# Patient Record
Sex: MALE | Race: WHITE | NOT HISPANIC OR LATINO | Employment: OTHER | RURAL
[De-identification: names, ages, dates, MRNs, and addresses within clinical notes are randomized per-mention and may not be internally consistent; named-entity substitution may affect disease eponyms.]

---

## 2018-10-31 ENCOUNTER — TELEPHONE (OUTPATIENT)
Dept: FAMILY MEDICINE CLINIC | Facility: CLINIC | Age: 63
End: 2018-10-31

## 2018-11-05 RX ORDER — HYDROCODONE BITARTRATE AND ACETAMINOPHEN 7.5; 325 MG/1; MG/1
1 TABLET ORAL EVERY 12 HOURS PRN
Qty: 60 TABLET | Refills: 0 | Status: SHIPPED | OUTPATIENT
Start: 2018-11-05 | End: 2018-11-21 | Stop reason: SDUPTHER

## 2018-11-21 ENCOUNTER — OFFICE VISIT (OUTPATIENT)
Dept: FAMILY MEDICINE CLINIC | Facility: CLINIC | Age: 63
End: 2018-11-21

## 2018-11-21 ENCOUNTER — RESULTS ENCOUNTER (OUTPATIENT)
Dept: FAMILY MEDICINE CLINIC | Facility: CLINIC | Age: 63
End: 2018-11-21

## 2018-11-21 VITALS
HEART RATE: 58 BPM | SYSTOLIC BLOOD PRESSURE: 140 MMHG | OXYGEN SATURATION: 98 % | HEIGHT: 75 IN | WEIGHT: 212.4 LBS | TEMPERATURE: 95.3 F | DIASTOLIC BLOOD PRESSURE: 70 MMHG | BODY MASS INDEX: 26.41 KG/M2

## 2018-11-21 DIAGNOSIS — F32.89 OTHER DEPRESSION: ICD-10-CM

## 2018-11-21 DIAGNOSIS — I10 ESSENTIAL HYPERTENSION: ICD-10-CM

## 2018-11-21 DIAGNOSIS — E78.5 HYPERLIPIDEMIA LDL GOAL <100: ICD-10-CM

## 2018-11-21 DIAGNOSIS — M54.50 LUMBAR BACK PAIN: ICD-10-CM

## 2018-11-21 DIAGNOSIS — F17.210 NICOTINE DEPENDENCE, CIGARETTES, UNCOMPLICATED: ICD-10-CM

## 2018-11-21 DIAGNOSIS — Z00.00 ANNUAL PHYSICAL EXAM: Primary | ICD-10-CM

## 2018-11-21 DIAGNOSIS — Z12.5 ENCOUNTER FOR SCREENING FOR MALIGNANT NEOPLASM OF PROSTATE: ICD-10-CM

## 2018-11-21 DIAGNOSIS — Z00.00 ANNUAL PHYSICAL EXAM: ICD-10-CM

## 2018-11-21 DIAGNOSIS — R06.83 SNORING: ICD-10-CM

## 2018-11-21 DIAGNOSIS — Z12.2 ENCOUNTER FOR SCREENING FOR LUNG CANCER: ICD-10-CM

## 2018-11-21 DIAGNOSIS — K21.9 GASTROESOPHAGEAL REFLUX DISEASE WITHOUT ESOPHAGITIS: ICD-10-CM

## 2018-11-21 PROCEDURE — 99396 PREV VISIT EST AGE 40-64: CPT | Performed by: FAMILY MEDICINE

## 2018-11-21 PROCEDURE — 99214 OFFICE O/P EST MOD 30 MIN: CPT | Performed by: FAMILY MEDICINE

## 2018-11-21 RX ORDER — ASPIRIN 81 MG/1
81 TABLET, CHEWABLE ORAL DAILY
Qty: 30 TABLET | Refills: 5 | Status: SHIPPED | OUTPATIENT
Start: 2018-11-21 | End: 2019-05-13 | Stop reason: SDUPTHER

## 2018-11-21 RX ORDER — RANITIDINE 150 MG/1
150 TABLET ORAL 2 TIMES DAILY
Qty: 60 TABLET | Refills: 5 | Status: SHIPPED | OUTPATIENT
Start: 2018-11-21 | End: 2019-05-13 | Stop reason: SDUPTHER

## 2018-11-21 RX ORDER — HYDROCODONE BITARTRATE AND ACETAMINOPHEN 7.5; 325 MG/1; MG/1
1 TABLET ORAL EVERY 12 HOURS PRN
Qty: 60 TABLET | Refills: 0 | Status: SHIPPED | OUTPATIENT
Start: 2018-11-21 | End: 2020-04-24 | Stop reason: SDUPTHER

## 2018-11-21 RX ORDER — SIMVASTATIN 20 MG
1 TABLET ORAL DAILY
COMMUNITY
End: 2018-11-21 | Stop reason: SDUPTHER

## 2018-11-21 RX ORDER — RANITIDINE 150 MG/1
1 TABLET ORAL 2 TIMES DAILY
COMMUNITY
Start: 2018-10-24 | End: 2018-11-21 | Stop reason: SDUPTHER

## 2018-11-21 RX ORDER — SIMVASTATIN 20 MG
20 TABLET ORAL DAILY
Qty: 30 TABLET | Refills: 5 | Status: SHIPPED | OUTPATIENT
Start: 2018-11-21 | End: 2019-05-13 | Stop reason: SDUPTHER

## 2018-11-21 RX ORDER — CITALOPRAM 20 MG/1
20 TABLET ORAL DAILY
Qty: 30 TABLET | Refills: 5 | Status: SHIPPED | OUTPATIENT
Start: 2018-11-21 | End: 2019-05-13 | Stop reason: SDUPTHER

## 2018-11-21 RX ORDER — METOPROLOL TARTRATE 50 MG/1
1 TABLET, FILM COATED ORAL 2 TIMES DAILY
COMMUNITY
End: 2018-11-21 | Stop reason: SDUPTHER

## 2018-11-21 RX ORDER — CITALOPRAM 20 MG/1
1 TABLET ORAL DAILY
COMMUNITY
End: 2018-11-21 | Stop reason: SDUPTHER

## 2018-11-21 RX ORDER — METOPROLOL TARTRATE 50 MG/1
50 TABLET, FILM COATED ORAL 2 TIMES DAILY
Qty: 60 TABLET | Refills: 5 | Status: SHIPPED | OUTPATIENT
Start: 2018-11-21 | End: 2019-05-13 | Stop reason: SDUPTHER

## 2018-11-21 NOTE — PATIENT INSTRUCTIONS
Please review the decision aid used during our discussion regarding the Low dose lung cancer screening visit today.                          Opioid Pain Medicine Information  Opioids are powerful medicines that are used to treat moderate to severe pain. Opioids should be taken with the supervision of a trained health care provider. They should be taken for the shortest period of time as possible. This is because opioids can be addictive and the longer you take opioids, the greater your risk of addiction (opioid use disorder).  What do opioids do?  Opioids help to reduce or eliminate pain. When used for short periods of time, they can help you:  · Sleep better.  · Do better in physical or occupational therapy.  · Feel better in the first few days after an injury.  · Recover from surgery.    What is a pain treatment plan?  A pain treatment plan is an agreement between you and your health care provider. Pain is unique to each person, and treatments vary depending on your condition. To manage your pain successfully, you and your health care provider need to understand each other and work together. To help you do this:  · Discuss the goals of your treatment, including how much pain you might expect to have and how you will manage the pain.  · Review the risks and benefits of taking opioid medicines for your condition.  · Remember that a good treatment plan uses more than one approach and minimizes the chance of side effects.  · Be honest about the amount of medicines you take, and about any drug or alcohol use.  · Get pain medicine prescriptions from only one care provider.  · Keep all follow-up visits as told by your health care provider. This is important.    What instructions should I follow while taking opioid pain medicine?  While you are taking the medicine and for 8 hours after you stop taking the medicine, follow these instructions:  · Do not drive.  · Do not use machinery or power tools.  · Do not sign legal  documents.  · Do not drink alcohol.  · Do not take sleeping pills.  · Do not supervise children by yourself.  · Do not participate in activities that require climbing or being in high places.  · Do not enter a body of water--such as a lake, river, ocean, spa, or swimming pool--unless an adult is nearby who can monitor and help you.    What kinds of side effects can opioids cause?  Opioids can cause side effects, such as:  · Constipation.  · Nausea.  · Vomiting.  · Drowsiness.  · Confusion.  · Opioid use disorder.  · Breathing difficulties (respiratory depression).    Using opioid pain medicines for longer than 3 days increases your risk of these side effects.  Taking opioid pain medicine for a long period of time can affect your ability to do daily tasks. It also puts you at risk for:  · Motor vehicle accidents.  · Depression.  · Suicide.  · Heart attack.  · Overdose, which can sometimes lead to death.    What are alternative ways to manage pain?  Pain can be managed with many types of alternative treatments. Ask your health care provider to refer you to one or more specialists who can help you manage pain through:  · Physical or occupational therapy.  · Counseling (cognitive behavioral therapy).  · Good nutrition.  · Biofeedback.  · Massage.  · Meditation.  · Non-opioid medicine.  · Following a gentle exercise program.    How can I keep others safe while I am taking opioid pain medicine?  · Keep pain medicine in a locked cabinet, or in a secure area where children cannot reach it.  · Never share your pain medicine with anyone.  · Do not save any leftover pills. If you have leftover medicine, you can:  1. Bring the medicine to a prescription take-back program. This is usually offered by the county or law enforcement.  2. Throw it out in the trash. To do this:  § Mix the medicine with undesirable trash such as pet waste or food.  § Put the mixture in a sealed container or plastic bag.  § Throw it in the  trash.  § Destroy any personal information on the prescription bottle.  How do I stop taking opioids if I have been taking them for a long time?  If you have been taking opioid medicine for more than a few weeks, you may need to slowly decrease (taper) how much you take until you stop completely. Tapering your use of opioids can decrease your chances of experiencing withdrawal symptoms, such as:  · Pain and cramping in the abdomen.  · Nausea.  · Sweating.  · Sleepiness.  · Restlessness.  · Uncontrollable shaking (tremors).  · Cravings for the medicine.    Do not attempt to taper your use of opioids on your own. Talk with your health care provider about how to do this. Your health care provider may prescribe a step-down schedule based on how much medicine you are taking and how long you have been taking it.  Where to find support:  If you have been taking opioids for a long time, you may benefit from receiving support for quitting from a local support group or counselor. Ask your health care provider for a referral to these resources in your area.  Where to find more information:  · Centers for Disease Control and Prevention (CDC): www.cdc.gov/drugoverdose/opioids/index.html  Get help right away if:  Seek medical care right away if you are taking opioids and you (or people close to you) notice any of the following:  · Difficulty breathing.  · Breathing that is slower or more shallow than normal.  · A very slow heartbeat (pulse).  · Severe confusion.  · Unconsciousness.  · Sleepiness.  · Slurred speech.  · Nausea and vomiting.  · Cold, clammy skin.  · Blue lips or fingernails.  · Limpness.  · Abnormally small pupils.    If you think that you or someone else may have taken too much of an opioid medicine, get medical help right away. Do not wait to see if the symptoms go away on their own.   If you ever feel like you may hurt yourself or others, or have thoughts about taking your own life, get help right away. You can  go to your nearest emergency department or call:  · Your local emergency services (911 in the [place]  · The hotline of the National Poison Control Center (1-318.622.6881 in the [place]  · A suicide crisis helpline, such as the National Suicide Prevention Lifeline at 1-745.380.8039. This is open 24 hours a day.    Summary  · Opioid medicines can help you manage moderate to severe pain for a short period of time.  · Discuss the goals of your treatment with your health care provider, including how much pain you might expect to have and how you will manage the pain.  · A good treatment plan uses more than one approach. Pain can be managed with many types of alternative treatments.  · If you think that you or someone else may have taken too much of an opioid, get medical help right away.  This information is not intended to replace advice given to you by your health care provider. Make sure you discuss any questions you have with your health care provider.  Document Released: 01/13/2017 Document Revised: 04/06/2018 Document Reviewed: 07/29/2016  Elsevier Interactive Patient Education © 2018 [employer]

## 2018-11-21 NOTE — PROGRESS NOTES
"   Low-Dose Lung Cancer CT Screening Visit    CHIEF COMPLAINT:    Shared Decision Making  I am discussing tobacco cessation today with Rupa Verma    SMOKING HISTORY:     Social History     Tobacco Use   Smoking Status Current Every Day Smoker   • Packs/day: 1.00   • Years: 50.00   • Pack years: 50.00   • Types: Cigarettes   Smokeless Tobacco Never Used       SUBJECTIVE:     Rupa Verma is currently smoking 1 pack(s) per day with a  40+ pack year history.  Reports no use of alternate forms of tobacco, electronic cigarettes, marijuana or other substances.  Based on the recommendation of the [place] Preventive Services Task Force, this patient is at high risk for lung cancer and a low-dose CT screening scan is recommended.     The patient has had no hemoptysis, unintentional weight loss or increasing shortness of breath. The patient is asymptomatic and has no signs or symptoms of lung cancer.     Together we discussed the potential benefits and potential harms of being screened for lung cancer including the potential for follow up diagnostic testing, risk for over diagnosis, false positive rate and radiation exposure using the AHRQ: Is Lung Cancer Screening Right for Me Decision Aid (Publication [id number], web site www.ahrq.gov). A copy of this decision aid resource has been provided in the after visit summary.  We also reviewed the patient's smoking history and counseled him on the importance and health benefits of stopping the use of tobacco products.      OBJECTIVE:    /70 (BP Location: Left arm, Patient Position: Sitting, Cuff Size: Adult)   Pulse 58   Temp 95.3 °F (35.2 °C)   Ht 190.5 cm (75\")   Wt 96.3 kg (212 lb 6.4 oz)   SpO2 98%   BMI 26.55 kg/m²   General: no distress, alert and oriented  Chest: Lung sounds are clear to auscultation, no wheezes, rales or rhonchi, with symmetric air entry. No respiratory distress  Cardiovascular: RRR with no murmur auscultated      Smoking Cessation " discussion:     We discussed that there are a number of resources and interventions to assist with smoking cessation if needed in the future including the 1-800-Quit Now line.(Included in the decision aid shared with the patient today).   On a scale of zero to ten, the patient rates their motivation to quit at a 2 out of 10 today.  Referral to stop smoking class has been offered. Medication options for tobacco cessation have been discussed with the patient.     Recommendations for continued lung cancer screening:      We discussed the NCCN guidelines for lung cancer screening and the patient verbalized understanding that annual screening is recommended until fifteen years beyond smoking as long as they have no other disease or comorbidity that would prevent them from receiving cancer treatments such as surgery should a lung cancer be detected.  After review of the NCCN guidelines and recommendations for ongoing screening, the patient verbalized understanding of recommendations for follow-up.  The patient has decided to proceed with a Low Dose Lung Cancer Screening CT today.      10 minutes face-to-face spent counseling patient on the continued health benefits of having quit tobacco, maintaining smoking abstinence, smoking cessation strategies and resources, as well as the importance of adherence to annual lung cancer low-dose CT screening.

## 2018-11-22 NOTE — PROGRESS NOTES
OFFICE VISIT NOTE:    Rupa Verma is a 63 y.o. male who presents today for Annual Exam (pt is here for physical).     Back Pain   This is a chronic problem. The current episode started more than 1 year ago. The problem occurs daily. The problem has been waxing and waning since onset. The pain is present in the lumbar spine. The quality of the pain is described as aching and cramping. The pain does not radiate. The pain is at a severity of 8/10. The pain is the same all the time. The symptoms are aggravated by bending, twisting, standing and position. Stiffness is present all day. Associated symptoms include weakness. Pertinent negatives include no bladder incontinence, bowel incontinence, chest pain, fever or tingling. Risk factors include sedentary lifestyle. He has tried analgesics for the symptoms. The treatment provided mild relief.      Also, wanted an annual exam but declined the /rectal exam. He reports excess snoring according to his wife. And has a chronic problem with his lower back for years. Does take chronic Norco, but minimal dose, and he will be referred to pain management given its chronicity.    Past medical/surgical history, Family history, Social history, Allergies and Medications have been reviewed with the patient today and are updated in [facility] EMR. See below.    Past Medical History:   Diagnosis Date   • Hyperlipidemia    • Hypertension      Past Surgical History:   Procedure Laterality Date   • CHOLECYSTECTOMY     • COLONOSCOPY     • ENDOSCOPY     • PATELLA FRACTURE SURGERY     • TONSILLECTOMY       History reviewed. No pertinent family history.  Social History     Tobacco Use   • Smoking status: Current Every Day Smoker     Packs/day: 1.00     Years: 50.00     Pack years: 50.00     Types: Cigarettes   • Smokeless tobacco: Never Used   Substance Use Topics   • Alcohol use: Yes     Comment: occ   • Drug use: No       Allergies:  Patient has no known allergies.      Review of  "Systems:    Review of Systems   Constitutional: Negative for activity change, appetite change, fatigue and fever.   Respiratory: Negative for cough and shortness of breath.    Cardiovascular: Negative for chest pain.   Gastrointestinal: Negative for bowel incontinence.   Genitourinary: Negative for urinary incontinence.   Musculoskeletal: Positive for back pain and myalgias.   Skin: Negative for rash.   Neurological: Positive for weakness. Negative for tingling, syncope and headache.         Physical Examination:  Vital Signs:  /70 (BP Location: Left arm, Patient Position: Sitting, Cuff Size: Adult)   Pulse 58   Temp 95.3 °F (35.2 °C)   Ht 190.5 cm (75\")   Wt 96.3 kg (212 lb 6.4 oz)   SpO2 98%   BMI 26.55 kg/m²   Physical Exam   Constitutional: He is oriented to person, place, and time. He appears well-developed and well-nourished. No distress.   HENT:   Head: Normocephalic and atraumatic.   Nose: Nose normal.   Mouth/Throat: Oropharynx is clear and moist.   Eyes: Conjunctivae are normal.   Neck: Normal range of motion. Neck supple. No JVD present.   Cardiovascular: Normal rate, regular rhythm and normal heart sounds.   Pulmonary/Chest: Effort normal. No respiratory distress. He has wheezes.   Abdominal: Soft. He exhibits no distension. There is no tenderness.   Musculoskeletal: Normal range of motion. He exhibits no edema.   Neurological: He is alert and oriented to person, place, and time.   Skin: Skin is warm and dry. Capillary refill takes less than 2 seconds. No pallor.   Psychiatric: He has a normal mood and affect. His behavior is normal.   Nursing note and vitals reviewed.      Procedures      ASSESSMENT/ PLAN:    Rupa was seen today for annual exam.    Diagnoses and all orders for this visit:    Annual physical exam  -     CBC (No Diff); Future  -     PSA Screen; Future    Encounter for screening for lung cancer  -     CT chest low dose wo; Future    Nicotine dependence, cigarettes, " uncomplicated  -     CT chest low dose wo; Future    Snoring  -     Ambulatory Referral to Sleep Medicine    Essential hypertension  -     CBC (No Diff); Future  -     Comprehensive Metabolic Panel; Future  -     T4; Future  -     TSH; Future  -     metoprolol tartrate (LOPRESSOR) 50 MG tablet; Take 1 tablet by mouth 2 (Two) Times a Day.  -     aspirin (ASPIRIN 81) 81 MG chewable tablet; Chew 1 tablet Daily.    Hyperlipidemia LDL goal <100  -     Lipid Panel; Future  -     simvastatin (ZOCOR) 20 MG tablet; Take 1 tablet by mouth Daily.    Encounter for screening for malignant neoplasm of prostate  -     PSA Screen; Future    Gastroesophageal reflux disease without esophagitis  -     raNITIdine (ZANTAC) 150 MG tablet; Take 1 tablet by mouth 2 (Two) Times a Day.    Lumbar back pain  -     HYDROcodone-acetaminophen (NORCO) 7.5-325 MG per tablet; Take 1 tablet by mouth Every 12 (Twelve) Hours As Needed for Moderate Pain  or Severe Pain .  -     Ambulatory Referral to Pain Management  -     Urine Drug Screen - Urine, Clean Catch; Future    Other depression  -     citalopram (CeleXA) 20 MG tablet; Take 1 tablet by mouth Daily.          Current Outpatient Medications:   •  citalopram (CeleXA) 20 MG tablet, Take 1 tablet by mouth Daily., Disp: 30 tablet, Rfl: 5  •  HYDROcodone-acetaminophen (NORCO) 7.5-325 MG per tablet, Take 1 tablet by mouth Every 12 (Twelve) Hours As Needed for Moderate Pain  or Severe Pain ., Disp: 60 tablet, Rfl: 0  •  metoprolol tartrate (LOPRESSOR) 50 MG tablet, Take 1 tablet by mouth 2 (Two) Times a Day., Disp: 60 tablet, Rfl: 5  •  raNITIdine (ZANTAC) 150 MG tablet, Take 1 tablet by mouth 2 (Two) Times a Day., Disp: 60 tablet, Rfl: 5  •  simvastatin (ZOCOR) 20 MG tablet, Take 1 tablet by mouth Daily., Disp: 30 tablet, Rfl: 5  •  aspirin (ASPIRIN 81) 81 MG chewable tablet, Chew 1 tablet Daily., Disp: 30 tablet, Rfl: 5    FOLLOW-UP:    Return in 3 months (on 2/21/2019) for Recheck.    I discussed the  patients findings and my recommendations with patient.  An After Visit Summary (AVS) was printed and given to the patient at discharge.    Jose Leyva MD, FAAFP  11/21/2018

## 2018-12-04 ENCOUNTER — HOSPITAL ENCOUNTER (OUTPATIENT)
Dept: CT IMAGING | Facility: HOSPITAL | Age: 63
Discharge: HOME | End: 2018-12-04
Attending: FAMILY MEDICINE | Admitting: FAMILY MEDICINE

## 2018-12-04 DIAGNOSIS — F17.210 NICOTINE DEPENDENCE, CIGARETTES, UNCOMPLICATED: ICD-10-CM

## 2018-12-04 DIAGNOSIS — Z12.2 ENCOUNTER FOR SCREENING FOR LUNG CANCER: ICD-10-CM

## 2018-12-04 PROCEDURE — G0297 LDCT FOR LUNG CA SCREEN: HCPCS

## 2018-12-05 ENCOUNTER — TELEPHONE (OUTPATIENT)
Dept: FAMILY MEDICINE CLINIC | Facility: CLINIC | Age: 63
End: 2018-12-05

## 2018-12-14 ENCOUNTER — TELEPHONE (OUTPATIENT)
Dept: FAMILY MEDICINE CLINIC | Facility: CLINIC | Age: 63
End: 2018-12-14

## 2018-12-14 NOTE — TELEPHONE ENCOUNTER
Joyce stated we need the form order written out for Pt's sleep lab please.  Please fax to her at [phone].    Thanks.

## 2019-04-19 ENCOUNTER — DOCUMENTATION (OUTPATIENT)
Dept: CT IMAGING | Facility: HOSPITAL | Age: 64
End: 2019-04-19

## 2019-04-22 DIAGNOSIS — R91.8 MULTIPLE LUNG NODULES ON CT: Primary | ICD-10-CM

## 2019-05-03 DIAGNOSIS — R91.8 MULTIPLE LUNG NODULES ON CT: Primary | ICD-10-CM

## 2019-05-13 ENCOUNTER — OFFICE VISIT (OUTPATIENT)
Dept: FAMILY MEDICINE CLINIC | Facility: CLINIC | Age: 64
End: 2019-05-13

## 2019-05-13 VITALS
TEMPERATURE: 97 F | HEIGHT: 75 IN | WEIGHT: 214.8 LBS | OXYGEN SATURATION: 95 % | BODY MASS INDEX: 26.71 KG/M2 | HEART RATE: 55 BPM | DIASTOLIC BLOOD PRESSURE: 68 MMHG | SYSTOLIC BLOOD PRESSURE: 158 MMHG

## 2019-05-13 DIAGNOSIS — F32.89 OTHER DEPRESSION: ICD-10-CM

## 2019-05-13 DIAGNOSIS — E78.5 HYPERLIPIDEMIA LDL GOAL <100: ICD-10-CM

## 2019-05-13 DIAGNOSIS — I10 ESSENTIAL HYPERTENSION: ICD-10-CM

## 2019-05-13 DIAGNOSIS — K21.9 GASTROESOPHAGEAL REFLUX DISEASE WITHOUT ESOPHAGITIS: ICD-10-CM

## 2019-05-13 DIAGNOSIS — R91.8 MULTIPLE LUNG NODULES ON CT: Primary | ICD-10-CM

## 2019-05-13 DIAGNOSIS — R91.1 LUNG NODULE: ICD-10-CM

## 2019-05-13 DIAGNOSIS — I10 ESSENTIAL HYPERTENSION: Primary | ICD-10-CM

## 2019-05-13 PROCEDURE — 99213 OFFICE O/P EST LOW 20 MIN: CPT | Performed by: FAMILY MEDICINE

## 2019-05-13 NOTE — PATIENT INSTRUCTIONS
Suspect Essential HTN.Good BP control is encouraged with Goal BP based on JNC 8 guidelines 2014 <140/90 for patients with known cardiac disease and diabetes. (MARINA. 2014:322 (5):507-520. doi:10.1001/marina.2013.21168): general population <60 yr old goal BP <140/90 and for those >60 <150/90.  For patients of all ages with Diabetes, CKD, Known CAD <140/90. Recommended to the patient to obtain electronic home BP machine with upper arm blood pressure cuff and to check regularly as instructed.  Keep BP log and bring to subsequent visits. Stable, at goal.  a. LABS: routine for hypertension recommended and ordered if necessary.  b. Recommend if you do not have a home BP machine to obtain an electronic machine with arm blood pressure cuff.      c. Monitor BP over the next week and keep log to bring back to office. Discussed medication therapy however pt wants to try to control with diet exercise. .  Your provider  has recommended self-monitoring of your blood pressure.  If you do not have a blood pressure cuff you may purchase one from the local pharmacy.  You may ask the pharmacist which brand and model they recommend.  Obtain your blood pressure measurement at least 2x per week.  You should also check your blood pressure if you experience any symptoms of blurred visit, dizziness or headache.  Please record all blood pressure measurements and bring them to next office visit.  If you have any questions about the accuracy of your blood pressure machine please bring it in to the office and our staff will be happy to check accuracy.   d. Encouraged to eat a low sodium heart healthy diet  e. Offered handout on HTN educational topics.  These were provided if patient requested these today.  f. MEDS: as listed in today's visit.  g. Risks/benefits of current and new medications discussed with the patient and or family today.  The patient/family are aware and accept that if there any side effects they should call or return to clinic  as soon as possible.  Appropriate F/U discussed for topics addressed today. All questions were answered to the  satisfactory state of patient/family.  Should symptoms fail to improve or worsen they agree to call or return to clinic or to go to the ER. Education handouts were offered on any new Rx if requested.  Discussed the importance of following up with any needed screening tests/labs/specialist appointments and any requested follow-up recommended by me today.  Importance of maintaining follow-up discussed and patient accepts that missed appointments can delay diagnosis and potentially lead to worsening of conditions.      Chronic Obstructive Pulmonary Disease  Chronic obstructive pulmonary disease (COPD) is a long-term (chronic) condition that affects the lungs. COPD is a general term that can be used to describe many different lung problems that cause lung swelling (inflammation) and limit airflow, including chronic bronchitis and emphysema. If you have COPD, your lung function will probably never return to normal. In most cases, it gets worse over time. However, there are steps you can take to slow the progression of the disease and improve your quality of life.  What are the causes?  This condition may be caused by:  · Smoking. This is the most common cause.  · Certain genes passed down through families.    What increases the risk?  The following factors may make you more likely to develop this condition:  · Secondhand smoke from cigarettes, pipes, or cigars.  · Exposure to chemicals and other irritants such as fumes and dust in the work environment.  · Chronic lung conditions or infections.    What are the signs or symptoms?  Symptoms of this condition include:  · Shortness of breath, especially during physical activity.  · Chronic cough with a large amount of thick mucus. Sometimes the cough may not have any mucus (dry cough).  · Wheezing.  · Rapid breaths.  · Gray or bluish discoloration (cyanosis) of the  skin, especially in your fingers, toes, or lips.  · Feeling tired (fatigue).  · Weight loss.  · Chest tightness.  · Frequent infections.  · Episodes when breathing symptoms become much worse (exacerbations).  · Swelling in the ankles, feet, or legs. This may occur in later stages of the disease.    How is this diagnosed?  This condition is diagnosed based on:  · Your medical history.  · A physical exam.    You may also have tests, including:  · Lung (pulmonary) function tests. This may include a spirometry test, which measures your ability to exhale properly.  · Chest X-ray.  · CT scan.  · Blood tests.    How is this treated?  This condition may be treated with:  · Medicines. These may include inhaled rescue medicines to treat acute exacerbations as well as long-term, or maintenance, medicines to prevent flare-ups of COPD.  ? Bronchodilators help treat COPD by dilating the airways to allow increased airflow and make your breathing more comfortable.  ? Steroids can reduce airway inflammation and help prevent exacerbations.  · Smoking cessation. If you smoke, your health care provider may ask you to quit, and may also recommend therapy or replacement products to help you quit.  · Pulmonary rehabilitation. This may involve working with a team of health care providers and specialists, such as respiratory, occupational, and physical therapists.  · Exercise and physical activity. These are beneficial for nearly all people with COPD.  · Nutrition therapy to gain weight, if you are underweight.  · Oxygen. Supplemental oxygen therapy is only helpful if you have a low oxygen level in your blood (hypoxemia).  · Lung surgery or transplant.  · Palliative care. This is to help people with COPD feel comfortable when treatment is no longer working.    Follow these instructions at home:  Medicines  · Take over-the-counter and prescription medicines (inhaled or pills) only as told by your health care provider.  · Talk to your health  care provider before taking any cough or allergy medicines. You may need to avoid certain medicines that dry out your airways.  Lifestyle  · If you are a smoker, the most important thing that you can do is to stop smoking. Do not use any products that contain nicotine or tobacco, such as cigarettes and e-cigarettes. If you need help quitting, ask your health care provider. Continuing to smoke will cause the disease to progress faster.  · Avoid exposure to things that irritate your lungs, such as smoke, chemicals, and fumes.  · Stay active, but balance activity with periods of rest. Exercise and physical activity will help you maintain your ability to do things you want to do.  · Learn and use relaxation techniques to manage stress and to control your breathing.  · Get the right amount of sleep and get quality sleep. Most adults need 7 or more hours per night.  · Eat healthy foods. Eating smaller, more frequent meals and resting before meals may help you maintain your strength.  Controlled breathing  Learn and use controlled breathing techniques as directed by your health care provider. Controlled breathing techniques include:  · Pursed lip breathing. Start by breathing in (inhaling) through your nose for 1 second. Then, purse your lips as if you were going to whistle and breathe out (exhale) through the pursed lips for 2 seconds.  · Diaphragmatic breathing. Start by putting one hand on your abdomen just above your waist. Inhale slowly through your nose. The hand on your abdomen should move out. Then purse your lips and exhale slowly. You should be able to feel the hand on your abdomen moving in as you exhale.    Controlled coughing  Learn and use controlled coughing to clear mucus from your lungs. Controlled coughing is a series of short, progressive coughs. The steps of controlled coughing are:  1. Lean your head slightly forward.  2. Breathe in deeply using diaphragmatic breathing.  3. Try to hold your breath for  3 seconds.  4. Keep your mouth slightly open while coughing twice.  5. Spit any mucus out into a tissue.  6. Rest and repeat the steps once or twice as needed.    General instructions  · Make sure you receive all the vaccines that your health care provider recommends, especially the pneumococcal and influenza vaccines. Preventing infection and hospitalization is very important when you have COPD.  · Use oxygen therapy and pulmonary rehabilitation if directed to by your health care provider. If you require home oxygen therapy, ask your health care provider whether you should purchase a pulse oximeter to measure your oxygen level at home.  · Work with your health care provider to develop a COPD action plan. This will help you know what steps to take if your condition gets worse.  · Keep other chronic health conditions under control as told by your health care provider.  · Avoid extreme temperature and humidity changes.  · Avoid contact with people who have an illness that spreads from person to person (is contagious), such as viral infections or pneumonia.  · Keep all follow-up visits as told by your health care provider. This is important.  Contact a health care provider if:  · You are coughing up more mucus than usual.  · There is a change in the color or thickness of your mucus.  · Your breathing is more labored than usual.  · Your breathing is faster than usual.  · You have difficulty sleeping.  · You need to use your rescue medicines or inhalers more often than expected.  · You have trouble doing routine activities such as getting dressed or walking around the house.  Get help right away if:  · You have shortness of breath while you are resting.  · You have shortness of breath that prevents you from:  ? Being able to talk.  ? Performing your usual physical activities.  · You have chest pain lasting longer than 5 minutes.  · Your skin color is more blue (cyanotic) than usual.  · You measure low oxygen saturations  for longer than 5 minutes with a pulse oximeter.  · You have a fever.  · You feel too tired to breathe normally.  Summary  · Chronic obstructive pulmonary disease (COPD) is a long-term (chronic) condition that affects the lungs.  · Your lung function will probably never return to normal. In most cases, it gets worse over time. However, there are steps you can take to slow the progression of the disease and improve your quality of life.  · Treatment for COPD may include taking medicines, quitting smoking, pulmonary rehabilitation, and changes to diet and exercise. As the disease progresses, you may need oxygen therapy, a lung transplant, or palliative care.  · To help manage your condition, do not smoke, avoid exposure to things that irritate your lungs, stay up to date on all vaccines, and follow your health care provider's instructions for taking medicines.  This information is not intended to replace advice given to you by your health care provider. Make sure you discuss any questions you have with your health care provider.  Document Released: 09/27/2006 Document Revised: 06/13/2018 Document Reviewed: 01/22/2018  [employer] Interactive Patient Education © 2019 [employer]

## 2019-05-13 NOTE — PROGRESS NOTES
OFFICE VISIT NOTE:    Rupa Verma is a 64 y.o. male who presents today for Hypertension (f/u) and discuss CT results.     He had a CT of chest at end of November or December last year, with spiculated nodule, and we've been trying to arrange a follow-up PET scan as per pulmonary Navigator suggestions, but insurance has denied this test a few times for uncertain reason. We have recommended repeat CT chest to see if any interim change, and will recheck this as well as considering pulmonology referral for opinion also. Denies any blood in cough. No chest pain. Still smoking.          Past medical/surgical history, Family history, Social history, Allergies and Medications have been reviewed with the patient today and are updated in [employer] EMR. See below.    Past Medical History:   Diagnosis Date   • Back pain    • Hyperlipidemia    • Hypertension      Past Surgical History:   Procedure Laterality Date   • CHOLECYSTECTOMY     • COLONOSCOPY     • ENDOSCOPY     • PATELLA FRACTURE SURGERY     • TONSILLECTOMY       Family History   Problem Relation Age of Onset   • Hypertension Mother    • No Known Problems Father    • Diabetes Neg Hx    • Cancer Neg Hx    • Stroke Neg Hx      Social History     Tobacco Use   • Smoking status: Current Every Day Smoker     Packs/day: 1.00     Years: 50.00     Pack years: 50.00     Types: Cigarettes   • Smokeless tobacco: Never Used   Substance Use Topics   • Alcohol use: Yes     Comment: occ   • Drug use: No       Allergies:  Patient has no known allergies.    Current Meds:    Current Outpatient Medications:   •  HYDROcodone-acetaminophen (NORCO) 7.5-325 MG per tablet, Take 1 tablet by mouth Every 12 (Twelve) Hours As Needed for Moderate Pain  or Severe Pain ., Disp: 60 tablet, Rfl: 0  •  citalopram (CeleXA) 20 MG tablet, TAKE ONE TABLET BY MOUTH EVERY DAY, Disp: 90 tablet, Rfl: 1  •  HM ASPIRIN 81 MG chewable tablet, CHEW AND SWALLOW ONE TABLET BY MOUTH EVERY DAY, Disp: 90 tablet, Rfl: 1  •  " metoprolol tartrate (LOPRESSOR) 50 MG tablet, TAKE ONE TABLET BY MOUTH TWICE A DAY, Disp: 180 tablet, Rfl: 1  •  raNITIdine (ZANTAC) 150 MG tablet, TAKE ONE TABLET BY MOUTH TWICE A DAY, Disp: 120 tablet, Rfl: 1  •  simvastatin (ZOCOR) 20 MG tablet, TAKE ONE TABLET BY MOUTH EVERY DAY, Disp: 90 tablet, Rfl: 1    Review of Systems:  Review of Systems   Constitutional: Negative for activity change, appetite change, fatigue, fever, unexpected weight gain and unexpected weight loss.   Respiratory: Negative for shortness of breath.    Cardiovascular: Negative for chest pain.   Gastrointestinal: Negative for abdominal pain.   Genitourinary: Negative for difficulty urinating.   Skin: Negative for rash.   Neurological: Negative for syncope and headache.       Physical Examination:  Vital Signs:  /68 (BP Location: Left arm, Patient Position: Sitting, Cuff Size: Adult)   Pulse 55   Temp 97 °F (36.1 °C) (Tympanic)   Ht 190.5 cm (75\")   Wt 97.4 kg (214 lb 12.8 oz)   SpO2 95%   BMI 26.85 kg/m²   Physical Exam   Constitutional: He is oriented to person, place, and time. He appears well-developed and well-nourished. No distress.   HENT:   Head: Normocephalic and atraumatic.   Mouth/Throat: Oropharynx is clear and moist.   Neck: Normal range of motion. Neck supple. No JVD present.   Cardiovascular: Normal rate, regular rhythm, normal heart sounds and intact distal pulses.   Pulmonary/Chest: Effort normal. No respiratory distress. He has wheezes. He has rales (chronic).   Abdominal: Soft. He exhibits no distension. There is no tenderness.   Musculoskeletal: Normal range of motion. He exhibits no edema.   Neurological: He is alert and oriented to person, place, and time. No cranial nerve deficit.   Skin: Skin is warm and dry. Capillary refill takes less than 2 seconds. No rash noted.   Psychiatric: He has a normal mood and affect. His behavior is normal.   Nursing note and vitals reviewed.      Procedures    ASSESSMENT/ " PLAN:        Problem List Items Addressed This Visit        Cardiovascular and Mediastinum    Essential hypertension - Primary      Other Visit Diagnoses     Lung nodule                       Specific Patient Instructions:  MEDICATION Instructions: Encouraged patient to continue routine medicines as prescribed and maintain compliance. Patient instructed to report any adverse side effects or reactions to medicines promptly to the office. Patient instructed to make us aware of any OTC or herbal meds or supplement use.  DIET Recommendations: No new recommendations regarding diet/restrictions.  EXERCISE Instructions: No new recommendations.    SMOKING Recommendations: Counseled patient and encouraged them on smoking cessation.  HEALTH MAINTENANCE:  N/A  MISCELLANEOUS Instructions: N/A      Medications ordered or changed this visit:  No orders of the defined types were placed in this encounter.       FOLLOW-UP:  Return in about 6 months (around 11/13/2019) for Recheck.    I discussed the patients findings and my recommendations with patient.  An After Visit Summary (AVS) was printed and given to the patient at discharge.      Jose Leyva MD, FAAFP  5/15/2019

## 2019-05-15 RX ORDER — CITALOPRAM 20 MG/1
TABLET ORAL
Qty: 90 TABLET | Refills: 1 | Status: SHIPPED | OUTPATIENT
Start: 2019-05-15 | End: 2019-11-12 | Stop reason: SDUPTHER

## 2019-05-15 RX ORDER — ASPIRIN 81 MG/1
TABLET, CHEWABLE ORAL
Qty: 90 TABLET | Refills: 1 | Status: SHIPPED | OUTPATIENT
Start: 2019-05-15 | End: 2020-12-16 | Stop reason: SDUPTHER

## 2019-05-15 RX ORDER — METOPROLOL TARTRATE 50 MG/1
TABLET, FILM COATED ORAL
Qty: 180 TABLET | Refills: 1 | Status: SHIPPED | OUTPATIENT
Start: 2019-05-15 | End: 2019-11-12 | Stop reason: SDUPTHER

## 2019-05-15 RX ORDER — RANITIDINE 150 MG/1
TABLET ORAL
Qty: 120 TABLET | Refills: 1 | Status: SHIPPED | OUTPATIENT
Start: 2019-05-15 | End: 2019-09-10 | Stop reason: SDUPTHER

## 2019-05-15 RX ORDER — SIMVASTATIN 20 MG
TABLET ORAL
Qty: 90 TABLET | Refills: 1 | Status: SHIPPED | OUTPATIENT
Start: 2019-05-15 | End: 2019-11-12 | Stop reason: SDUPTHER

## 2019-06-07 ENCOUNTER — TELEPHONE (OUTPATIENT)
Dept: FAMILY MEDICINE CLINIC | Facility: CLINIC | Age: 64
End: 2019-06-07

## 2019-06-07 DIAGNOSIS — I10 ESSENTIAL HYPERTENSION: Primary | ICD-10-CM

## 2019-06-07 RX ORDER — AMLODIPINE BESYLATE 5 MG/1
5 TABLET ORAL DAILY
Qty: 30 TABLET | Refills: 2 | Status: SHIPPED | OUTPATIENT
Start: 2019-06-07 | End: 2019-08-13 | Stop reason: SDUPTHER

## 2019-06-07 NOTE — TELEPHONE ENCOUNTER
Pt called regarding blood pressure medication. He stated that at last appt, Dr. Leyva had advised that he was going to raise dosage. Per Pt at this time, dosage has not been raised. Pt stated that as of today his systolic reading was 176 and he is concerned. Please call regarding this.

## 2019-08-01 ENCOUNTER — TELEPHONE (OUTPATIENT)
Dept: FAMILY MEDICINE CLINIC | Facility: CLINIC | Age: 64
End: 2019-08-01

## 2019-08-01 NOTE — TELEPHONE ENCOUNTER
----- Message from Shantelle Ponce sent at 8/1/2019  9:04 AM CDT -----  Regarding: FW: Follow-up  Please call and schedule this patient a follow up appointment with Dr. Leyva.     ----- Message -----  From: Jose Leyva MD  Sent: 7/31/2019   9:54 AM  To: Shantelle JERSON Kris  Subject: RE: Follow-up                                    Yes please - he needs to understand the need for follow up appts.  ----- Message -----  From: Abner Ponceanat ARTHUR  Sent: 7/25/2019   8:58 AM  To: Jose Leyva MD  Subject: FW: Follow-up                                    This patient no showed for his pulmonary consult. Would you like this patient to follow up with you or would you like us to set him up a new appt in pulmonology?    Thank you!    ----- Message -----  From: Martha Cedillo  Sent: 7/24/2019   1:20 PM  To: Shantelle Ponce  Subject: RE: Follow-up                                    You may already know this, just thought I would mention. I was doing follow-ups and noticed this patient was a no show for his pulmonary consult.  Thanks,   ----- Message -----  From: KrisWilliamShantelle JERSON  Sent: 5/2/2019  10:27 AM  To: Martha Cedillo  Subject: RE: Follow-up                                    The ICD-10 used was R91.8    ----- Message -----  From: Martha Cedillo  Sent: 5/2/2019   9:50 AM  To: Shantelle Ponce  Subject: RE: Follow-up                                    That is insane. What ICD-10 code where you using?  I wonder if they would approve a CT chest with contrast.  I really feel this patient needs a follow-up.      ----- Message -----  From: Shantelle Ponce  Sent: 5/2/2019   8:14 AM  To: Martha Cedillo  Subject: RE: Follow-up                                    Hi Martha,  We tried to get authorization for this patient to have a Pet Scan through their insurance and it has been denied.     ----- Message -----  From: Martha Cedillo  Sent: 4/22/2019  10:25 AM  To: Shantelle Ponce  Subject: RE: Follow-up                                 "    The Radiologist had recommended a \"PET CT\" (which is a Pet Scan) or tissue sampling.   If he is wanting to go with Imaging, then a Pet scan is what he would need. Thanks,    ----- Message -----  From: Shantelle Ponce  Sent: 4/22/2019   9:48 AM  To: Martha Cedillo  Subject: RE: Follow-up                                    Martha,  I have talked with Dr. Leyva regarding this patient and he wanted your opinion on which you think would be best for the patient, a CT or a PET scan?    Thank you!  ----- Message -----  From: Jose Leyva MD  Sent: 4/19/2019   5:33 PM  To: Shantelle Mcclellan  Subject: RE: Follow-up                                    Yes, OK to pend the order for me, please!  ----- Message -----  From: Martha Cedillo  Sent: 4/19/2019  12:56 PM  To: Jose Leyva MD  Subject: Follow-up                                        Hi, I'm the Lung Navigator for [facility].  Just a friendly reminder that this patient was due a follow-up CT or PET Scan.  I did not see any of this in his chart                      "

## 2019-08-13 DIAGNOSIS — I10 ESSENTIAL HYPERTENSION: ICD-10-CM

## 2019-08-14 RX ORDER — AMLODIPINE BESYLATE 5 MG/1
TABLET ORAL
Qty: 90 TABLET | Refills: 0 | Status: SHIPPED | OUTPATIENT
Start: 2019-08-14 | End: 2019-08-16 | Stop reason: SDUPTHER

## 2019-08-16 ENCOUNTER — OFFICE VISIT (OUTPATIENT)
Dept: FAMILY MEDICINE CLINIC | Facility: CLINIC | Age: 64
End: 2019-08-16

## 2019-08-16 VITALS
RESPIRATION RATE: 22 BRPM | SYSTOLIC BLOOD PRESSURE: 143 MMHG | DIASTOLIC BLOOD PRESSURE: 76 MMHG | BODY MASS INDEX: 26.71 KG/M2 | WEIGHT: 214.8 LBS | OXYGEN SATURATION: 92 % | HEIGHT: 75 IN | HEART RATE: 57 BPM

## 2019-08-16 DIAGNOSIS — R91.1 LUNG NODULE: Primary | ICD-10-CM
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
## 2019-08-16 DIAGNOSIS — E78.5 HYPERLIPIDEMIA LDL GOAL <100: ICD-10-CM

## 2019-08-16 DIAGNOSIS — I10 ESSENTIAL HYPERTENSION: ICD-10-CM

## 2019-08-16 DIAGNOSIS — R93.89 ABNORMAL CHEST CT: ICD-10-CM

## 2019-08-16 DIAGNOSIS — J43.1 PANLOBULAR EMPHYSEMA (HCC): ICD-10-CM

## 2019-08-16 PROCEDURE — 99214 OFFICE O/P EST MOD 30 MIN: CPT | Performed by: FAMILY MEDICINE

## 2019-08-16 RX ORDER — AMLODIPINE BESYLATE 5 MG/1
5 TABLET ORAL DAILY
Qty: 90 TABLET | Refills: 1 | Status: SHIPPED | OUTPATIENT
Start: 2019-08-16 | End: 2020-03-10
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
## 2019-08-16 NOTE — PATIENT INSTRUCTIONS
Chronic Obstructive Pulmonary Disease    Chronic obstructive pulmonary disease (COPD) is a long-term (chronic) condition that affects the lungs. COPD is a general term that can be used to describe many different lung problems that cause lung swelling (inflammation) and limit airflow, including chronic bronchitis and emphysema. If you have COPD, your lung function will probably never return to normal. In most cases, it gets worse over time. However, there are steps you can take to slow the progression of the disease and improve your quality of life.  What are the causes?  This condition may be caused by:  · Smoking. This is the most common cause.  · Certain genes passed down through families.  What increases the risk?  The following factors may make you more likely to develop this condition:  · Secondhand smoke from cigarettes, pipes, or cigars.  · Exposure to chemicals and other irritants such as fumes and dust in the work environment.  · Chronic lung conditions or infections.  What are the signs or symptoms?  Symptoms of this condition include:  · Shortness of breath, especially during physical activity.  · Chronic cough with a large amount of thick mucus. Sometimes the cough may not have any mucus (dry cough).  · Wheezing.  · Rapid breaths.  · Gray or bluish discoloration (cyanosis) of the skin, especially in your fingers, toes, or lips.  · Feeling tired (fatigue).  · Weight loss.  · Chest tightness.  · Frequent infections.  · Episodes when breathing symptoms become much worse (exacerbations).  · Swelling in the ankles, feet, or legs. This may occur in later stages of the disease.  How is this diagnosed?  This condition is diagnosed based on:  · Your medical history.  · A physical exam.  You may also have tests, including:  · Lung (pulmonary) function tests. This may include a spirometry test, which measures your ability to exhale properly.  · Chest X-ray.  · CT scan.  · Blood tests.  How is this treated?  This  condition may be treated with:  · Medicines. These may include inhaled rescue medicines to treat acute exacerbations as well as long-term, or maintenance, medicines to prevent flare-ups of COPD.  ? Bronchodilators help treat COPD by dilating the airways to allow increased airflow and make your breathing more comfortable.  ? Steroids can reduce airway inflammation and help prevent exacerbations.  · Smoking cessation. If you smoke, your health care provider may ask you to quit, and may also recommend therapy or replacement products to help you quit.  · Pulmonary rehabilitation. This may involve working with a team of health care providers and specialists, such as respiratory, occupational, and physical therapists.  · Exercise and physical activity. These are beneficial for nearly all people with COPD.  · Nutrition therapy to gain weight, if you are underweight.  · Oxygen. Supplemental oxygen therapy is only helpful if you have a low oxygen level in your blood (hypoxemia).  · Lung surgery or transplant.  · Palliative care. This is to help people with COPD feel comfortable when treatment is no longer working.  Follow these instructions at home:  Medicines  · Take over-the-counter and prescription medicines (inhaled or pills) only as told by your health care provider.  · Talk to your health care provider before taking any cough or allergy medicines. You may need to avoid certain medicines that dry out your airways.  Lifestyle  · If you are a smoker, the most important thing that you can do is to stop smoking. Do not use any products that contain nicotine or tobacco, such as cigarettes and e-cigarettes. If you need help quitting, ask your health care provider. Continuing to smoke will cause the disease to progress faster.  · Avoid exposure to things that irritate your lungs, such as smoke, chemicals, and fumes.  · Stay active, but balance activity with periods of rest. Exercise and physical activity will help you maintain  your ability to do things you want to do.  · Learn and use relaxation techniques to manage stress and to control your breathing.  · Get the right amount of sleep and get quality sleep. Most adults need 7 or more hours per night.  · Eat healthy foods. Eating smaller, more frequent meals and resting before meals may help you maintain your strength.  Controlled breathing  Learn and use controlled breathing techniques as directed by your health care provider. Controlled breathing techniques include:  · Pursed lip breathing. Start by breathing in (inhaling) through your nose for 1 second. Then, purse your lips as if you were going to whistle and breathe out (exhale) through the pursed lips for 2 seconds.  · Diaphragmatic breathing. Start by putting one hand on your abdomen just above your waist. Inhale slowly through your nose. The hand on your abdomen should move out. Then purse your lips and exhale slowly. You should be able to feel the hand on your abdomen moving in as you exhale.  Controlled coughing  Learn and use controlled coughing to clear mucus from your lungs. Controlled coughing is a series of short, progressive coughs. The steps of controlled coughing are:  1. Lean your head slightly forward.  2. Breathe in deeply using diaphragmatic breathing.  3. Try to hold your breath for 3 seconds.  4. Keep your mouth slightly open while coughing twice.  5. Spit any mucus out into a tissue.  6. Rest and repeat the steps once or twice as needed.  General instructions  · Make sure you receive all the vaccines that your health care provider recommends, especially the pneumococcal and influenza vaccines. Preventing infection and hospitalization is very important when you have COPD.  · Use oxygen therapy and pulmonary rehabilitation if directed to by your health care provider. If you require home oxygen therapy, ask your health care provider whether you should purchase a pulse oximeter to measure your oxygen level at  home.  · Work with your health care provider to develop a COPD action plan. This will help you know what steps to take if your condition gets worse.  · Keep other chronic health conditions under control as told by your health care provider.  · Avoid extreme temperature and humidity changes.  · Avoid contact with people who have an illness that spreads from person to person (is contagious), such as viral infections or pneumonia.  · Keep all follow-up visits as told by your health care provider. This is important.  Contact a health care provider if:  · You are coughing up more mucus than usual.  · There is a change in the color or thickness of your mucus.  · Your breathing is more labored than usual.  · Your breathing is faster than usual.  · You have difficulty sleeping.  · You need to use your rescue medicines or inhalers more often than expected.  · You have trouble doing routine activities such as getting dressed or walking around the house.  Get help right away if:  · You have shortness of breath while you are resting.  · You have shortness of breath that prevents you from:  ? Being able to talk.  ? Performing your usual physical activities.  · You have chest pain lasting longer than 5 minutes.  · Your skin color is more blue (cyanotic) than usual.  · You measure low oxygen saturations for longer than 5 minutes with a pulse oximeter.  · You have a fever.  · You feel too tired to breathe normally.  Summary  · Chronic obstructive pulmonary disease (COPD) is a long-term (chronic) condition that affects the lungs.  · Your lung function will probably never return to normal. In most cases, it gets worse over time. However, there are steps you can take to slow the progression of the disease and improve your quality of life.  · Treatment for COPD may include taking medicines, quitting smoking, pulmonary rehabilitation, and changes to diet and exercise. As the disease progresses, you may need oxygen therapy, a lung  "transplant, or palliative care.  · To help manage your condition, do not smoke, avoid exposure to things that irritate your lungs, stay up to date on all vaccines, and follow your health care provider's instructions for taking medicines.  This information is not intended to replace advice given to you by your health care provider. Make sure you discuss any questions you have with your health care provider.  Document Released: 09/27/2006 Document Revised: 06/13/2018 Document Reviewed: 01/22/2018  [employer] Interactive Patient Education © 2019 [employer]      Pulmonary Nodule  A pulmonary nodule is a small, round growth of tissue in the lung. It is sometimes referred to as a \"shadow\" or \"spot on the lung.\" Nodules range in size from less than 1/5 of an inch (4 mm) to a little bigger than an inch (30 mm).  Pulmonary nodules can be either noncancerous (benign) or cancerous (malignant). Most are noncancerous. Smaller nodules in people who do not smoke and do not have any other risk factors for lung cancer are more likely to be noncancerous. Larger, irregular nodules in people who smoke or who have a strong family history of lung cancer are more likely to be cancerous.  What are the causes?  This condition may be caused by:  · A bacterial, fungal, or viral infection, such as tuberculosis. The infection is usually an old and inactive one.  · A noncancerous mass of tissue.  · Inflammation from conditions such as rheumatoid arthritis.  · Abnormal blood vessels in the lungs.  · Cancerous tissue, such as lung cancer or a cancer in another part of the body that has spread to the lung.  What are the signs or symptoms?  This condition usually does not cause symptoms. If symptoms appear, they are usually related to the underlying cause. For example, if the condition is caused by an infection, you may have a cough or fever.  How is this diagnosed?  This condition is usually diagnosed with an X-ray or CT scan. To help determine " whether a pulmonary nodule is benign or malignant, your health care provider will:  · Take your medical history.  · Perform a physical exam.  · Order tests, including:  ? Blood tests.  ? A skin test called a tuberculin test. This test is done to check if you have been exposed to the germ that causes tuberculosis.  ? Chest X-rays.  ? A CT scan. This test shows smaller pulmonary nodules more clearly and with more detail than an X-ray.  ? A positron emission tomography (PET) scan. This test is done to check if the nodule is cancerous. During the test, a safe amount of a radioactive substance is injected into the bloodstream. Then a picture is taken.  ? Biopsy. In this test, a tiny piece of the pulmonary nodule is removed and then examined under a microscope.  How is this treated?  Treatment for this condition depends on whether the pulmonary nodule is malignant or benign as well as your risk of getting cancer.  · Noncancerous nodules usually do not need to be treated, but they may need to be monitored with CT scans. If a CT scan shows that the pulmonary nodule got bigger, more tests may be done.  · Some nodules need to be removed. If this is the case, you may have a procedure called a thoractomy. During the procedure, your health care provider will make an incision in your chest and remove the part of the lung where the nodule is located.  Follow these instructions at home:    · Take over-the-counter and prescription medicines only as told by your health care provider.  · Do not use any products that contain nicotine or tobacco, such as cigarettes and e-cigarettes. If you need help quitting, ask your health care provider.  · Keep all follow-up visits as told by your health care provider. This is important.  Contact a health care provider if:  · You have trouble breathing when you are active.  · You feel sick or unusually tired.  · You do not feel like eating.  · You lose weight without trying.  · You develop chills or  night sweats.  Get help right away if:  · You cannot catch your breath.  · You begin wheezing.  · You cannot stop coughing.  · You cough up blood.  · You become dizzy or feel like you are going to faint.  · You have sudden chest pain.  · You have a fever or persistent symptoms for more than 2-3 days.  · You have a fever and your symptoms suddenly get worse.  Summary  · A pulmonary nodule is a small, round growth of tissue in the lung. Most pulmonary nodules are noncancerous.  · This condition is usually diagnosed with an X-ray or CT scan.  · Common causes of pulmonary nodules include infection, inflammation, and noncancerous growths.  · Though less common, if a nodule is found to be cancerous, you will need specific diagnostic tests and treatment options as directed by your medical provider.  · Treatment for this condition depends on whether the pulmonary nodule is benign or malignant as well as your risk of getting cancer.  This information is not intended to replace advice given to you by your health care provider. Make sure you discuss any questions you have with your health care provider.  Document Released: 10/15/2010 Document Revised: 01/16/2018 Document Reviewed: 01/16/2018  [employer] Interactive Patient Education © 2019 [employer]

## 2019-09-10 DIAGNOSIS — K21.9 GASTROESOPHAGEAL REFLUX DISEASE WITHOUT ESOPHAGITIS: ICD-10-CM

## 2019-09-10 NOTE — PROGRESS NOTES
Dionte Verma is a 64 y.o. male.     Background: pt with hx mild copd and roxy nodule 2.5 cm identified 2012, tuberculosis identified on bronchoscopy 7/2012.  New nodule identified 12/2018, series of PET scan denials for uncertain reasons.  hx smoking.  Dr Leyva has asked me to evaluate.    Chief Complaint   Patient presents with   • Panlobular emphysema   • Lung Nodule        History of Present Illness   He was treated for tuberculosis in 2012, completed treatment.  More recently he underwent screening ct scan showing nodule.  He still smokes.  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   has a past surgical history that includes Tonsillectomy; Cholecystectomy; Colonoscopy; Esophagogastroduodenoscopy; and Patella fracture surgery.  family history includes Hypertension in his mother; No Known Problems in his father.   reports that he has been smoking cigarettes.  He has a 50.00 pack-year smoking history. He has never used smokeless tobacco. He reports that he drinks alcohol. He reports that he does not use drugs.  No Known Allergies  Medications    Current Outpatient Medications:   •  amLODIPine (NORVASC) 5 MG tablet, Take 1 tablet by mouth Daily., Disp: 90 tablet, Rfl: 1  •  citalopram (CeleXA) 20 MG tablet, TAKE ONE TABLET BY MOUTH EVERY DAY, Disp: 90 tablet, Rfl: 1  •  HM ASPIRIN 81 MG chewable tablet, CHEW AND SWALLOW ONE TABLET BY MOUTH EVERY DAY, Disp: 90 tablet, Rfl: 1  •  HYDROcodone-acetaminophen (NORCO) 7.5-325 MG per tablet, Take 1 tablet by mouth Every 12 (Twelve) Hours As Needed for Moderate Pain  or Severe Pain ., Disp: 60 tablet, Rfl: 0  •  metoprolol tartrate (LOPRESSOR) 50 MG tablet, TAKE ONE TABLET BY MOUTH TWICE A DAY, Disp: 180 tablet, Rfl: 1  •  raNITIdine (ZANTAC) 150 MG tablet, TAKE ONE TABLET BY MOUTH TWICE A DAY, Disp: 120  "tablet, Rfl: 1  •  simvastatin (ZOCOR) 20 MG tablet, TAKE ONE TABLET BY MOUTH EVERY DAY, Disp: 90 tablet, Rfl: 1    Review of Systems   Constitutional: Negative for chills and fever.   HENT: Negative for trouble swallowing and voice change.    Eyes: Negative for photophobia and visual disturbance.   Respiratory: Negative for cough, choking, shortness of breath and wheezing.    Gastrointestinal: Negative for abdominal pain, nausea, vomiting and GERD.   Genitourinary: Negative for difficulty urinating and hematuria.   Musculoskeletal: Positive for back pain ([facility] Negative for gait problem and joint swelling.   Skin: Negative for pallor and skin lesions.   Neurological: Negative for tremors, weakness and confusion.   Hematological: Negative for adenopathy. Does not bruise/bleed easily.   Psychiatric/Behavioral: The patient is not nervous/anxious.          Objective   /74   Pulse 70   Ht 190.5 cm (75\")   Wt 98.4 kg (217 lb)   SpO2 96% Comment: RA  BMI 27.12 kg/m²   Physical Exam   Constitutional: He appears well-developed and well-nourished. He does not appear ill. No distress.   HENT:   Head: Normocephalic and atraumatic.   Nose: Nose normal.   Eyes: Conjunctivae and EOM are normal.   Neck: Neck supple.   Cardiovascular: Normal rate, regular rhythm, S1 normal and S2 normal.   Pulmonary/Chest: Effort normal. He has no wheezes. He has no rales.   Abdominal: Soft. He exhibits no distension. There is no tenderness. There is no guarding.   Musculoskeletal: He exhibits no deformity.   Lymphadenopathy:     He has no cervical adenopathy.   Neurological: He is alert.   Skin: Skin is warm and dry. No rash noted.   Psychiatric: He has a normal mood and affect.        -----------------------------------------------------------------------------------------------  Recent Imaging:      CT CHEST LOW DOSE WO- 12/4/2018 7:57 AM CST     HISTORY: Lung cancer screening     COMPARISON: None   "   DOSE LENGTH PRODUCT: 61 mGy cm. Automated exposure control was also  utilized to decrease patient radiation dose.     TECHNIQUE: Noncontrast CT of the chest was performed using low-dose  protocol.     FINDINGS:      Nodules: There is a spiculated nodule in the LEFT upper lobe that  measures up to 1.6 cm. This is seen on a background of emphysema. A few  other ill-defined nodules are present in the RIGHT upper lobe. These  measure approximately 5 mm in diameter.     Other lung findings: Emphysema.     Airway: The airway is patent.     Pleura: No mass or fluid collection.     Aorta and great vessels: Atherosclerosis is seen. There is no aneurysm  identified.     Heart and pericardium: The heart is normal in size. Coronary artery  calcifications are seen. No pericardial effusion.     Lymph nodes: No pathologic lymphadenopathy is noted.     Bones and soft tissues: No acute osseous or soft tissue abnormality is  seen.     Upper abdomen: No acute process is seen in the upper abdomen.     IMPRESSION:  1. Suspicious, spiculated nodule in the LEFT upper lobe seen on a  background of emphysema. This would be difficult to access via  percutaneous or bronchoscopic biopsy. PET CT is recommended.  2. Lung-RADS 4: Suspicious     Findings for which additional diagnostic testing and/or tissue sampling  is recommended.     Management recommendation: Chest CT with or without contrast, PET/CT  and/or tissue sampling depending on the probability of malignancy and  comorbidities. PET/CT may be used when there is a solid component  measuring 8 mm or greater.        Lung-rads Categories and Recommendations:     1: Negative -- annual follow-up.  2: Benign appearance -- annual follow-up.  3: Probably benign -- six-month follow-up.  4A: Suspicious -- 3 month follow-up. PET/CT may be used when there is a  a solid component of at least 8 mm.  4B: Suspicious (greater than 15% chance of malignancy) -- consider  tissue sampling. Chest CT with or  without contrast, PET/CT or tissue  sampling depending on the probability of malignancy and comorbidities.     This report was finalized on 12/04/2018 08:11 by Dr. Ryan Wilkins MD.    My interpretation: spiculated nodule in BIJAL  -----------------------------------------------------------------------------------------------    Assessment/Plan   Problem List Items Addressed This Visit        Respiratory    Left upper lobe pulmonary nodule - Primary       Digestive    Gastroesophageal reflux disease without esophagitis       Nervous and Auditory    Lumbar back pain       Other    History of primary tuberculosis    Personal history of nicotine dependence        Patient's Body mass index is 27.12 kg/m². BMI is within normal parameters. No follow-up required..      He has hx tuberculosis.  Nodule could be remnant of that, but could be new finding, could be malignancy  Will get follow up ct and try to have that compared with 2012 films.  Hold off pulmonary testing or meds in absence of symptoms  Outside records from Dr. Leyva are reviewed showing finding of lung nodule and letters of denial for additional scanning  Advise stop smoking, recommend otc aids.  Has tried chantix and did not tolerate.       Electronically signed by Rj Fritz MD, 9/12/2019, 4:08 PM

## 2019-09-11 RX ORDER — RANITIDINE 150 MG/1
TABLET ORAL
Qty: 120 TABLET | Refills: 1 | Status: SHIPPED | OUTPATIENT
Start: 2019-09-11 | End: 2019-12-12 | Stop reason: ALTCHOICE

## 2019-09-12 ENCOUNTER — OFFICE VISIT (OUTPATIENT)
Dept: PULMONOLOGY | Facility: CLINIC | Age: 64
End: 2019-09-12

## 2019-09-12 VITALS
BODY MASS INDEX: 26.98 KG/M2 | WEIGHT: 217 LBS | SYSTOLIC BLOOD PRESSURE: 132 MMHG | DIASTOLIC BLOOD PRESSURE: 74 MMHG | HEART RATE: 70 BPM | HEIGHT: 75 IN | OXYGEN SATURATION: 96 %

## 2019-09-12 DIAGNOSIS — K21.9 GASTROESOPHAGEAL REFLUX DISEASE WITHOUT ESOPHAGITIS: ICD-10-CM

## 2019-09-12 DIAGNOSIS — M54.50 LUMBAR BACK PAIN: ICD-10-CM

## 2019-09-12 DIAGNOSIS — Z86.11: ICD-10-CM

## 2019-09-12 DIAGNOSIS — Z87.891 PERSONAL HISTORY OF NICOTINE DEPENDENCE: ICD-10-CM

## 2019-09-12 DIAGNOSIS — R91.1 LEFT UPPER LOBE PULMONARY NODULE: Primary | ICD-10-CM

## 2019-09-12 PROCEDURE — 99204 OFFICE O/P NEW MOD 45 MIN: CPT | Performed by: INTERNAL MEDICINE

## 2019-10-04 ENCOUNTER — HOSPITAL ENCOUNTER (OUTPATIENT)
Dept: CT IMAGING | Facility: HOSPITAL | Age: 64
End: 2019-10-04

## 2019-10-08 ENCOUNTER — HOSPITAL ENCOUNTER (OUTPATIENT)
Dept: CT IMAGING | Facility: HOSPITAL | Age: 64
Discharge: HOME | End: 2019-10-08
Admitting: INTERNAL MEDICINE

## 2019-10-08 DIAGNOSIS — R91.1 LEFT UPPER LOBE PULMONARY NODULE: ICD-10-CM

## 2019-10-08 DIAGNOSIS — Z86.11: ICD-10-CM

## 2019-10-08 PROCEDURE — 71250 CT THORAX DX C-: CPT

## 2019-10-09 NOTE — PROGRESS NOTES
Please call the patient regarding his abnormal result.  Nothing drastic.  Nodule still there and a couple of new very small nodules that we will need to recheck later.  Keep follow up appt to discuss further.

## 2019-10-11 ENCOUNTER — OFFICE VISIT (OUTPATIENT)
Dept: FAMILY MEDICINE CLINIC | Facility: CLINIC | Age: 64
End: 2019-10-11

## 2019-10-11 VITALS
SYSTOLIC BLOOD PRESSURE: 136 MMHG | WEIGHT: 217.6 LBS | DIASTOLIC BLOOD PRESSURE: 68 MMHG | BODY MASS INDEX: 27.06 KG/M2 | HEART RATE: 66 BPM | HEIGHT: 75 IN | OXYGEN SATURATION: 93 %

## 2019-10-11 DIAGNOSIS — J43.1 PANLOBULAR EMPHYSEMA (HCC): ICD-10-CM

## 2019-10-11 DIAGNOSIS — R91.1 LEFT UPPER LOBE PULMONARY NODULE: Primary | ICD-10-CM

## 2019-10-11 DIAGNOSIS — I10 ESSENTIAL HYPERTENSION: ICD-10-CM

## 2019-10-11 PROCEDURE — 99213 OFFICE O/P EST LOW 20 MIN: CPT | Performed by: FAMILY MEDICINE

## 2019-10-11 NOTE — PROGRESS NOTES
OFFICE VISIT NOTE:    Rupa Verma is a 64 y.o. male who presents today for Lung Nodule (6 week f/u).     Following up on lung nodule - prior CT chest reviewed. Discussed need for routine FU and smoking cessation.          Past medical/surgical history, Family history, Social history, Allergies and Medications have been reviewed with the patient today and are updated in [facility] EMR. See below.    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   • Left upper lobe pulmonary nodule 9/12/2019     Past Surgical History:   Procedure Laterality Date   • CHOLECYSTECTOMY     • COLONOSCOPY     • ENDOSCOPY     • PATELLA FRACTURE SURGERY     • TONSILLECTOMY       Family History   Problem Relation Age of Onset   • Hypertension Mother    • No Known Problems Father    • Diabetes Neg Hx    • Cancer Neg Hx    • Stroke Neg Hx      Social History     Tobacco Use   • Smoking status: Current Every Day Smoker     Packs/day: 1.00     Years: 50.00     Pack years: 50.00     Types: Cigarettes   • Smokeless tobacco: Never Used   Substance Use Topics   • Alcohol use: Yes     Comment: occ   • Drug use: No       Allergies:  Patient has no known allergies.    Current Meds:    Current Outpatient Medications:   •  amLODIPine (NORVASC) 5 MG tablet, Take 1 tablet by mouth Daily., Disp: 90 tablet, Rfl: 1  •  citalopram (CeleXA) 20 MG tablet, TAKE ONE TABLET BY MOUTH EVERY DAY, Disp: 90 tablet, Rfl: 1  •  HM ASPIRIN 81 MG chewable tablet, CHEW AND SWALLOW ONE TABLET BY MOUTH EVERY DAY, Disp: 90 tablet, Rfl: 1  •  HYDROcodone-acetaminophen (NORCO) 7.5-325 MG per tablet, Take 1 tablet by mouth Every 12 (Twelve) Hours As Needed for Moderate Pain  or Severe Pain ., Disp: 60 tablet, Rfl: 0  •  metoprolol tartrate (LOPRESSOR) 50 MG tablet, TAKE ONE TABLET BY MOUTH TWICE A DAY, Disp: 180 tablet, Rfl: 1  •  raNITIdine (ZANTAC) 150 MG tablet, TAKE ONE TABLET BY MOUTH TWICE A DAY, Disp: 120 tablet, Rfl: 1  •   "simvastatin (ZOCOR) 20 MG tablet, TAKE ONE TABLET BY MOUTH EVERY DAY, Disp: 90 tablet, Rfl: 1    Review of Systems:  Review of Systems   Constitutional: Negative for activity change, appetite change, fatigue, fever, unexpected weight gain and unexpected weight loss.   Respiratory: Negative for shortness of breath.    Cardiovascular: Negative for chest pain.   Gastrointestinal: Negative for abdominal pain.   Genitourinary: Negative for difficulty urinating.   Skin: Negative for rash.   Neurological: Negative for syncope and headache.       Physical Examination:  Vital Signs:  /68 (BP Location: Left arm, Patient Position: Sitting, Cuff Size: Adult)   Pulse 66   Ht 190.5 cm (75\")   Wt 98.7 kg (217 lb 9.6 oz)   SpO2 93%   BMI 27.20 kg/m²   Physical Exam   Constitutional: He is oriented to person, place, and time. He appears well-developed and well-nourished. No distress.   HENT:   Head: Normocephalic and atraumatic.   Mouth/Throat: Oropharynx is clear and moist.   Neck: Normal range of motion. Neck supple. No JVD present.   Cardiovascular: Normal rate, regular rhythm, normal heart sounds and intact distal pulses.   Pulmonary/Chest: Effort normal. No respiratory distress. He has wheezes.   Abdominal: Soft. He exhibits no distension. There is no tenderness.   Musculoskeletal: Normal range of motion. He exhibits no edema.   Neurological: He is alert and oriented to person, place, and time. No cranial nerve deficit.   Skin: Skin is warm and dry. Capillary refill takes less than 2 seconds. No rash noted.   Psychiatric: He has a normal mood and affect. His behavior is normal.   Nursing note and vitals reviewed.      Procedures    ASSESSMENT/ PLAN:        Problem List Items Addressed This Visit        Cardiovascular and Mediastinum    Essential hypertension       Respiratory    Left upper lobe pulmonary nodule - Primary    Panlobular emphysema (CMS/HCC)                   Specific Patient Instructions:  MEDICATION " Instructions: Encouraged patient to continue routine medicines as prescribed and maintain compliance. Patient instructed to report any adverse side effects or reactions to medicines promptly to the office. Patient instructed to make us aware of any OTC or herbal meds or supplement use.  DIET Recommendations: Patient instructed and provided information on the following nutrition and DIET(s): Calorie restriction for weight reduction and maintenance. Necessity for adequate daily intake of fluids/water.  EXERCISE Instructions: Discussed with patient the need for routine aerobic activity for cardiovascular fitness, 3 times a week for about 30 minutes. Daily exercise for increased fitness and weight reduction goals.    Patient's Body mass index is 27.2 kg/m². BMI is above normal parameters. Recommendations include: exercise counseling and nutrition counseling.      SMOKING Recommendations: Counseled patient and encouraged them on smoking cessation. Discussed the benefits to all body systems with smoking cessation, including decreased cardiac/lung/stroke/cancer risk.  HEALTH MAINTENANCE:  Counseling provided to patient/family about routine health maintenance and ANNUAL physicals/labs. Counseling on recommended Vaccinations appropriate for age needed.  MISCELLANEOUS Instructions: N/A          Medications ordered or changed this visit:  No orders of the defined types were placed in this encounter.       FOLLOW-UP:  Return in about 6 months (around 4/11/2020) for Recheck.    I discussed the patients findings and my recommendations with patient.  An After Visit Summary (AVS) was printed and given to the patient at discharge.      Jose Leyva MD, FAAFP  10/16/2019

## 2019-10-11 NOTE — PATIENT INSTRUCTIONS
"Pulmonary Nodule  A pulmonary nodule is a small, round growth of tissue in the lung. It is sometimes referred to as a \"shadow\" or \"spot on the lung.\" Nodules range in size from less than 1/5 of an inch (4 mm) to a little bigger than an inch (30 mm).  Pulmonary nodules can be either noncancerous (benign) or cancerous (malignant). Most are noncancerous. Smaller nodules in people who do not smoke and do not have any other risk factors for lung cancer are more likely to be noncancerous. Larger, irregular nodules in people who smoke or who have a strong family history of lung cancer are more likely to be cancerous.  What are the causes?  This condition may be caused by:  · A bacterial, fungal, or viral infection, such as tuberculosis. The infection is usually an old and inactive one.  · A noncancerous mass of tissue.  · Inflammation from conditions such as rheumatoid arthritis.  · Abnormal blood vessels in the lungs.  · Cancerous tissue, such as lung cancer or a cancer in another part of the body that has spread to the lung.  What are the signs or symptoms?  This condition usually does not cause symptoms. If symptoms appear, they are usually related to the underlying cause. For example, if the condition is caused by an infection, you may have a cough or fever.  How is this diagnosed?  This condition is usually diagnosed with an X-ray or CT scan. To help determine whether a pulmonary nodule is benign or malignant, your health care provider will:  · Take your medical history.  · Perform a physical exam.  · Order tests, including:  ? Blood tests.  ? A skin test called a tuberculin test. This test is done to check if you have been exposed to the germ that causes tuberculosis.  ? Chest X-rays.  ? A CT scan. This test shows smaller pulmonary nodules more clearly and with more detail than an X-ray.  ? A positron emission tomography (PET) scan. This test is done to check if the nodule is cancerous. During the test, a safe amount " of a radioactive substance is injected into the bloodstream. Then a picture is taken.  ? Biopsy. In this test, a tiny piece of the pulmonary nodule is removed and then examined under a microscope.  How is this treated?  Treatment for this condition depends on whether the pulmonary nodule is malignant or benign as well as your risk of getting cancer.  · Noncancerous nodules usually do not need to be treated, but they may need to be monitored with CT scans. If a CT scan shows that the pulmonary nodule got bigger, more tests may be done.  · Some nodules need to be removed. If this is the case, you may have a procedure called a thoractomy. During the procedure, your health care provider will make an incision in your chest and remove the part of the lung where the nodule is located.  Follow these instructions at home:    · Take over-the-counter and prescription medicines only as told by your health care provider.  · Do not use any products that contain nicotine or tobacco, such as cigarettes and e-cigarettes. If you need help quitting, ask your health care provider.  · Keep all follow-up visits as told by your health care provider. This is important.  Contact a health care provider if:  · You have trouble breathing when you are active.  · You feel sick or unusually tired.  · You do not feel like eating.  · You lose weight without trying.  · You develop chills or night sweats.  Get help right away if:  · You cannot catch your breath.  · You begin wheezing.  · You cannot stop coughing.  · You cough up blood.  · You become dizzy or feel like you are going to faint.  · You have sudden chest pain.  · You have a fever or persistent symptoms for more than 2-3 days.  · You have a fever and your symptoms suddenly get worse.  Summary  · A pulmonary nodule is a small, round growth of tissue in the lung. Most pulmonary nodules are noncancerous.  · This condition is usually diagnosed with an X-ray or CT scan.  · Common causes of  pulmonary nodules include infection, inflammation, and noncancerous growths.  · Though less common, if a nodule is found to be cancerous, you will need specific diagnostic tests and treatment options as directed by your medical provider.  · Treatment for this condition depends on whether the pulmonary nodule is benign or malignant as well as your risk of getting cancer.  This information is not intended to replace advice given to you by your health care provider. Make sure you discuss any questions you have with your health care provider.  Document Released: 10/15/2010 Document Revised: 01/16/2018 Document Reviewed: 01/16/2018  [employer] Interactive Patient Education © 2019 [employer]

## 2019-11-12 DIAGNOSIS — E78.5 HYPERLIPIDEMIA LDL GOAL <100: ICD-10-CM

## 2019-11-12 DIAGNOSIS — F32.89 OTHER DEPRESSION: ICD-10-CM

## 2019-11-12 DIAGNOSIS — I10 ESSENTIAL HYPERTENSION: ICD-10-CM

## 2019-11-12 RX ORDER — CITALOPRAM 20 MG/1
TABLET ORAL
Qty: 90 TABLET | Refills: 1 | Status: SHIPPED | OUTPATIENT
Start: 2019-11-12 | End: 2020-05-14

## 2019-11-12 RX ORDER — METOPROLOL TARTRATE 50 MG/1
TABLET, FILM COATED ORAL
Qty: 180 TABLET | Refills: 1 | Status: SHIPPED | OUTPATIENT
Start: 2019-11-12 | End: 2020-05-14

## 2019-11-12 RX ORDER — SIMVASTATIN 20 MG
TABLET ORAL
Qty: 90 TABLET | Refills: 1 | Status: SHIPPED | OUTPATIENT
Start: 2019-11-12 | End: 2020-05-14

## 2019-12-09 ENCOUNTER — CLINICAL SUPPORT (OUTPATIENT)
Dept: FAMILY MEDICINE CLINIC | Facility: CLINIC | Age: 64
End: 2019-12-09

## 2019-12-09 DIAGNOSIS — Z00.00 ANNUAL PHYSICAL EXAM: Primary | ICD-10-CM

## 2019-12-09 DIAGNOSIS — I10 ESSENTIAL HYPERTENSION: ICD-10-CM

## 2019-12-09 DIAGNOSIS — Z12.5 SPECIAL SCREENING FOR MALIGNANT NEOPLASM OF PROSTATE: ICD-10-CM

## 2019-12-09 DIAGNOSIS — E78.5 HYPERLIPIDEMIA LDL GOAL <100: ICD-10-CM

## 2019-12-10 LAB
ALBUMIN SERPL-MCNC: 4.4 G/DL (ref 3.6–4.8)
ALBUMIN/GLOB SERPL: 1.8 {RATIO} (ref 1.2–2.2)
ALP SERPL-CCNC: 92 IU/L (ref 39–117)
ALT SERPL-CCNC: 32 IU/L (ref 0–44)
AST SERPL-CCNC: 33 IU/L (ref 0–40)
BASOPHILS # BLD AUTO: 0.1 X10E3/UL (ref 0–0.2)
BASOPHILS NFR BLD AUTO: 1 %
BILIRUB SERPL-MCNC: 0.6 MG/DL (ref 0–1.2)
BUN SERPL-MCNC: 8 MG/DL (ref 8–27)
BUN/CREAT SERPL: 10 (ref 10–24)
CALCIUM SERPL-MCNC: 9.8 MG/DL (ref 8.6–10.2)
CHLORIDE SERPL-SCNC: 98 MMOL/L (ref 96–106)
CHOLEST SERPL-MCNC: 158 MG/DL (ref 100–199)
CO2 SERPL-SCNC: 19 MMOL/L (ref 20–29)
CREAT SERPL-MCNC: 0.84 MG/DL (ref 0.76–1.27)
EOSINOPHIL # BLD AUTO: 0.2 X10E3/UL (ref 0–0.4)
EOSINOPHIL NFR BLD AUTO: 2 %
ERYTHROCYTE [DISTWIDTH] IN BLOOD BY AUTOMATED COUNT: 13.4 % (ref 12.3–15.4)
GLOBULIN SER CALC-MCNC: 2.4 G/DL (ref 1.5–4.5)
GLUCOSE SERPL-MCNC: 117 MG/DL (ref 65–99)
HCT VFR BLD AUTO: 47.7 % (ref 37.5–51)
HDLC SERPL-MCNC: 58 MG/DL
HGB BLD-MCNC: 16.1 G/DL (ref 13–17.7)
IMM GRANULOCYTES # BLD AUTO: 0 X10E3/UL (ref 0–0.1)
IMM GRANULOCYTES NFR BLD AUTO: 0 %
LDLC SERPL CALC-MCNC: 85 MG/DL (ref 0–99)
LDLC/HDLC SERPL: 1.5 RATIO (ref 0–3.6)
LYMPHOCYTES # BLD AUTO: 1.1 X10E3/UL (ref 0.7–3.1)
LYMPHOCYTES NFR BLD AUTO: 12 %
MCH RBC QN AUTO: 33 PG (ref 26.6–33)
MCHC RBC AUTO-ENTMCNC: 33.8 G/DL (ref 31.5–35.7)
MCV RBC AUTO: 98 FL (ref 79–97)
MONOCYTES # BLD AUTO: 0.7 X10E3/UL (ref 0.1–0.9)
MONOCYTES NFR BLD AUTO: 8 %
NEUTROPHILS # BLD AUTO: 7.5 X10E3/UL (ref 1.4–7)
NEUTROPHILS NFR BLD AUTO: 77 %
PLATELET # BLD AUTO: 243 X10E3/UL (ref 150–450)
POTASSIUM SERPL-SCNC: 4.7 MMOL/L (ref 3.5–5.2)
PROT SERPL-MCNC: 6.8 G/DL (ref 6–8.5)
PSA SERPL-MCNC: 0.2 NG/ML (ref 0–4)
RBC # BLD AUTO: 4.88 X10E6/UL (ref 4.14–5.8)
SODIUM SERPL-SCNC: 136 MMOL/L (ref 134–144)
T4 SERPL-MCNC: 6.5 UG/DL (ref 4.5–12)
TRIGL SERPL-MCNC: 74 MG/DL (ref 0–149)
TSH SERPL DL<=0.005 MIU/L-ACNC: 1.32 UIU/ML (ref 0.45–4.5)
VLDLC SERPL CALC-MCNC: 15 MG/DL (ref 5–40)
WBC # BLD AUTO: 9.5 X10E3/UL (ref 3.4–10.8)

## 2019-12-12 ENCOUNTER — TELEPHONE (OUTPATIENT)
Dept: FAMILY MEDICINE CLINIC | Facility: CLINIC | Age: 64
End: 2019-12-12

## 2019-12-12 RX ORDER — FAMOTIDINE 20 MG/1
20 TABLET, FILM COATED ORAL 2 TIMES DAILY
Qty: 60 TABLET | Refills: 5 | Status: SHIPPED | OUTPATIENT
Start: 2019-12-12 | End: 2020-06-10

## 2019-12-12 NOTE — TELEPHONE ENCOUNTER
Valorie with [employer] [facility] called asking if you can change Pt's Zantac 150 BID to Pepcid 40mg? Please advise.

## 2019-12-13 ENCOUNTER — OFFICE VISIT (OUTPATIENT)
Dept: FAMILY MEDICINE CLINIC | Facility: CLINIC | Age: 64
End: 2019-12-13

## 2019-12-13 VITALS
BODY MASS INDEX: 27.6 KG/M2 | WEIGHT: 222 LBS | OXYGEN SATURATION: 98 % | HEART RATE: 80 BPM | SYSTOLIC BLOOD PRESSURE: 130 MMHG | DIASTOLIC BLOOD PRESSURE: 62 MMHG | HEIGHT: 75 IN

## 2019-12-13 DIAGNOSIS — I10 ESSENTIAL HYPERTENSION: ICD-10-CM

## 2019-12-13 DIAGNOSIS — E78.5 HYPERLIPIDEMIA LDL GOAL <100: ICD-10-CM

## 2019-12-13 DIAGNOSIS — Z00.00 ANNUAL PHYSICAL EXAM: Primary | ICD-10-CM

## 2019-12-13 DIAGNOSIS — K21.9 GASTROESOPHAGEAL REFLUX DISEASE WITHOUT ESOPHAGITIS: ICD-10-CM

## 2019-12-13 DIAGNOSIS — J43.1 PANLOBULAR EMPHYSEMA (HCC): ICD-10-CM

## 2019-12-13 DIAGNOSIS — M54.50 LUMBAR BACK PAIN: ICD-10-CM

## 2019-12-13 DIAGNOSIS — Z87.891 PERSONAL HISTORY OF NICOTINE DEPENDENCE: ICD-10-CM

## 2019-12-13 PROCEDURE — 99396 PREV VISIT EST AGE 40-64: CPT | Performed by: FAMILY MEDICINE

## 2019-12-13 RX ORDER — RANITIDINE 150 MG/1
1 TABLET ORAL DAILY
COMMUNITY
End: 2020-12-16 | Stop reason: ALTCHOICE

## 2019-12-13 NOTE — PATIENT INSTRUCTIONS
Suspect Essential HTN.Good BP control is encouraged with Goal BP based on JNC 8 guidelines 2014 <140/90 for patients with known cardiac disease and diabetes. (MARINA. 2014:322 (5):507-520. doi:10.1001/marina.2013.44455): general population <60 yr old goal BP <140/90 and for those >60 <150/90.  For patients of all ages with Diabetes, CKD, Known CAD <140/90. Recommended to the patient to obtain electronic home BP machine with upper arm blood pressure cuff and to check regularly as instructed.  Keep BP log and bring to subsequent visits. Stable, at goal.  a. LABS: routine for hypertension recommended and ordered if necessary.  b. Recommend if you do not have a home BP machine to obtain an electronic machine with arm blood pressure cuff.      c. Monitor BP over the next week and keep log to bring back to office. Discussed medication therapy however pt wants to try to control with diet exercise. .  Your provider  has recommended self-monitoring of your blood pressure.  If you do not have a blood pressure cuff you may purchase one from the local pharmacy.  You may ask the pharmacist which brand and model they recommend.  Obtain your blood pressure measurement at least 2x per week.  You should also check your blood pressure if you experience any symptoms of blurred visit, dizziness or headache.  Please record all blood pressure measurements and bring them to next office visit.  If you have any questions about the accuracy of your blood pressure machine please bring it in to the office and our staff will be happy to check accuracy.   d. Encouraged to eat a low sodium heart healthy diet  e. Offered handout on HTN educational topics.  These were provided if patient requested these today.  f. MEDS: as listed in today's visit.  g. Risks/benefits of current and new medications discussed with the patient and or family today.  The patient/family are aware and accept that if there any side effects they should call or return to clinic  as soon as possible.  Appropriate F/U discussed for topics addressed today. All questions were answered to the  satisfactory state of patient/family.  Should symptoms fail to improve or worsen they agree to call or return to clinic or to go to the ER. Education handouts were offered on any new Rx if requested.  Discussed the importance of following up with any needed screening tests/labs/specialist appointments and any requested follow-up recommended by me today.  Importance of maintaining follow-up discussed and patient accepts that missed appointments can delay diagnosis and potentially lead to worsening of conditions.      Cholesterol  Cholesterol is a white, waxy, fat-like substance that is needed by the human body in small amounts. The liver makes all the cholesterol we need. Cholesterol is carried from the liver by the blood through the blood vessels. Deposits of cholesterol (plaques) may build up on blood vessel (artery) walls. Plaques make the arteries narrower and stiffer. Cholesterol plaques increase the risk for heart attack and stroke.  You cannot feel your cholesterol level even if it is very high. The only way to know that it is high is to have a blood test. Once you know your cholesterol levels, you should keep a record of the test results. Work with your health care provider to keep your levels in the desired range.  What do the results mean?  · Total cholesterol is a rough measure of all the cholesterol in your blood.  · LDL (low-density lipoprotein) is the “bad” cholesterol. This is the type that causes plaque to build up on the artery walls. You want this level to be low.  · HDL (high-density lipoprotein) is the “good” cholesterol because it cleans the arteries and carries the LDL away. You want this level to be high.  · Triglycerides are fat that the body can either burn for energy or store. High levels are closely linked to heart disease.  What are the desired levels of cholesterol?  · Total  cholesterol below 200.  · LDL below 100 for people who are at risk, below 70 for people at very high risk.  · HDL above 40 is good. A level of 60 or higher is considered to be protective against heart disease.  · Triglycerides below 150.  How can I lower my cholesterol?  Diet  Follow your diet program as told by your health care provider.  · Choose fish or white meat chicken and turkey, roasted or baked. Limit fatty cuts of red meat, fried foods, and processed meats, such as sausage and lunch meats.  · Eat lots of fresh fruits and vegetables.  · Choose whole grains, beans, pasta, potatoes, and cereals.  · Choose olive oil, corn oil, or canola oil, and use only small amounts.  · Avoid butter, mayonnaise, shortening, or palm kernel oils.  · Avoid foods with trans fats.  · Drink skim or nonfat milk and eat low-fat or nonfat yogurt and cheeses. Avoid whole milk, cream, ice cream, egg yolks, and full-fat cheeses.  · Healthier desserts include karri food cake, isa snaps, animal crackers, hard candy, popsicles, and low-fat or nonfat frozen yogurt. Avoid pastries, cakes, pies, and cookies.    Exercise  · Follow your exercise program as told by your health care provider. A regular program:  ? Helps to decrease LDL and raise HDL.  ? Helps with weight control.  · Do things that increase your activity level, such as gardening, walking, and taking the stairs.  · Ask your health care provider about ways that you can be more active in your daily life.  Medicine  · Take over-the-counter and prescription medicines only as told by your health care provider.  ? Medicine may be prescribed by your health care provider to help lower cholesterol and decrease the risk for heart disease. This is usually done if diet and exercise have failed to bring down cholesterol levels.  ? If you have several risk factors, you may need medicine even if your levels are normal.  This information is not intended to replace advice given to you by your  health care provider. Make sure you discuss any questions you have with your health care provider.  Document Released: 09/12/2002 Document Revised: 07/15/2017 Document Reviewed: 06/17/2017  [employer] Interactive Patient Education © 2019 [employer]      Chronic Obstructive Pulmonary Disease    Chronic obstructive pulmonary disease (COPD) is a long-term (chronic) condition that affects the lungs. COPD is a general term that can be used to describe many different lung problems that cause lung swelling (inflammation) and limit airflow, including chronic bronchitis and emphysema. If you have COPD, your lung function will probably never return to normal. In most cases, it gets worse over time. However, there are steps you can take to slow the progression of the disease and improve your quality of life.  What are the causes?  This condition may be caused by:  · Smoking. This is the most common cause.  · Certain genes passed down through families.  What increases the risk?  The following factors may make you more likely to develop this condition:  · Secondhand smoke from cigarettes, pipes, or cigars.  · Exposure to chemicals and other irritants such as fumes and dust in the work environment.  · Chronic lung conditions or infections.  What are the signs or symptoms?  Symptoms of this condition include:  · Shortness of breath, especially during physical activity.  · Chronic cough with a large amount of thick mucus. Sometimes the cough may not have any mucus (dry cough).  · Wheezing.  · Rapid breaths.  · Gray or bluish discoloration (cyanosis) of the skin, especially in your fingers, toes, or lips.  · Feeling tired (fatigue).  · Weight loss.  · Chest tightness.  · Frequent infections.  · Episodes when breathing symptoms become much worse (exacerbations).  · Swelling in the ankles, feet, or legs. This may occur in later stages of the disease.  How is this diagnosed?  This condition is diagnosed based on:  · Your medical  history.  · A physical exam.  You may also have tests, including:  · Lung (pulmonary) function tests. This may include a spirometry test, which measures your ability to exhale properly.  · Chest X-ray.  · CT scan.  · Blood tests.  How is this treated?  This condition may be treated with:  · Medicines. These may include inhaled rescue medicines to treat acute exacerbations as well as long-term, or maintenance, medicines to prevent flare-ups of COPD.  ? Bronchodilators help treat COPD by dilating the airways to allow increased airflow and make your breathing more comfortable.  ? Steroids can reduce airway inflammation and help prevent exacerbations.  · Smoking cessation. If you smoke, your health care provider may ask you to quit, and may also recommend therapy or replacement products to help you quit.  · Pulmonary rehabilitation. This may involve working with a team of health care providers and specialists, such as respiratory, occupational, and physical therapists.  · Exercise and physical activity. These are beneficial for nearly all people with COPD.  · Nutrition therapy to gain weight, if you are underweight.  · Oxygen. Supplemental oxygen therapy is only helpful if you have a low oxygen level in your blood (hypoxemia).  · Lung surgery or transplant.  · Palliative care. This is to help people with COPD feel comfortable when treatment is no longer working.  Follow these instructions at home:  Medicines  · Take over-the-counter and prescription medicines (inhaled or pills) only as told by your health care provider.  · Talk to your health care provider before taking any cough or allergy medicines. You may need to avoid certain medicines that dry out your airways.  Lifestyle  · If you are a smoker, the most important thing that you can do is to stop smoking. Do not use any products that contain nicotine or tobacco, such as cigarettes and e-cigarettes. If you need help quitting, ask your health care provider.  Continuing to smoke will cause the disease to progress faster.  · Avoid exposure to things that irritate your lungs, such as smoke, chemicals, and fumes.  · Stay active, but balance activity with periods of rest. Exercise and physical activity will help you maintain your ability to do things you want to do.  · Learn and use relaxation techniques to manage stress and to control your breathing.  · Get the right amount of sleep and get quality sleep. Most adults need 7 or more hours per night.  · Eat healthy foods. Eating smaller, more frequent meals and resting before meals may help you maintain your strength.  Controlled breathing  Learn and use controlled breathing techniques as directed by your health care provider. Controlled breathing techniques include:  · Pursed lip breathing. Start by breathing in (inhaling) through your nose for 1 second. Then, purse your lips as if you were going to whistle and breathe out (exhale) through the pursed lips for 2 seconds.  · Diaphragmatic breathing. Start by putting one hand on your abdomen just above your waist. Inhale slowly through your nose. The hand on your abdomen should move out. Then purse your lips and exhale slowly. You should be able to feel the hand on your abdomen moving in as you exhale.  Controlled coughing  Learn and use controlled coughing to clear mucus from your lungs. Controlled coughing is a series of short, progressive coughs. The steps of controlled coughing are:  1. Lean your head slightly forward.  2. Breathe in deeply using diaphragmatic breathing.  3. Try to hold your breath for 3 seconds.  4. Keep your mouth slightly open while coughing twice.  5. Spit any mucus out into a tissue.  6. Rest and repeat the steps once or twice as needed.  General instructions  · Make sure you receive all the vaccines that your health care provider recommends, especially the pneumococcal and influenza vaccines. Preventing infection and hospitalization is very important  when you have COPD.  · Use oxygen therapy and pulmonary rehabilitation if directed to by your health care provider. If you require home oxygen therapy, ask your health care provider whether you should purchase a pulse oximeter to measure your oxygen level at home.  · Work with your health care provider to develop a COPD action plan. This will help you know what steps to take if your condition gets worse.  · Keep other chronic health conditions under control as told by your health care provider.  · Avoid extreme temperature and humidity changes.  · Avoid contact with people who have an illness that spreads from person to person (is contagious), such as viral infections or pneumonia.  · Keep all follow-up visits as told by your health care provider. This is important.  Contact a health care provider if:  · You are coughing up more mucus than usual.  · There is a change in the color or thickness of your mucus.  · Your breathing is more labored than usual.  · Your breathing is faster than usual.  · You have difficulty sleeping.  · You need to use your rescue medicines or inhalers more often than expected.  · You have trouble doing routine activities such as getting dressed or walking around the house.  Get help right away if:  · You have shortness of breath while you are resting.  · You have shortness of breath that prevents you from:  ? Being able to talk.  ? Performing your usual physical activities.  · You have chest pain lasting longer than 5 minutes.  · Your skin color is more blue (cyanotic) than usual.  · You measure low oxygen saturations for longer than 5 minutes with a pulse oximeter.  · You have a fever.  · You feel too tired to breathe normally.  Summary  · Chronic obstructive pulmonary disease (COPD) is a long-term (chronic) condition that affects the lungs.  · Your lung function will probably never return to normal. In most cases, it gets worse over time. However, there are steps you can take to slow the  progression of the disease and improve your quality of life.  · Treatment for COPD may include taking medicines, quitting smoking, pulmonary rehabilitation, and changes to diet and exercise. As the disease progresses, you may need oxygen therapy, a lung transplant, or palliative care.  · To help manage your condition, do not smoke, avoid exposure to things that irritate your lungs, stay up to date on all vaccines, and follow your health care provider's instructions for taking medicines.  This information is not intended to replace advice given to you by your health care provider. Make sure you discuss any questions you have with your health care provider.  Document Released: 09/27/2006 Document Revised: 06/13/2018 Document Reviewed: 01/22/2018  [employer] Interactive Patient Education © 2019 [employer]

## 2019-12-13 NOTE — PROGRESS NOTES
OFFICE VISIT NOTE:    Rupa Verma is a 64 y.o. male who presents today for Annual Exam.     Here for annual exam - labs reviewed - normal.   Having some continued back issues - goes to get his injections, but only gets about 2-3 days from it.   COPD   He complains of cough, difficulty breathing, shortness of breath, sputum production and wheezing. There is no hemoptysis. This is a chronic problem. The current episode started more than 1 year ago. The problem occurs intermittently. The problem has been waxing and waning. The cough is non-productive. Pertinent negatives include no appetite change, chest pain, fever or weight loss. His symptoms are aggravated by any activity. His symptoms are alleviated by beta-agonist, change in position and cold air. He reports moderate improvement on treatment. Risk factors for lung disease include smoking/tobacco exposure.        Past medical/surgical history, Family history, Social history, Allergies and Medications have been reviewed with the patient today and are updated in [facility] EMR. See below.    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   • Left upper lobe pulmonary nodule 9/12/2019     Past Surgical History:   Procedure Laterality Date   • CHOLECYSTECTOMY     • COLONOSCOPY     • ENDOSCOPY     • PATELLA FRACTURE SURGERY     • TONSILLECTOMY       Family History   Problem Relation Age of Onset   • Hypertension Mother    • No Known Problems Father    • Diabetes Neg Hx    • Cancer Neg Hx    • Stroke Neg Hx      Social History     Tobacco Use   • Smoking status: Current Every Day Smoker     Packs/day: 1.00     Years: 50.00     Pack years: 50.00     Types: Cigarettes   • Smokeless tobacco: Never Used   Substance Use Topics   • Alcohol use: Yes     Comment: occ   • Drug use: No       ALLERGIES:  Patient has no known allergies.    CURRENT MEDS:    Current Outpatient Medications:   •  aspirin 81 MG tablet, aspirin 81 mg  "tablet,delayed release, Disp: , Rfl:   •  raNITIdine (ZANTAC) 150 MG tablet, Take 1 tablet by mouth Daily., Disp: , Rfl:   •  amLODIPine (NORVASC) 5 MG tablet, Take 1 tablet by mouth Daily., Disp: 90 tablet, Rfl: 1  •  citalopram (CeleXA) 20 MG tablet, TAKE ONE TABLET BY MOUTH EVERY DAY, Disp: 90 tablet, Rfl: 1  •  famotidine (PEPCID) 20 MG tablet, Take 1 tablet by mouth 2 (Two) Times a Day., Disp: 60 tablet, Rfl: 5  •  HM ASPIRIN 81 MG chewable tablet, CHEW AND SWALLOW ONE TABLET BY MOUTH EVERY DAY, Disp: 90 tablet, Rfl: 1  •  HYDROcodone-acetaminophen (NORCO) 7.5-325 MG per tablet, Take 1 tablet by mouth Every 12 (Twelve) Hours As Needed for Moderate Pain  or Severe Pain ., Disp: 60 tablet, Rfl: 0  •  metoprolol tartrate (LOPRESSOR) 50 MG tablet, TAKE ONE TABLET BY MOUTH TWICE A DAY, Disp: 180 tablet, Rfl: 1  •  simvastatin (ZOCOR) 20 MG tablet, TAKE ONE TABLET BY MOUTH EVERY DAY, Disp: 90 tablet, Rfl: 1    REVIEW OF SYSTEMS:  Review of Systems   Constitutional: Negative for activity change, appetite change, fatigue, fever, unexpected weight gain and unexpected weight loss.   Respiratory: Positive for cough, sputum production, shortness of breath and wheezing. Negative for hemoptysis.    Cardiovascular: Negative for chest pain.   Gastrointestinal: Negative for abdominal pain.   Genitourinary: Negative for difficulty urinating.   Skin: Negative for rash.   Neurological: Negative for syncope and headache.       PHYSICAL EXAMINATION:  Vital Signs:  /62 (BP Location: Left arm, Patient Position: Sitting, Cuff Size: Adult)   Pulse 80   Ht 190.5 cm (75\")   Wt 101 kg (222 lb)   SpO2 98%   BMI 27.75 kg/m²   Physical Exam   Constitutional: He is oriented to person, place, and time. He appears well-developed and well-nourished. No distress.   HENT:   Head: Normocephalic and atraumatic.   Mouth/Throat: Oropharynx is clear and moist.   Neck: Normal range of motion. Neck supple. No JVD present.   Cardiovascular: " Normal rate, regular rhythm, normal heart sounds and intact distal pulses.   Pulmonary/Chest: Effort normal. No respiratory distress. He has wheezes.   Abdominal: Soft. He exhibits no distension. There is no tenderness.   Musculoskeletal: Normal range of motion. He exhibits no edema.   Neurological: He is alert and oriented to person, place, and time. No cranial nerve deficit.   Skin: Skin is warm and dry. Capillary refill takes less than 2 seconds. No rash noted.   Psychiatric: He has a normal mood and affect. His behavior is normal.   Nursing note and vitals reviewed.      Procedures    ASSESSMENT/ PLAN:  Problem List Items Addressed This Visit        Cardiovascular and Mediastinum    Essential hypertension    Hyperlipidemia LDL goal <100       Respiratory    Panlobular emphysema (CMS/HCC)       Digestive    Gastroesophageal reflux disease without esophagitis    Relevant Medications    raNITIdine (ZANTAC) 150 MG tablet       Nervous and Auditory    Lumbar back pain       Other    Personal history of nicotine dependence      Other Visit Diagnoses     Annual physical exam    -  Primary            Specific Patient Instructions:  MEDICATION Instructions: Encouraged patient to continue routine medicines as prescribed and maintain compliance. Patient instructed to report any adverse side effects or reactions to medicines promptly to the office. Patient instructed to make us aware of any OTC or herbal meds or supplement use.  DIET Recommendations: Patient instructed and provided information on the following nutrition and DIET(s): Calorie restriction for weight reduction and maintenance. Necessity for adequate daily intake of fluids/water.  EXERCISE Instructions: Discussed with patient the need for routine aerobic activity for cardiovascular fitness, 3 times a week for about 30 minutes. Daily exercise for increased fitness and weight reduction goals.    Patient's Body mass index is 27.75 kg/m². BMI is above normal  parameters. Recommendations include: exercise counseling and nutrition counseling.      SMOKING Recommendations: Counseled patient and encouraged them on smoking cessation. Discussed the benefits to all body systems with smoking cessation, including decreased cardiac/lung/stroke/cancer risk.  HEALTH MAINTENANCE:  Counseling provided to patient/family about routine health maintenance and ANNUAL physicals/labs. Counseling on recommended Vaccinations appropriate for age needed.  MISCELLANEOUS Instructions: N/A      Medications or Orders placed this visit:  No orders of the defined types were placed in this encounter.      Medications DISCONTINUED this visit:  There are no discontinued medications.    FOLLOW-UP:  Return in about 6 months (around 6/13/2020).    I discussed the patients findings and my recommendations with patient.  An After Visit Summary (AVS) was printed and given to the patient at discharge.      Jose Leyva MD, FAAFP  12/18/2019

## 2020-03-09 DIAGNOSIS — I10 ESSENTIAL HYPERTENSION: ICD-10-CM

## 2020-03-10 RX ORDER — AMLODIPINE BESYLATE 5 MG/1
TABLET ORAL
Qty: 90 TABLET | Refills: 1 | Status: SHIPPED | OUTPATIENT
Start: 2020-03-10 | End: 2020-09-02

## 2020-04-24 ENCOUNTER — OFFICE VISIT (OUTPATIENT)
Dept: FAMILY MEDICINE CLINIC | Facility: CLINIC | Age: 65
End: 2020-04-24

## 2020-04-24 VITALS — HEART RATE: 74 BPM | SYSTOLIC BLOOD PRESSURE: 123 MMHG | DIASTOLIC BLOOD PRESSURE: 65 MMHG

## 2020-04-24 DIAGNOSIS — I10 ESSENTIAL HYPERTENSION: ICD-10-CM

## 2020-04-24 DIAGNOSIS — M54.50 LUMBAR BACK PAIN: Primary | ICD-10-CM

## 2020-04-24 DIAGNOSIS — E78.5 HYPERLIPIDEMIA LDL GOAL <100: ICD-10-CM

## 2020-04-24 DIAGNOSIS — J43.1 PANLOBULAR EMPHYSEMA (HCC): ICD-10-CM

## 2020-04-24 PROCEDURE — 99212 OFFICE O/P EST SF 10 MIN: CPT | Performed by: FAMILY MEDICINE

## 2020-04-24 RX ORDER — HYDROCODONE BITARTRATE AND ACETAMINOPHEN 7.5; 325 MG/1; MG/1
1 TABLET ORAL EVERY 12 HOURS PRN
Qty: 60 TABLET | Refills: 0 | Status: SHIPPED | OUTPATIENT
Start: 2020-04-24 | End: 2021-06-09

## 2020-04-24 NOTE — PATIENT INSTRUCTIONS
Chronic Back Pain  When back pain lasts longer than 3 months, it is called chronic back pain. The cause of your back pain may not be known. Some common causes include:  · Wear and tear (degenerative disease) of the bones, ligaments, or disks in your back.  · Inflammation and stiffness in your back (arthritis).  People who have chronic back pain often go through certain periods in which the pain is more intense (flare-ups). Many people can learn to manage the pain with home care.  Follow these instructions at home:  Pay attention to any changes in your symptoms. Take these actions to help with your pain:  Activity    · Avoid bending and other activities that make the problem worse.  · Maintain a proper position when standing or sitting:  ? When standing, keep your upper back and neck straight, with your shoulders pulled back. Avoid slouching.  ? When sitting, keep your back straight and relax your shoulders. Do not round your shoulders or pull them backward.  · Do not sit or  one place for long periods of time.  · Take brief periods of rest throughout the day. This will reduce your pain. Resting in a lying or standing position is usually better than sitting to rest.  · When you are resting for longer periods, mix in some mild activity or stretching between periods of rest. This will help to prevent stiffness and pain.  · Get regular exercise. Ask your health care provider what activities are safe for you.  · Do not lift anything that is heavier than 10 lb (4.5 kg). Always use proper lifting technique, which includes:  ? Bending your knees.  ? Keeping the load close to your body.  ? Avoiding twisting.  · Sleep on a firm mattress in a comfortable position. Try lying on your side with your knees slightly bent. If you lie on your back, put a pillow under your knees.  Managing pain  · If directed, apply ice to the painful area. Your health care provider may recommend applying ice during the first 24-48 hours after  a flare-up begins.  ? Put ice in a plastic bag.  ? Place a towel between your skin and the bag.  ? Leave the ice on for 20 minutes, 2-3 times per day.  · If directed, apply heat to the affected area as often as told by your health care provider. Use the heat source that your health care provider recommends, such as a moist heat pack or a heating pad.  ? Place a towel between your skin and the heat source.  ? Leave the heat on for 20-30 minutes.  ? Remove the heat if your skin turns bright red. This is especially important if you are unable to feel pain, heat, or cold. You may have a greater risk of getting burned.  · Try soaking in a warm tub.  · Take over-the-counter and prescription medicines only as told by your health care provider.  · Keep all follow-up visits as told by your health care provider. This is important.  Contact a health care provider if:  · You have pain that is not relieved with rest or medicine.  Get help right away if:  · You have weakness or numbness in one or both of your legs or feet.  · You have trouble controlling your bladder or your bowels.  · You have nausea or vomiting.  · You have pain in your abdomen.  · You have shortness of breath or you faint.  This information is not intended to replace advice given to you by your health care provider. Make sure you discuss any questions you have with your health care provider.  Document Released: 01/25/2006 Document Revised: 12/18/2019 Document Reviewed: 06/27/2018  [employer] Interactive Patient Education © 2020 [employer]

## 2020-04-24 NOTE — PROGRESS NOTES
TELEPHONE VISIT NOTE:    Rupa Verma is a 65 y.o. male who participates in telephone visit today for Hypertension (f/u).     Patient presents for follow-up of long term use of high risk medication (narcotics, sedatives, stimulants or other controlled medications). The patient has read and signed the [facility] Controlled Substance Contract - copy in chart and updated annually. A recent Herb was reviewed and is present in the chart, updated annually and as needed. UDS has been reviewed and is up to date, and performed at least annually and PRN discretion of provider. No aberrant behavioral issues are noted, and no significant side effects/complications are present. The patient is aware of the potential for addiction and dependence of these medications and accepts responsibility for proper med use. Patient is aware of the PRN use of these medications (unless otherwise prescribed), and not to be used routinely.       Hypertension   This is a chronic problem. The current episode started more than 1 year ago. The problem is unchanged. The problem is controlled. Pertinent negatives include no chest pain, headaches, orthopnea, palpitations, peripheral edema, PND or shortness of breath. There are no associated agents to hypertension. The current treatment provides significant improvement. There are no compliance problems.         Past medical/surgical history, Family history, Social history, Allergies and Medications have been reviewed with the patient today and are updated in [facility] EMR. See below.  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   • Left upper lobe pulmonary nodule 9/12/2019     Past Surgical History:   Procedure Laterality Date   • CHOLECYSTECTOMY     • COLONOSCOPY     • ENDOSCOPY     • PATELLA FRACTURE SURGERY     • TONSILLECTOMY       Family History   Problem Relation Age of Onset   • Hypertension Mother    • No Known Problems Father    •  Diabetes Neg Hx    • Cancer Neg Hx    • Stroke Neg Hx      Social History     Tobacco Use   • Smoking status: Current Every Day Smoker     Packs/day: 1.00     Years: 50.00     Pack years: 50.00     Types: Cigarettes   • Smokeless tobacco: Never Used   Substance Use Topics   • Alcohol use: Yes     Comment: occ   • Drug use: No       ALLERGIES:  Patient has no known allergies.    CURRENT MEDS:    Current Outpatient Medications:   •  amLODIPine (NORVASC) 5 MG tablet, TAKE ONE TABLET BY MOUTH EVERY DAY, Disp: 90 tablet, Rfl: 1  •  aspirin 81 MG tablet, aspirin 81 mg tablet,delayed release, Disp: , Rfl:   •  citalopram (CeleXA) 20 MG tablet, TAKE ONE TABLET BY MOUTH EVERY DAY, Disp: 90 tablet, Rfl: 1  •  famotidine (PEPCID) 20 MG tablet, Take 1 tablet by mouth 2 (Two) Times a Day., Disp: 60 tablet, Rfl: 5  •  HM ASPIRIN 81 MG chewable tablet, CHEW AND SWALLOW ONE TABLET BY MOUTH EVERY DAY, Disp: 90 tablet, Rfl: 1  •  HYDROcodone-acetaminophen (Norco) 7.5-325 MG per tablet, Take 1 tablet by mouth Every 12 (Twelve) Hours As Needed for Moderate Pain  or Severe Pain ., Disp: 60 tablet, Rfl: 0  •  metoprolol tartrate (LOPRESSOR) 50 MG tablet, TAKE ONE TABLET BY MOUTH TWICE A DAY, Disp: 180 tablet, Rfl: 1  •  raNITIdine (ZANTAC) 150 MG tablet, Take 1 tablet by mouth Daily., Disp: , Rfl:   •  simvastatin (ZOCOR) 20 MG tablet, TAKE ONE TABLET BY MOUTH EVERY DAY, Disp: 90 tablet, Rfl: 1    REVIEW OF SYSTEMS:  Review of Systems   Constitutional: Negative for activity change, appetite change, fatigue, fever, unexpected weight gain and unexpected weight loss.   Respiratory: Negative for shortness of breath.    Cardiovascular: Negative for chest pain, palpitations, orthopnea and PND.   Gastrointestinal: Negative for abdominal pain.   Genitourinary: Negative for difficulty urinating.   Skin: Negative for rash.   Neurological: Negative for syncope and headache.       PHYSICAL EXAMINATION:  Vital Signs:  Not able to obtain in office,  but any listed is from the patient's measurement at home.  /65   Pulse 74   Seems to be in no acute distress, with normal voice and not dyspneic. Speaks in normal sentences with normal affect and mood, and normal thought processes.     ASSESSMENT/ PLAN:  Problem List Items Addressed This Visit        Cardiovascular and Mediastinum    Essential hypertension    Hyperlipidemia LDL goal <100       Respiratory    Panlobular emphysema (CMS/HCC)       Nervous and Auditory    Lumbar back pain - Primary    Relevant Medications    HYDROcodone-acetaminophen (Norco) 7.5-325 MG per tablet          Specific Patient Instructions:  MEDICATION Instructions:  Encouraged patient to continue routine medicines as prescribed and maintain compliance. Patient instructed to report any adverse side effects or reactions to medicines promptly to the office. Patient instructed to make us aware of any OTC or herbal meds or supplement use.  DIET Recommendations:  Patient instructed and provided information on the following nutrition and diet recommendations: Calorie restriction for weight reduction and maintenance. Necessity for adequate daily intake of fluids/water. Also, diet information available from AVS as necessary.   EXERCISE Instructions:  Discussed with patient the need for routine aerobic activity for cardiovascular fitness, 3 times a week for about 30 minutes. Daily exercise for increased fitness and weight reduction goals.  SMOKING Recommendations:  Counseled patient and encouraged them on smoking and tobacco cessation, if applicable. Discussed the benefits to all body systems with smoking/tobacco cessation, including decreased cardiac/lung/stroke/cancer risk. Encouraged no vaping use.  HEALTH MAINTENANCE:  Counseling provided to patient/family about routine health maintenance and ANNUAL physicals/labs. Counseling on recommended Vaccinations appropriate for age needed.        Medications or Orders placed this visit:  No orders  of the defined types were placed in this encounter.      Medications DISCONTINUED this visit:  Medications Discontinued During This Encounter   Medication Reason   • HYDROcodone-acetaminophen (NORCO) 7.5-325 MG per tablet Reorder       FOLLOW-UP:  Return in about 3 months (around 7/24/2020), or if symptoms worsen or fail to improve, for Recheck, Next scheduled follow up.    I discussed the patients findings and my recommendations with patient.  An After Visit Summary (AVS) was made available to the patient at discharge through [facility].        Jose Leyva MD, FAAFP  4/24/2020    This visit has been rescheduled as a phone visit to comply with patient safety concerns in accordance with CDC recommendations. Total time of discussion was 12 minutes.

## 2020-04-26 NOTE — PROGRESS NOTES
Subjective   Rupa Verma is a 65 y.o. male.     Background: pt with hx mild copd and roxy nodule 2.5 cm identified 2012, tuberculosis identified on bronchoscopy 7/2012.  New nodule identified 12/2018, some additional small nodules present on follow up 10/2019.    hx smoking.    This visit has been rescheduled as a phone visit to comply with patient safety concerns in accordance with CDC recommendations. Total time of discussion was 6 minutes.     Chief Complaint   Patient presents with   • Lung Nodule        History of Present Illness   He follows up via telephone call today due to pandemic situation.  He was known to have tuberculosis about 8 years ago.  He was treated for that.  We found that a bronchoscopy.  No other people were infected.  He has had nodules over the years and his last CT of the chest done in October of last year new 7 mm nodule was identified.  Six-month follow-up from then was recommended per radiology, which is around now.  Elective imaging has been put on hold and we discussed that today.    Medical/Family/Social History   has a past medical history of Back pain, History of primary tuberculosis (9/12/2019), and Left upper lobe pulmonary nodule (9/12/2019).   has a past surgical history that includes Tonsillectomy; Cholecystectomy; Colonoscopy; Esophagogastroduodenoscopy; and Patella fracture surgery.  family history includes Hypertension in his mother; No Known Problems in his father.   reports that he has been smoking cigarettes. He has a 50.00 pack-year smoking history. He has never used smokeless tobacco. He reports that he drinks alcohol. He reports that he does not use drugs.  No Known Allergies  Medications    Current Outpatient Medications:   •  amLODIPine (NORVASC) 5 MG tablet, TAKE ONE TABLET BY MOUTH EVERY DAY, Disp: 90 tablet, Rfl: 1  •  aspirin 81 MG tablet, aspirin 81 mg tablet,delayed release, Disp: , Rfl:   •  citalopram (CeleXA) 20 MG tablet, TAKE ONE TABLET BY MOUTH EVERY  DAY, Disp: 90 tablet, Rfl: 1  •  famotidine (PEPCID) 20 MG tablet, Take 1 tablet by mouth 2 (Two) Times a Day., Disp: 60 tablet, Rfl: 5  •  HM ASPIRIN 81 MG chewable tablet, CHEW AND SWALLOW ONE TABLET BY MOUTH EVERY DAY, Disp: 90 tablet, Rfl: 1  •  HYDROcodone-acetaminophen (Norco) 7.5-325 MG per tablet, Take 1 tablet by mouth Every 12 (Twelve) Hours As Needed for Moderate Pain  or Severe Pain ., Disp: 60 tablet, Rfl: 0  •  metoprolol tartrate (LOPRESSOR) 50 MG tablet, TAKE ONE TABLET BY MOUTH TWICE A DAY, Disp: 180 tablet, Rfl: 1  •  raNITIdine (ZANTAC) 150 MG tablet, Take 1 tablet by mouth Daily., Disp: , Rfl:   •  simvastatin (ZOCOR) 20 MG tablet, TAKE ONE TABLET BY MOUTH EVERY DAY, Disp: 90 tablet, Rfl: 1       EXAMINATION: CT CHEST WO CONTRAST-   10/8/2019 12:51 PM CDT  HISTORY: follow up nodule, compare with 2018 and also 2012; hx  tuberculosis; R91.1-Solitary pulmonary nodule; Z86.11-Personal history  of tuberculosis  Noncontrast chest CT imaging compared with 12/04/2018 low dose CT exam.  Normal heart size.  Coronary artery calcification.  Nonenlarged mediastinal lymph nodes.  No soft tissue mass within the mediastinum.  No pleural effusion.  No abnormality is seen within the upper abdomen.  Chronic lung changes with apical scarring.  Left upper lobe nodule has a scarlike appearance and is unchanged  measuring approximately 8 mm in greatest dimension  New 6-7 mm nodule within the base of the right upper lobe on series 3  image 78.  Summary:  1. 10 month CT follow-up shows an unchanged appearance of the left upper  lobe nodule area of interest.  2. There is a new 6 to 7 mm nodule within the right upper lobe which  will require follow-up.  3. 6 month chest CT follow-up recommended at this time.  This report was finalized on 10/08/2019 13:31 by Dr. Ricardo Cross MD.         Assessment/Plan   Problem List Items Addressed This Visit        Pulmonary Problems    Left upper lobe pulmonary nodule - Primary        "Other    Gastroesophageal reflux disease without esophagitis    History of primary tuberculosis    Personal history of nicotine dependence          Patient has a symptomatic new lung nodule in addition to old stable nodules and a prior history of tuberculosis.  This will require ongoing follow-up and will go ahead and schedule his chest CT.  He did relate symptoms of night sweats which have been going on for almost a year.  He has got attention for that through primary care and was told he was going to the \"change of life\".  However he does not have cachexia cough or hemoptysis now so I think it is very unlikely that he has tuberculosis.  With a smoking history he is also at risk for lung cancer we need to consider that also.  We will schedule the CT and will schedule a follow-up for a few months down the road.  The CT scan hopefully can get done within the next couple of weeks as radiology elective procedures are restarting.       Electronically signed by Rj Fritz MD, 4/27/2020, 09:10    "

## 2020-04-27 ENCOUNTER — OFFICE VISIT (OUTPATIENT)
Dept: PULMONOLOGY | Facility: CLINIC | Age: 65
End: 2020-04-27

## 2020-04-27 DIAGNOSIS — R91.1 LEFT UPPER LOBE PULMONARY NODULE: Primary | ICD-10-CM

## 2020-04-27 DIAGNOSIS — Z87.891 PERSONAL HISTORY OF NICOTINE DEPENDENCE: ICD-10-CM

## 2020-04-27 DIAGNOSIS — Z86.11: ICD-10-CM

## 2020-04-27 DIAGNOSIS — K21.9 GASTROESOPHAGEAL REFLUX DISEASE WITHOUT ESOPHAGITIS: ICD-10-CM

## 2020-04-27 PROCEDURE — 99441 PR PHYS/QHP TELEPHONE EVALUATION 5-10 MIN: CPT | Performed by: INTERNAL MEDICINE

## 2020-05-07 DIAGNOSIS — E78.5 HYPERLIPIDEMIA LDL GOAL <100: ICD-10-CM

## 2020-05-07 DIAGNOSIS — F32.89 OTHER DEPRESSION: ICD-10-CM

## 2020-05-07 DIAGNOSIS — I10 ESSENTIAL HYPERTENSION: ICD-10-CM

## 2020-05-14 RX ORDER — METOPROLOL TARTRATE 50 MG/1
TABLET, FILM COATED ORAL
Qty: 180 TABLET | Refills: 1 | Status: SHIPPED | OUTPATIENT
Start: 2020-05-14 | End: 2020-11-02

## 2020-05-14 RX ORDER — SIMVASTATIN 20 MG
TABLET ORAL
Qty: 90 TABLET | Refills: 1 | Status: SHIPPED | OUTPATIENT
Start: 2020-05-14 | End: 2020-11-02

## 2020-05-14 RX ORDER — CITALOPRAM 20 MG/1
TABLET ORAL
Qty: 90 TABLET | Refills: 1 | Status: SHIPPED | OUTPATIENT
Start: 2020-05-14 | End: 2020-11-02

## 2020-05-14 NOTE — TELEPHONE ENCOUNTER
PT CALLED REQUESTING A REFILL ON   - citalopram (CeleXA) 20 MG tablet  - simvastatin (ZOCOR) 20 MG tablet  - metoprolol tartrate (LOPRESSOR) 50 MG tablet    PHARMACY: [facility] [address] [phone] [facility] [phone]   [phone]    PT'S CALLBACK NUMBER: [phone]

## 2020-06-10 RX ORDER — FAMOTIDINE 20 MG/1
TABLET, FILM COATED ORAL
Qty: 180 TABLET | Refills: 0 | Status: SHIPPED | OUTPATIENT
Start: 2020-06-10 | End: 2020-09-03

## 2020-09-01 DIAGNOSIS — I10 ESSENTIAL HYPERTENSION: ICD-10-CM

## 2020-09-02 RX ORDER — AMLODIPINE BESYLATE 5 MG/1
TABLET ORAL
Qty: 90 TABLET | Refills: 1 | Status: SHIPPED | OUTPATIENT
Start: 2020-09-02 | End: 2021-03-03

## 2020-09-03 RX ORDER — FAMOTIDINE 20 MG/1
TABLET, FILM COATED ORAL
Qty: 180 TABLET | Refills: 0 | Status: SHIPPED | OUTPATIENT
Start: 2020-09-03 | End: 2020-12-16 | Stop reason: ALTCHOICE

## 2020-10-29 ENCOUNTER — APPOINTMENT (OUTPATIENT)
Dept: CT IMAGING | Facility: HOSPITAL | Age: 65
End: 2020-10-29

## 2020-11-02 DIAGNOSIS — I10 ESSENTIAL HYPERTENSION: ICD-10-CM

## 2020-11-02 DIAGNOSIS — E78.5 HYPERLIPIDEMIA LDL GOAL <100: ICD-10-CM

## 2020-11-02 DIAGNOSIS — F32.89 OTHER DEPRESSION: ICD-10-CM

## 2020-11-02 RX ORDER — METOPROLOL TARTRATE 50 MG/1
TABLET, FILM COATED ORAL
Qty: 180 TABLET | Refills: 0 | Status: SHIPPED | OUTPATIENT
Start: 2020-11-02 | End: 2021-02-03

## 2020-11-02 RX ORDER — SIMVASTATIN 20 MG
TABLET ORAL
Qty: 90 TABLET | Refills: 0 | Status: SHIPPED | OUTPATIENT
Start: 2020-11-02 | End: 2021-02-03

## 2020-11-02 RX ORDER — CITALOPRAM 20 MG/1
TABLET ORAL
Qty: 90 TABLET | Refills: 0 | Status: SHIPPED | OUTPATIENT
Start: 2020-11-02 | End: 2021-02-03

## 2020-11-02 NOTE — TELEPHONE ENCOUNTER
Patient last seen 4/24/20 by Dr Leyva. Patient was due to be seen around 7/24/20 per last OV note. Patient has annual scheduled with Dr Leyva 12/16/20.

## 2020-11-16 ENCOUNTER — HOSPITAL ENCOUNTER (OUTPATIENT)
Dept: CT IMAGING | Facility: HOSPITAL | Age: 65
Discharge: HOME | End: 2020-11-16
Admitting: INTERNAL MEDICINE

## 2020-11-16 DIAGNOSIS — R91.1 LEFT UPPER LOBE PULMONARY NODULE: ICD-10-CM

## 2020-11-16 PROCEDURE — 71250 CT THORAX DX C-: CPT

## 2020-11-17 NOTE — PROGRESS NOTES
Spoke with patient and relayed test results. Patient voiced understanding. Reminded patient to keep his appointment with Dr. Fritz on 12/1/20.

## 2020-12-14 ENCOUNTER — CLINICAL SUPPORT (OUTPATIENT)
Dept: FAMILY MEDICINE CLINIC | Facility: CLINIC | Age: 65
End: 2020-12-14

## 2020-12-14 DIAGNOSIS — E78.5 HYPERLIPIDEMIA LDL GOAL <100: ICD-10-CM

## 2020-12-14 DIAGNOSIS — Z00.00 ANNUAL PHYSICAL EXAM: ICD-10-CM

## 2020-12-14 DIAGNOSIS — I10 ESSENTIAL HYPERTENSION: Primary | ICD-10-CM

## 2020-12-14 DIAGNOSIS — Z12.5 SPECIAL SCREENING FOR MALIGNANT NEOPLASM OF PROSTATE: ICD-10-CM

## 2020-12-15 LAB
ALBUMIN SERPL-MCNC: 4.4 G/DL (ref 3.8–4.8)
ALBUMIN/GLOB SERPL: 1.6 {RATIO} (ref 1.2–2.2)
ALP SERPL-CCNC: 89 IU/L (ref 39–117)
ALT SERPL-CCNC: 40 IU/L (ref 0–44)
AST SERPL-CCNC: 43 IU/L (ref 0–40)
BASOPHILS # BLD AUTO: 0.1 X10E3/UL (ref 0–0.2)
BASOPHILS NFR BLD AUTO: 1 %
BILIRUB SERPL-MCNC: 0.4 MG/DL (ref 0–1.2)
BUN SERPL-MCNC: 9 MG/DL (ref 8–27)
BUN/CREAT SERPL: 10 (ref 10–24)
CALCIUM SERPL-MCNC: 9.4 MG/DL (ref 8.6–10.2)
CHLORIDE SERPL-SCNC: 101 MMOL/L (ref 96–106)
CHOLEST SERPL-MCNC: 158 MG/DL (ref 100–199)
CO2 SERPL-SCNC: 22 MMOL/L (ref 20–29)
CREAT SERPL-MCNC: 0.91 MG/DL (ref 0.76–1.27)
EOSINOPHIL # BLD AUTO: 0.3 X10E3/UL (ref 0–0.4)
EOSINOPHIL NFR BLD AUTO: 4 %
ERYTHROCYTE [DISTWIDTH] IN BLOOD BY AUTOMATED COUNT: 12.7 % (ref 11.6–15.4)
GLOBULIN SER CALC-MCNC: 2.7 G/DL (ref 1.5–4.5)
GLUCOSE SERPL-MCNC: 125 MG/DL (ref 65–99)
HCT VFR BLD AUTO: 48.8 % (ref 37.5–51)
HCV AB S/CO SERPL IA: <0.1 S/CO RATIO (ref 0–0.9)
HDLC SERPL-MCNC: 41 MG/DL
HGB BLD-MCNC: 16.3 G/DL (ref 13–17.7)
IMM GRANULOCYTES # BLD AUTO: 0.1 X10E3/UL (ref 0–0.1)
IMM GRANULOCYTES NFR BLD AUTO: 1 %
LDLC SERPL CALC-MCNC: 84 MG/DL (ref 0–99)
LYMPHOCYTES # BLD AUTO: 1.1 X10E3/UL (ref 0.7–3.1)
LYMPHOCYTES NFR BLD AUTO: 13 %
MCH RBC QN AUTO: 32.1 PG (ref 26.6–33)
MCHC RBC AUTO-ENTMCNC: 33.4 G/DL (ref 31.5–35.7)
MCV RBC AUTO: 96 FL (ref 79–97)
MONOCYTES # BLD AUTO: 0.7 X10E3/UL (ref 0.1–0.9)
MONOCYTES NFR BLD AUTO: 8 %
NEUTROPHILS # BLD AUTO: 6.5 X10E3/UL (ref 1.4–7)
NEUTROPHILS NFR BLD AUTO: 73 %
PLATELET # BLD AUTO: 256 X10E3/UL (ref 150–450)
POTASSIUM SERPL-SCNC: 4.8 MMOL/L (ref 3.5–5.2)
PROT SERPL-MCNC: 7.1 G/DL (ref 6–8.5)
PSA SERPL-MCNC: 0.2 NG/ML (ref 0–4)
RBC # BLD AUTO: 5.07 X10E6/UL (ref 4.14–5.8)
SODIUM SERPL-SCNC: 139 MMOL/L (ref 134–144)
T4 SERPL-MCNC: 7 UG/DL (ref 4.5–12)
TRIGL SERPL-MCNC: 197 MG/DL (ref 0–149)
TSH SERPL DL<=0.005 MIU/L-ACNC: 1.04 UIU/ML (ref 0.45–4.5)
VLDLC SERPL CALC-MCNC: 33 MG/DL (ref 5–40)
WBC # BLD AUTO: 8.8 X10E3/UL (ref 3.4–10.8)

## 2020-12-16 ENCOUNTER — OFFICE VISIT (OUTPATIENT)
Dept: FAMILY MEDICINE CLINIC | Facility: CLINIC | Age: 65
End: 2020-12-16

## 2020-12-16 VITALS
HEIGHT: 75 IN | WEIGHT: 228.6 LBS | DIASTOLIC BLOOD PRESSURE: 68 MMHG | SYSTOLIC BLOOD PRESSURE: 156 MMHG | OXYGEN SATURATION: 97 % | HEART RATE: 71 BPM | TEMPERATURE: 97.6 F | BODY MASS INDEX: 28.42 KG/M2

## 2020-12-16 DIAGNOSIS — I10 ESSENTIAL HYPERTENSION: ICD-10-CM

## 2020-12-16 DIAGNOSIS — K21.9 GASTROESOPHAGEAL REFLUX DISEASE WITHOUT ESOPHAGITIS: ICD-10-CM

## 2020-12-16 DIAGNOSIS — Z87.891 PERSONAL HISTORY OF NICOTINE DEPENDENCE: ICD-10-CM

## 2020-12-16 DIAGNOSIS — J43.1 PANLOBULAR EMPHYSEMA (HCC): ICD-10-CM

## 2020-12-16 DIAGNOSIS — M54.50 LUMBAR BACK PAIN: ICD-10-CM

## 2020-12-16 DIAGNOSIS — R91.1 LEFT UPPER LOBE PULMONARY NODULE: ICD-10-CM

## 2020-12-16 DIAGNOSIS — E78.5 HYPERLIPIDEMIA LDL GOAL <100: ICD-10-CM

## 2020-12-16 DIAGNOSIS — Z00.00 ANNUAL PHYSICAL EXAM: Primary | ICD-10-CM

## 2020-12-16 DIAGNOSIS — M62.838 CERVICAL PARASPINAL MUSCLE SPASM: ICD-10-CM

## 2020-12-16 PROCEDURE — 99396 PREV VISIT EST AGE 40-64: CPT | Performed by: FAMILY MEDICINE

## 2020-12-16 RX ORDER — PANTOPRAZOLE SODIUM 20 MG/1
20 TABLET, DELAYED RELEASE ORAL DAILY
COMMUNITY
Start: 2020-11-02 | End: 2021-01-06 | Stop reason: SDUPTHER

## 2020-12-16 RX ORDER — CYCLOBENZAPRINE HCL 10 MG
10 TABLET ORAL 3 TIMES DAILY PRN
Qty: 20 TABLET | Refills: 0 | Status: SHIPPED | OUTPATIENT
Start: 2020-12-16 | End: 2021-05-10

## 2020-12-16 NOTE — PROGRESS NOTES
OFFICE VISIT NOTE:    Rupa Verma is a 65 y.o. male who presents today for Annual Exam (Patient had labs completed) and Shoulder Pain (Right shoulder).     Labs reviewed - , and glucose 125 - will give diet information. Due for back injection tomorrow AM with Dr Larsen.     Does have some right shoulder and neck pain- essentially was from right sided neck spasms.     Back Pain  This is a chronic problem. The current episode started more than 1 year ago. The problem occurs constantly. The problem is unchanged. The pain is present in the lumbar spine and sacro-iliac. The quality of the pain is described as aching and cramping. The pain does not radiate. The pain is severe. The pain is the same all the time. The symptoms are aggravated by bending, standing and twisting. Stiffness is present in the morning. Pertinent negatives include no abdominal pain, bladder incontinence, bowel incontinence, chest pain, fever or weight loss. He has tried analgesics, bed rest and home exercises for the symptoms. The treatment provided moderate relief.   Hyperlipidemia  This is a chronic problem. The current episode started more than 1 year ago. The problem is controlled. Exacerbating diseases include diabetes. Pertinent negatives include no chest pain or shortness of breath. Current antihyperlipidemic treatment includes diet change, exercise and statins. The current treatment provides significant improvement of lipids. There are no compliance problems.    Blood Sugar Problem  Pertinent negatives include no abdominal pain, chest pain, fatigue, fever or rash.        Past medical/surgical history, Family history, Social history, Allergies and Medications have been reviewed with the patient today and are updated in [facility] EMR. See below.  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   • Left upper lobe pulmonary nodule 9/12/2019     Past Surgical History:   Procedure Laterality  Date   • CHOLECYSTECTOMY     • COLONOSCOPY     • ENDOSCOPY     • PATELLA FRACTURE SURGERY     • TONSILLECTOMY       Family History   Problem Relation Age of Onset   • Hypertension Mother    • No Known Problems Father    • Diabetes Neg Hx    • Cancer Neg Hx    • Stroke Neg Hx      Social History     Tobacco Use   • Smoking status: Current Every Day Smoker     Packs/day: 1.00     Years: 50.00     Pack years: 50.00     Types: Cigarettes   • Smokeless tobacco: Never Used   Substance Use Topics   • Alcohol use: Yes     Comment: occ   • Drug use: No       ALLERGIES:  Patient has no known allergies.    CURRENT MEDS:    Current Outpatient Medications:   •  amLODIPine (NORVASC) 5 MG tablet, TAKE ONE TABLET BY MOUTH EVERY DAY, Disp: 90 tablet, Rfl: 1  •  aspirin 81 MG tablet, aspirin 81 mg tablet,delayed release, Disp: , Rfl:   •  citalopram (CeleXA) 20 MG tablet, TAKE ONE TABLET BY MOUTH EVERY DAY, Disp: 90 tablet, Rfl: 0  •  HYDROcodone-acetaminophen (Norco) 7.5-325 MG per tablet, Take 1 tablet by mouth Every 12 (Twelve) Hours As Needed for Moderate Pain  or Severe Pain ., Disp: 60 tablet, Rfl: 0  •  metoprolol tartrate (LOPRESSOR) 50 MG tablet, TAKE ONE TABLET BY MOUTH TWICE A DAY, Disp: 180 tablet, Rfl: 0  •  pantoprazole (PROTONIX) 20 MG EC tablet, Take 20 mg by mouth Daily., Disp: , Rfl:   •  simvastatin (ZOCOR) 20 MG tablet, TAKE ONE TABLET BY MOUTH EVERY DAY, Disp: 90 tablet, Rfl: 0  •  cyclobenzaprine (FLEXERIL) 10 MG tablet, Take 1 tablet by mouth 3 (Three) Times a Day As Needed for Muscle Spasms., Disp: 20 tablet, Rfl: 0    REVIEW OF SYSTEMS:  Review of Systems   Constitutional: Negative for activity change, appetite change, fatigue, fever, unexpected weight gain and unexpected weight loss.   Respiratory: Negative for shortness of breath.    Cardiovascular: Negative for chest pain.   Gastrointestinal: Negative for abdominal pain and bowel incontinence.   Genitourinary: Negative for difficulty urinating and urinary  "incontinence.   Musculoskeletal: Positive for back pain.   Skin: Negative for rash.   Neurological: Negative for syncope and headache.       PHYSICAL EXAMINATION:  Vital Signs:  /68 (BP Location: Right arm, Patient Position: Sitting, Cuff Size: Large Adult)   Pulse 71   Temp 97.6 °F (36.4 °C) (Infrared)   Ht 190.5 cm (75\")   Wt 104 kg (228 lb 9.6 oz)   SpO2 97%   BMI 28.57 kg/m²   Vitals:    12/16/20 0857   Patient Position: Sitting       Physical Exam  Vitals signs and nursing note reviewed.   Constitutional:       General: He is not in acute distress.     Appearance: He is well-developed.   HENT:      Head: Normocephalic and atraumatic.      Mouth/Throat:      Mouth: Mucous membranes are moist.      Pharynx: Oropharynx is clear.   Eyes:      Extraocular Movements: Extraocular movements intact.      Pupils: Pupils are equal, round, and reactive to light.   Neck:      Musculoskeletal: Normal range of motion and neck supple.      Vascular: No JVD.   Cardiovascular:      Rate and Rhythm: Normal rate and regular rhythm.      Pulses: Normal pulses.      Heart sounds: Normal heart sounds.   Pulmonary:      Effort: Pulmonary effort is normal. No respiratory distress.      Breath sounds: Normal breath sounds.   Abdominal:      General: Bowel sounds are normal. There is no distension.      Palpations: Abdomen is soft.      Tenderness: There is no abdominal tenderness.   Musculoskeletal: Normal range of motion.   Skin:     General: Skin is warm and dry.      Capillary Refill: Capillary refill takes less than 2 seconds.      Findings: No rash.   Neurological:      General: No focal deficit present.      Mental Status: He is alert and oriented to person, place, and time.      Cranial Nerves: No cranial nerve deficit.   Psychiatric:         Mood and Affect: Mood normal.         Behavior: Behavior normal.         Procedures    ASSESSMENT/ PLAN:  Problem List Items Addressed This Visit        Cardiovascular and " Mediastinum    Essential hypertension    Hyperlipidemia LDL goal <100       Respiratory    Left upper lobe pulmonary nodule    Panlobular emphysema (CMS/HCC)       Digestive    Gastroesophageal reflux disease without esophagitis    Relevant Medications    pantoprazole (PROTONIX) 20 MG EC tablet       Nervous and Auditory    Lumbar back pain       Musculoskeletal and Integument    Cervical paraspinal muscle spasm    Relevant Medications    cyclobenzaprine (FLEXERIL) 10 MG tablet       Other    Personal history of nicotine dependence    Annual physical exam - Primary            Specific Patient Instructions:  MEDICATION Instructions: Encouraged patient to continue routine medicines as prescribed and maintain compliance. Patient instructed to report any adverse side effects or reactions to medicines promptly to the office. Patient instructed to make us aware of any OTC or herbal meds or supplement use.    DIET Recommendations: Patient instructed and provided information on the following nutrition and diet recommendations: Calorie restriction for weight reduction and maintenance. Necessity for adequate daily intake of fluids/water. Also, diet information provided in AVS for specifics.    EXERCISE Instructions: Discussed with patient the need for routine aerobic activity for cardiovascular fitness, 3 times a week for about 30 minutes. Daily exercise for increased fitness and weight reduction goals.    SMOKING Recommendations: Counseled patient and encouraged them on smoking and tobacco cessation if applicable. Discussed the benefits to all body systems with smoking/tobacco cessation, including decreased cardiac/lung/stroke/cancer risk. Encouraged no vaping use.    HEALTH MAINTENANCE:  Counseling provided to patient/family about routine health maintenance and ANNUAL physicals/labs. Counseling on recommended Vaccinations appropriate for age needed.        Patient's Body mass index is 28.57 kg/m². BMI is above normal  parameters. Recommendations include: exercise counseling and nutrition counseling.      Medications or Orders placed this visit:  No orders of the defined types were placed in this encounter.      Medications DISCONTINUED this visit:  Medications Discontinued During This Encounter   Medication Reason   • famotidine (PEPCID) 20 MG tablet Alternate therapy   • raNITIdine (ZANTAC) 150 MG tablet Alternate therapy   • HM ASPIRIN 81 MG chewable tablet Duplicate order       FOLLOW-UP:  Return in about 6 months (around 6/16/2021) for Recheck, Next scheduled follow up.    I discussed the patients findings and my recommendations with patient.  An After Visit Summary (AVS) was printed and given to the patient at discharge.        Jose Leyva MD, FAAFP  12/16/2020

## 2020-12-16 NOTE — PATIENT INSTRUCTIONS
Suspect Essential HTN.Good BP control is encouraged with Goal BP based on [facility] 8 guidelines 2014 <140/90 for patients with known cardiac disease and diabetes. (MARINA. 2014:322 (5):507-520. doi:[id number]/marina.[email or web address]): general population <60 yr old goal BP <140/90 and for those >60 <150/90.  For patients of all ages with Diabetes, CKD, Known CAD <140/90. Recommended to the patient to obtain electronic home BP machine with upper arm blood pressure cuff and to check regularly as instructed.  Keep BP log and bring to subsequent visits. Stable, at goal.  a. LABS: routine for hypertension recommended and ordered if necessary.  b. Recommend if you do not have a home BP machine to obtain an electronic machine with arm blood pressure cuff.      c. Monitor BP over the next week and keep log to bring back to office. Discussed medication therapy however pt wants to try to control with diet exercise. .  Your provider  has recommended self-monitoring of your blood pressure.  If you do not have a blood pressure cuff you may purchase one from the local pharmacy.  You may ask the pharmacist which brand and model they recommend.  Obtain your blood pressure measurement at least 2x per week.  You should also check your blood pressure if you experience any symptoms of blurred visit, dizziness or headache.  Please record all blood pressure measurements and bring them to next office visit.  If you have any questions about the accuracy of your blood pressure machine please bring it in to the office and our staff will be happy to check accuracy.   d. Encouraged to eat a low sodium heart healthy diet  e. Offered handout on HTN educational topics.  These were provided if patient requested these today.  f. MEDS: as listed in today's visit.  g. Risks/benefits of current and new medications discussed with the patient and or family today.  The patient/family are aware and accept that if there any side effects they should call or return to clinic  as soon as possible.  Appropriate F/U discussed for topics addressed today. All questions were answered to the  satisfactory state of patient/family.  Should symptoms fail to improve or worsen they agree to call or return to clinic or to go to the ER. Education handouts were offered on any new Rx if requested.  Discussed the importance of following up with any needed screening tests/labs/specialist appointments and any requested follow-up recommended by me today.  Importance of maintaining follow-up discussed and patient accepts that missed appointments can delay diagnosis and potentially lead to worsening of conditions.      Fat and Cholesterol Restricted Eating Plan  Getting too much fat and cholesterol in your diet may cause health problems. Choosing the right foods helps keep your fat and cholesterol at normal levels. This can keep you from getting certain diseases.  Your doctor may recommend an eating plan that includes:  · Total fat: ______% or less of total calories a day.  · Saturated fat: ______% or less of total calories a day.  · Cholesterol: less than _________mg a day.  · Fiber: ______g a day.  What are tips for following this plan?  Meal planning  · At meals, divide your plate into four equal parts:  ? Fill one-half of your plate with vegetables and green salads.  ? Fill one-fourth of your plate with whole grains.  ? Fill one-fourth of your plate with low-fat (lean) protein foods.  · Eat fish that is high in omega-3 fats at least two times a week. This includes mackerel, tuna, sardines, and salmon.  · Eat foods that are high in fiber, such as whole grains, beans, apples, broccoli, carrots, peas, and barley.  General tips    · Work with your doctor to lose weight if you need to.  · Avoid:  ? Foods with added sugar.  ? Fried foods.  ? Foods with partially hydrogenated oils.  · Limit alcohol intake to no more than 1 drink a day for nonpregnant women and 2 drinks a day for men. One drink equals 12 oz of  "beer, 5 oz of wine, or 1½ oz of hard liquor.  Reading food labels  · Check food labels for:  ? Trans fats.  ? Partially hydrogenated oils.  ? Saturated fat (g) in each serving.  ? Cholesterol (mg) in each serving.  ? Fiber (g) in each serving.  · Choose foods with healthy fats, such as:  ? Monounsaturated fats.  ? Polyunsaturated fats.  ? Omega-3 fats.  · Choose grain products that have whole grains. Look for the word \"whole\" as the first word in the ingredient list.  Cooking  · Cook foods using low-fat methods. These include baking, boiling, grilling, and broiling.  · Eat more home-cooked foods. Eat at restaurants and buffets less often.  · Avoid cooking using saturated fats, such as butter, cream, palm oil, palm kernel oil, and coconut oil.  Recommended foods    Fruits  · All fresh, canned (in natural juice), or frozen fruits.  Vegetables  · Fresh or frozen vegetables (raw, steamed, roasted, or grilled). Green salads.  Grains  · Whole grains, such as whole wheat or whole grain breads, crackers, cereals, and pasta. Unsweetened oatmeal, bulgur, barley, quinoa, or brown rice. Corn or whole wheat flour tortillas.  Meats and other protein foods  · Ground beef (85% or leaner), grass-fed beef, or beef trimmed of fat. Skinless chicken or turkey. Ground chicken or turkey. Pork trimmed of fat. All fish and seafood. Egg whites. Dried beans, peas, or lentils. Unsalted nuts or seeds. Unsalted canned beans. Nut butters without added sugar or oil.  Dairy  · Low-fat or nonfat dairy products, such as skim or 1% milk, 2% or reduced-fat cheeses, low-fat and fat-free ricotta or cottage cheese, or plain low-fat and nonfat yogurt.  Fats and oils  · Tub margarine without trans fats. Light or reduced-fat mayonnaise and salad dressings. Avocado. Olive, canola, sesame, or safflower oils.  The items listed above may not be a complete list of foods and beverages you can eat. Contact a dietitian for more information.  Foods to " avoid  Fruits  · Canned fruit in heavy syrup. Fruit in cream or butter sauce. Fried fruit.  Vegetables  · Vegetables cooked in cheese, cream, or butter sauce. Fried vegetables.  Grains  · White bread. White pasta. White rice. Cornbread. Bagels, pastries, and croissants. Crackers and snack foods that contain trans fat and hydrogenated oils.  Meats and other protein foods  · Fatty cuts of meat. Ribs, chicken wings, rojas, sausage, bologna, salami, chitterlings, fatback, hot dogs, bratwurst, and packaged lunch meats. Liver and organ meats. Whole eggs and egg yolks. Chicken and turkey with skin. Fried meat.  Dairy  · Whole or 2% milk, cream, half-and-half, and cream cheese. Whole milk cheeses. Whole-fat or sweetened yogurt. Full-fat cheeses. Nondairy creamers and whipped toppings. Processed cheese, cheese spreads, and cheese curds.  Beverages  · Alcohol. Sugar-sweetened drinks such as sodas, lemonade, and fruit drinks.  Fats and oils  · Butter, stick margarine, lard, shortening, ghee, or rojas fat. Coconut, palm kernel, and palm oils.  Sweets and desserts  · Corn syrup, sugars, honey, and molasses. Candy. Jam and jelly. Syrup. Sweetened cereals. Cookies, pies, cakes, donuts, muffins, and ice cream.  The items listed above may not be a complete list of foods and beverages you should avoid. Contact a dietitian for more information.  Summary  · Choosing the right foods helps keep your fat and cholesterol at normal levels. This can keep you from getting certain diseases.  · At meals, fill one-half of your plate with vegetables and green salads.  · Eat high-fiber foods, like whole grains, beans, apples, carrots, peas, and barley.  · Limit added sugar, saturated fats, alcohol, and fried foods.  This information is not intended to replace advice given to you by your health care provider. Make sure you discuss any questions you have with your health care provider.  Document Revised: 08/21/2019 Document Reviewed:  09/04/2018  [employer] Patient Education © 2020 [employer]      Chronic Obstructive Pulmonary Disease    Chronic obstructive pulmonary disease (COPD) is a long-term (chronic) condition that affects the lungs. COPD is a general term that can be used to describe many different lung problems that cause lung swelling (inflammation) and limit airflow, including chronic bronchitis and emphysema. If you have COPD, your lung function will probably never return to normal. In most cases, it gets worse over time. However, there are steps you can take to slow the progression of the disease and improve your quality of life.  What are the causes?  This condition may be caused by:  · Smoking. This is the most common cause.  · Certain genes passed down through families.  What increases the risk?  The following factors may make you more likely to develop this condition:  · Secondhand smoke from cigarettes, pipes, or cigars.  · Exposure to chemicals and other irritants such as fumes and dust in the work environment.  · Chronic lung conditions or infections.  What are the signs or symptoms?  Symptoms of this condition include:  · Shortness of breath, especially during physical activity.  · Chronic cough with a large amount of thick mucus. Sometimes the cough may not have any mucus (dry cough).  · Wheezing.  · Rapid breaths.  · Gray or bluish discoloration (cyanosis) of the skin, especially in your fingers, toes, or lips.  · Feeling tired (fatigue).  · Weight loss.  · Chest tightness.  · Frequent infections.  · Episodes when breathing symptoms become much worse (exacerbations).  · Swelling in the ankles, feet, or legs. This may occur in later stages of the disease.  How is this diagnosed?  This condition is diagnosed based on:  · Your medical history.  · A physical exam.  You may also have tests, including:  · Lung (pulmonary) function tests. This may include a spirometry test, which measures your ability to exhale properly.  · Chest  X-ray.  · CT scan.  · Blood tests.  How is this treated?  This condition may be treated with:  · Medicines. These may include inhaled rescue medicines to treat acute exacerbations as well as long-term, or maintenance, medicines to prevent flare-ups of COPD.  ? Bronchodilators help treat COPD by dilating the airways to allow increased airflow and make your breathing more comfortable.  ? Steroids can reduce airway inflammation and help prevent exacerbations.  · Smoking cessation. If you smoke, your health care provider may ask you to quit, and may also recommend therapy or replacement products to help you quit.  · Pulmonary rehabilitation. This may involve working with a team of health care providers and specialists, such as respiratory, occupational, and physical therapists.  · Exercise and physical activity. These are beneficial for nearly all people with COPD.  · Nutrition therapy to gain weight, if you are underweight.  · Oxygen. Supplemental oxygen therapy is only helpful if you have a low oxygen level in your blood (hypoxemia).  · Lung surgery or transplant.  · Palliative care. This is to help people with COPD feel comfortable when treatment is no longer working.  Follow these instructions at home:  Medicines  · Take over-the-counter and prescription medicines (inhaled or pills) only as told by your health care provider.  · Talk to your health care provider before taking any cough or allergy medicines. You may need to avoid certain medicines that dry out your airways.  Lifestyle  · If you are a smoker, the most important thing that you can do is to stop smoking. Do not use any products that contain nicotine or tobacco, such as cigarettes and e-cigarettes. If you need help quitting, ask your health care provider. Continuing to smoke will cause the disease to progress faster.  · Avoid exposure to things that irritate your lungs, such as smoke, chemicals, and fumes.  · Stay active, but balance activity with periods  of rest. Exercise and physical activity will help you maintain your ability to do things you want to do.  · Learn and use relaxation techniques to manage stress and to control your breathing.  · Get the right amount of sleep and get quality sleep. Most adults need 7 or more hours per night.  · Eat healthy foods. Eating smaller, more frequent meals and resting before meals may help you maintain your strength.  Controlled breathing  Learn and use controlled breathing techniques as directed by your health care provider. Controlled breathing techniques include:  · Pursed lip breathing. Start by breathing in (inhaling) through your nose for 1 second. Then, purse your lips as if you were going to whistle and breathe out (exhale) through the pursed lips for 2 seconds.  · Diaphragmatic breathing. Start by putting one hand on your abdomen just above your waist. Inhale slowly through your nose. The hand on your abdomen should move out. Then purse your lips and exhale slowly. You should be able to feel the hand on your abdomen moving in as you exhale.  Controlled coughing  Learn and use controlled coughing to clear mucus from your lungs. Controlled coughing is a series of short, progressive coughs. The steps of controlled coughing are:  1. Lean your head slightly forward.  2. Breathe in deeply using diaphragmatic breathing.  3. Try to hold your breath for 3 seconds.  4. Keep your mouth slightly open while coughing twice.  5. Spit any mucus out into a tissue.  6. Rest and repeat the steps once or twice as needed.  General instructions  · Make sure you receive all the vaccines that your health care provider recommends, especially the pneumococcal and influenza vaccines. Preventing infection and hospitalization is very important when you have COPD.  · Use oxygen therapy and pulmonary rehabilitation if directed to by your health care provider. If you require home oxygen therapy, ask your health care provider whether you should  purchase a pulse oximeter to measure your oxygen level at home.  · Work with your health care provider to develop a COPD action plan. This will help you know what steps to take if your condition gets worse.  · Keep other chronic health conditions under control as told by your health care provider.  · Avoid extreme temperature and humidity changes.  · Avoid contact with people who have an illness that spreads from person to person (is contagious), such as viral infections or pneumonia.  · Keep all follow-up visits as told by your health care provider. This is important.  Contact a health care provider if:  · You are coughing up more mucus than usual.  · There is a change in the color or thickness of your mucus.  · Your breathing is more labored than usual.  · Your breathing is faster than usual.  · You have difficulty sleeping.  · You need to use your rescue medicines or inhalers more often than expected.  · You have trouble doing routine activities such as getting dressed or walking around the house.  Get help right away if:  · You have shortness of breath while you are resting.  · You have shortness of breath that prevents you from:  ? Being able to talk.  ? Performing your usual physical activities.  · You have chest pain lasting longer than 5 minutes.  · Your skin color is more blue (cyanotic) than usual.  · You measure low oxygen saturations for longer than 5 minutes with a pulse oximeter.  · You have a fever.  · You feel too tired to breathe normally.  Summary  · Chronic obstructive pulmonary disease (COPD) is a long-term (chronic) condition that affects the lungs.  · Your lung function will probably never return to normal. In most cases, it gets worse over time. However, there are steps you can take to slow the progression of the disease and improve your quality of life.  · Treatment for COPD may include taking medicines, quitting smoking, pulmonary rehabilitation, and changes to diet and exercise. As the  disease progresses, you may need oxygen therapy, a lung transplant, or palliative care.  · To help manage your condition, do not smoke, avoid exposure to things that irritate your lungs, stay up to date on all vaccines, and follow your health care provider's instructions for taking medicines.  This information is not intended to replace advice given to you by your health care provider. Make sure you discuss any questions you have with your health care provider.  Document Revised: 11/30/2018 Document Reviewed: 01/22/2018  [employer] Patient Education © 2020 [employer]

## 2021-01-06 RX ORDER — PANTOPRAZOLE SODIUM 20 MG/1
20 TABLET, DELAYED RELEASE ORAL DAILY
Qty: 90 TABLET | Refills: 1 | Status: SHIPPED | OUTPATIENT
Start: 2021-01-06 | End: 2021-06-28

## 2021-02-02 DIAGNOSIS — E78.5 HYPERLIPIDEMIA LDL GOAL <100: ICD-10-CM

## 2021-02-02 DIAGNOSIS — F32.89 OTHER DEPRESSION: ICD-10-CM

## 2021-02-02 DIAGNOSIS — I10 ESSENTIAL HYPERTENSION: ICD-10-CM

## 2021-02-03 RX ORDER — METOPROLOL TARTRATE 50 MG/1
TABLET, FILM COATED ORAL
Qty: 180 TABLET | Refills: 0 | Status: SHIPPED | OUTPATIENT
Start: 2021-02-03 | End: 2021-05-03

## 2021-02-03 RX ORDER — CITALOPRAM 20 MG/1
TABLET ORAL
Qty: 90 TABLET | Refills: 0 | Status: SHIPPED | OUTPATIENT
Start: 2021-02-03 | End: 2021-05-03

## 2021-02-03 RX ORDER — SIMVASTATIN 20 MG
TABLET ORAL
Qty: 90 TABLET | Refills: 0 | Status: SHIPPED | OUTPATIENT
Start: 2021-02-03 | End: 2021-05-03

## 2021-03-02 DIAGNOSIS — I10 ESSENTIAL HYPERTENSION: ICD-10-CM

## 2021-03-03 ENCOUNTER — OFFICE VISIT (OUTPATIENT)
Dept: FAMILY MEDICINE CLINIC | Facility: CLINIC | Age: 66
End: 2021-03-03

## 2021-03-03 VITALS
TEMPERATURE: 97.6 F | BODY MASS INDEX: 28.92 KG/M2 | OXYGEN SATURATION: 98 % | DIASTOLIC BLOOD PRESSURE: 68 MMHG | WEIGHT: 232.6 LBS | SYSTOLIC BLOOD PRESSURE: 138 MMHG | HEIGHT: 75 IN | HEART RATE: 69 BPM

## 2021-03-03 DIAGNOSIS — M54.50 LUMBAR BACK PAIN: Primary | ICD-10-CM

## 2021-03-03 DIAGNOSIS — M62.838 CERVICAL PARASPINAL MUSCLE SPASM: ICD-10-CM

## 2021-03-03 DIAGNOSIS — I10 ESSENTIAL HYPERTENSION: ICD-10-CM

## 2021-03-03 PROCEDURE — 99213 OFFICE O/P EST LOW 20 MIN: CPT | Performed by: FAMILY MEDICINE

## 2021-03-03 RX ORDER — AMLODIPINE BESYLATE 5 MG/1
TABLET ORAL
Qty: 90 TABLET | Refills: 1 | Status: SHIPPED | OUTPATIENT
Start: 2021-03-03 | End: 2021-07-30

## 2021-03-03 NOTE — PROGRESS NOTES
OFFICE VISIT NOTE:    Rupa Verma is a 65 y.o. male who presents today for Back Pain and Hypertension.     Was at Dr Larsen's office, and had an abnormal UDS apparently, but he didn't know what was in it. Needs new PM referral. Will request records form them to review, including UDS.     Will Rx short-term pain med until new appt made - he understands this will be short-term only.       Back Pain  This is a chronic problem. The current episode started more than 1 year ago. The problem occurs daily. The problem has been waxing and waning since onset. The pain is present in the lumbar spine and sacro-iliac. The quality of the pain is described as cramping and aching. The pain does not radiate. The pain is severe. The pain is the same all the time. The symptoms are aggravated by bending and twisting. Stiffness is present in the morning. Pertinent negatives include no abdominal pain, chest pain, fever, headaches or weight loss. He has tried analgesics, bed rest and home exercises for the symptoms. The treatment provided moderate relief.   Hypertension  This is a chronic problem. The current episode started more than 1 year ago. The problem is unchanged. The problem is controlled. Pertinent negatives include no chest pain, headaches, orthopnea, palpitations, peripheral edema, PND or shortness of breath. There are no associated agents to hypertension. The current treatment provides significant improvement. There are no compliance problems.         Past medical/surgical history, Family history, Social history, Allergies and Medications have been reviewed with the patient today and are updated in [facility] EMR. See below.  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   • Left upper lobe pulmonary nodule 9/12/2019     Past Surgical History:   Procedure Laterality Date   • CHOLECYSTECTOMY     • COLONOSCOPY     • ENDOSCOPY     • PATELLA FRACTURE SURGERY     • TONSILLECTOMY        Family History   Problem Relation Age of Onset   • Hypertension Mother    • No Known Problems Father    • Diabetes Neg Hx    • Cancer Neg Hx    • Stroke Neg Hx      Social History     Tobacco Use   • Smoking status: Current Every Day Smoker     Packs/day: 1.00     Years: 50.00     Pack years: 50.00     Types: Cigarettes   • Smokeless tobacco: Never Used   Substance Use Topics   • Alcohol use: Yes     Comment: occ   • Drug use: No       ALLERGIES:  Patient has no known allergies.    CURRENT MEDS:    Current Outpatient Medications:   •  aspirin 81 MG tablet, aspirin 81 mg tablet,delayed release, Disp: , Rfl:   •  citalopram (CeleXA) 20 MG tablet, TAKE ONE TABLET BY MOUTH EVERY DAY, Disp: 90 tablet, Rfl: 0  •  cyclobenzaprine (FLEXERIL) 10 MG tablet, Take 1 tablet by mouth 3 (Three) Times a Day As Needed for Muscle Spasms., Disp: 20 tablet, Rfl: 0  •  HYDROcodone-acetaminophen (Norco) 7.5-325 MG per tablet, Take 1 tablet by mouth Every 12 (Twelve) Hours As Needed for Moderate Pain  or Severe Pain ., Disp: 60 tablet, Rfl: 0  •  metoprolol tartrate (LOPRESSOR) 50 MG tablet, TAKE ONE TABLET BY MOUTH TWICE A DAY, Disp: 180 tablet, Rfl: 0  •  pantoprazole (PROTONIX) 20 MG EC tablet, Take 1 tablet by mouth Daily., Disp: 90 tablet, Rfl: 1  •  simvastatin (ZOCOR) 20 MG tablet, TAKE ONE TABLET BY MOUTH EVERY DAY, Disp: 90 tablet, Rfl: 0  •  amLODIPine (NORVASC) 5 MG tablet, TAKE ONE TABLET BY MOUTH EVERY DAY, Disp: 90 tablet, Rfl: 1    REVIEW OF SYSTEMS:  Review of Systems   Constitutional: Negative for activity change, appetite change, fatigue, fever, unexpected weight gain and unexpected weight loss.   Respiratory: Negative for shortness of breath.    Cardiovascular: Negative for chest pain, palpitations, orthopnea and PND.   Gastrointestinal: Negative for abdominal pain.   Genitourinary: Negative for difficulty urinating.   Musculoskeletal: Positive for back pain.   Skin: Negative for rash.   Neurological: Negative for  "syncope and headache.       PHYSICAL EXAMINATION:  Vital Signs:  /68 (BP Location: Right arm, Patient Position: Sitting, Cuff Size: Large Adult)   Pulse 69   Temp 97.6 °F (36.4 °C)   Ht 190.5 cm (75\") Comment: pt reported  Wt 106 kg (232 lb 9.6 oz)   SpO2 98%   BMI 29.07 kg/m²   Vitals:    03/03/21 1204   Patient Position: Sitting       Physical Exam  Vitals and nursing note reviewed.   Constitutional:       General: He is not in acute distress.     Appearance: He is well-developed.   HENT:      Head: Normocephalic and atraumatic.      Mouth/Throat:      Mouth: Mucous membranes are moist.      Pharynx: Oropharynx is clear.   Eyes:      Extraocular Movements: Extraocular movements intact.      Pupils: Pupils are equal, round, and reactive to light.   Neck:      Vascular: No JVD.   Cardiovascular:      Rate and Rhythm: Normal rate and regular rhythm.      Pulses: Normal pulses.      Heart sounds: Normal heart sounds.   Pulmonary:      Effort: Pulmonary effort is normal. No respiratory distress.      Breath sounds: Normal breath sounds.   Abdominal:      General: Bowel sounds are normal. There is no distension.      Palpations: Abdomen is soft.      Tenderness: There is no abdominal tenderness.   Musculoskeletal:         General: Normal range of motion.      Cervical back: Normal range of motion and neck supple.   Skin:     General: Skin is warm and dry.      Capillary Refill: Capillary refill takes less than 2 seconds.      Findings: No rash.   Neurological:      General: No focal deficit present.      Mental Status: He is alert and oriented to person, place, and time.      Cranial Nerves: No cranial nerve deficit.   Psychiatric:         Mood and Affect: Mood normal.         Behavior: Behavior normal.         Procedures    ASSESSMENT/ PLAN:  Problem List Items Addressed This Visit        Cardiac and Vasculature    Essential hypertension       Musculoskeletal and Injuries    Lumbar back pain - Primary    " Relevant Orders    Ambulatory Referral to Pain Management    Cervical paraspinal muscle spasm    Relevant Orders    Ambulatory Referral to Pain Management            Specific Patient Instructions:  MEDICATION Instructions: Encouraged patient to continue routine medicines as prescribed and maintain compliance. Patient instructed to report any adverse side effects or reactions to medicines promptly to the office. Patient instructed to make us aware of any OTC or herbal meds or supplement use.    DIET Recommendations: Patient instructed and provided information on the following nutrition and diet recommendations: Calorie restriction for weight reduction and maintenance. Necessity for adequate daily intake of fluids/water. Also, diet information provided in AVS for specifics.    EXERCISE Instructions: Discussed with patient the need for routine aerobic activity for cardiovascular fitness, 3 times a week for about 30 minutes. Daily exercise for increased fitness and weight reduction goals.    SMOKING Recommendations: Counseled patient and encouraged them on smoking and tobacco cessation if applicable. Discussed the benefits to all body systems with smoking/tobacco cessation, including decreased cardiac/lung/stroke/cancer risk. Encouraged no vaping use.    HEALTH MAINTENANCE:  Counseling provided to patient/family about routine health maintenance and ANNUAL physicals/labs. Counseling on recommended Vaccinations appropriate for age needed.        Patient's Body mass index is 29.07 kg/m². BMI is above normal parameters. Recommendations include: exercise counseling and nutrition counseling.      Medications or Orders placed this visit:  Orders Placed This Encounter   Procedures   • Ambulatory Referral to Pain Management     Referral Priority:   Routine     Referral Type:   Pain Management     Referral Reason:   Specialty Services Required     Requested Specialty:   Pain Medicine     Number of Visits Requested:   1        Medications DISCONTINUED this visit:  There are no discontinued medications.    FOLLOW-UP:  No follow-ups on file.    I discussed the patients findings and my recommendations with patient.  An After Visit Summary (AVS) was printed and given to the patient at discharge.        Jose Leyva MD, FAAFP  3/3/2021

## 2021-03-15 ENCOUNTER — TELEPHONE (OUTPATIENT)
Dept: FAMILY MEDICINE CLINIC | Facility: CLINIC | Age: 66
End: 2021-03-15

## 2021-03-15 NOTE — TELEPHONE ENCOUNTER
Caller:   SELF      Medication needed:   HYDROcodone-acetaminophen (Norco) 7.5-325 MG per tablet        When do you need the refill by: ASAP    Does the patient have less than a 3 day supply:  [x] Yes  [] No    What is the patient's preferred pharmacy:        [facility] [address] [phone] [facility] [phone]

## 2021-03-15 NOTE — TELEPHONE ENCOUNTER
Called and left patient a message on vm advising Dr. Leyva stated he would not be filling NORCO and also advised [facility]

## 2021-03-16 ENCOUNTER — TELEPHONE (OUTPATIENT)
Dept: FAMILY MEDICINE CLINIC | Facility: CLINIC | Age: 66
End: 2021-03-16

## 2021-03-16 NOTE — TELEPHONE ENCOUNTER
PATIENT CALLS       REQUESTING A CALL BACK WITH HIS REFERRAL INFORMATION       [facility] CONTACT NUMBER   [phone] (M)

## 2021-03-16 NOTE — TELEPHONE ENCOUNTER
Spoke with patient. Advised patient that referral has been made to [facility] and they will contact to schedule appt. Patient voiced understanding.

## 2021-03-22 PROBLEM — G47.33 OBSTRUCTIVE SLEEP APNEA: Status: ACTIVE | Noted: 2018-12-19

## 2021-03-24 ENCOUNTER — TELEPHONE (OUTPATIENT)
Dept: FAMILY MEDICINE CLINIC | Facility: CLINIC | Age: 66
End: 2021-03-24

## 2021-03-24 NOTE — TELEPHONE ENCOUNTER
PATIENT CALLED IN REQUESTING A CALL BACK   PATIENT STATES HE MISSED A CALL FROM SHIRA MOSES CALL BACK   [phone]

## 2021-04-07 ENCOUNTER — TELEPHONE (OUTPATIENT)
Dept: FAMILY MEDICINE CLINIC | Facility: CLINIC | Age: 66
End: 2021-04-07

## 2021-04-07 NOTE — TELEPHONE ENCOUNTER
Caller: Major Verma    Relationship: Self    Best call back number: [phone]     What is the best time to reach you: ANYTIME    Who are you requesting to speak with (clinical staff, provider,  specific staff member): CLINICAL STAFF    Do you know the name of the person who called: MAJOR VERMA    What was the call regarding: NEEDING TO HAVE A MRI SCHEDULED FOR PAIN MANAGEMENT     Do you require a callback: YES

## 2021-04-08 NOTE — TELEPHONE ENCOUNTER
Called patient back and advised that [facility] can not be scheduled until he provides the dismissal letter and UDS results from [facility] and provides them to [facility]. Patient voiced understanding and stated he would try and contact them and have them mailed to him however he stated they told him he was not allowed back on their property.

## 2021-04-20 NOTE — TELEPHONE ENCOUNTER
PATIENT CALLING IN TO CHECK STATUS OF THIS REQUEST. STATES ITS BEEN MORE THAN A MONTH AND HE HASN'T HEARD ANYTHING.     PLEASE CONTACT AND ADVISE

## 2021-04-22 NOTE — TELEPHONE ENCOUNTER
Dr. Leyva has seen and treated patient for his low back pain. I will forward to Dr. Leyva to see if MRI is appropriate and, if so, does he need a new appointment to discuss it.

## 2021-04-30 DIAGNOSIS — F32.89 OTHER DEPRESSION: ICD-10-CM

## 2021-04-30 DIAGNOSIS — I10 ESSENTIAL HYPERTENSION: ICD-10-CM

## 2021-04-30 DIAGNOSIS — E78.5 HYPERLIPIDEMIA LDL GOAL <100: ICD-10-CM

## 2021-05-03 RX ORDER — CITALOPRAM 20 MG/1
TABLET ORAL
Qty: 90 TABLET | Refills: 0 | Status: SHIPPED | OUTPATIENT
Start: 2021-05-03 | End: 2021-07-30

## 2021-05-03 RX ORDER — SIMVASTATIN 20 MG
TABLET ORAL
Qty: 90 TABLET | Refills: 0 | Status: SHIPPED | OUTPATIENT
Start: 2021-05-03 | End: 2021-07-30

## 2021-05-03 RX ORDER — METOPROLOL TARTRATE 50 MG/1
TABLET, FILM COATED ORAL
Qty: 180 TABLET | Refills: 0 | Status: SHIPPED | OUTPATIENT
Start: 2021-05-03 | End: 2021-07-30

## 2021-05-04 NOTE — TELEPHONE ENCOUNTER
Called and spoke with patient. Patient is confused and stated he has no idea who called him and advised he needed an MRI. Patient stated he thought we called and told him this. I advised patient he should make an appt. To speak with Dr. Leyva about this. Patient was agreeable with an appt.  Appt was scheduled for 5/10/21 with Dr. Leyva.

## 2021-05-10 ENCOUNTER — OFFICE VISIT (OUTPATIENT)
Dept: FAMILY MEDICINE CLINIC | Facility: CLINIC | Age: 66
End: 2021-05-10

## 2021-05-10 VITALS
DIASTOLIC BLOOD PRESSURE: 72 MMHG | HEIGHT: 75 IN | TEMPERATURE: 97.8 F | BODY MASS INDEX: 29.12 KG/M2 | OXYGEN SATURATION: 98 % | HEART RATE: 75 BPM | WEIGHT: 234.2 LBS | SYSTOLIC BLOOD PRESSURE: 132 MMHG

## 2021-05-10 DIAGNOSIS — M54.16 LUMBAR RADICULOPATHY: Primary | ICD-10-CM

## 2021-05-10 DIAGNOSIS — M62.830 LUMBAR PARASPINAL MUSCLE SPASM: ICD-10-CM

## 2021-05-10 DIAGNOSIS — I10 ESSENTIAL HYPERTENSION: ICD-10-CM

## 2021-05-10 DIAGNOSIS — M54.50 LUMBAR BACK PAIN: ICD-10-CM

## 2021-05-10 PROCEDURE — 96372 THER/PROPH/DIAG INJ SC/IM: CPT | Performed by: FAMILY MEDICINE

## 2021-05-10 PROCEDURE — 99214 OFFICE O/P EST MOD 30 MIN: CPT | Performed by: FAMILY MEDICINE

## 2021-05-10 RX ORDER — METHYLPREDNISOLONE 4 MG/1
TABLET ORAL
Qty: 21 EACH | Refills: 0 | Status: SHIPPED | OUTPATIENT
Start: 2021-05-10 | End: 2021-06-09

## 2021-05-10 RX ORDER — TIZANIDINE HYDROCHLORIDE 4 MG/1
4 CAPSULE, GELATIN COATED ORAL 3 TIMES DAILY PRN
Qty: 30 CAPSULE | Refills: 0 | Status: SHIPPED | OUTPATIENT
Start: 2021-05-10 | End: 2021-08-19

## 2021-05-10 RX ORDER — METHYLPREDNISOLONE ACETATE 40 MG/ML
40 INJECTION, SUSPENSION INTRA-ARTICULAR; INTRALESIONAL; INTRAMUSCULAR; SOFT TISSUE ONCE
Status: COMPLETED | OUTPATIENT
Start: 2021-05-10 | End: 2021-05-10

## 2021-05-10 RX ADMIN — METHYLPREDNISOLONE ACETATE 40 MG: 40 INJECTION, SUSPENSION INTRA-ARTICULAR; INTRALESIONAL; INTRAMUSCULAR; SOFT TISSUE at 14:04

## 2021-05-10 NOTE — PATIENT INSTRUCTIONS
Advance Directive    Advance directives are legal documents that let you make choices ahead of time about your health care and medical treatment in case you become unable to communicate for yourself. Advance directives are a way for you to make known your wishes to family, friends, and health care providers. This can let others know about your end-of-life care if you become unable to communicate.  Discussing and writing advance directives should happen over time rather than all at once. Advance directives can be changed depending on your situation and what you want, even after you have signed the advance directives.  There are different types of advance directives, such as:  · Medical power of .  · Living will.  · Do not resuscitate (DNR) or do not attempt resuscitation (DNAR) order.  Health care proxy and medical power of   A health care proxy is also called a health care agent. This is a person who is appointed to make medical decisions for you in cases where you are unable to make the decisions yourself. Generally, people choose someone they know well and trust to represent their preferences. Make sure to ask this person for an agreement to act as your proxy. A proxy may have to exercise judgment in the event of a medical decision for which your wishes are not known.  A medical power of  is a legal document that names your health care proxy. Depending on the laws in your state, after the document is written, it may also need to be:  · Signed.  · Notarized.  · Dated.  · Copied.  · Witnessed.  · Incorporated into your medical record.  You may also want to appoint someone to manage your money in a situation in which you are unable to do so. This is called a durable power of  for finances. It is a separate legal document from the durable power of  for health care. You may choose the same person or someone different from your health care proxy to act as your agent in money  matters.  If you do not appoint a proxy, or if there is a concern that the proxy is not acting in your best interests, a court may appoint a guardian to act on your behalf.  Living will  A living will is a set of instructions that state your wishes about medical care when you cannot express them yourself. Health care providers should keep a copy of your living will in your medical record. You may want to give a copy to family members or friends. To alert caregivers in case of an emergency, you can place a card in your wallet to let them know that you have a living will and where they can find it. A living will is used if you become:  · Terminally ill.  · Disabled.  · Unable to communicate or make decisions.  Items to consider in your living will include:  · To use or not to use life-support equipment, such as dialysis machines and breathing machines (ventilators).  · A DNR or DNAR order. This tells health care providers not to use cardiopulmonary resuscitation (CPR) if breathing or heartbeat stops.  · To use or not to use tube feeding.  · To be given or not to be given food and fluids.  · Comfort (palliative) care when the goal becomes comfort rather than a cure.  · Donation of organs and tissues.  A living will does not give instructions for distributing your money and property if you should pass away.  DNR or DNAR  A DNR or DNAR order is a request not to have CPR in the event that your heart stops beating or you stop breathing. If a DNR or DNAR order has not been made and shared, a health care provider will try to help any patient whose heart has stopped or who has stopped breathing. If you plan to have surgery, talk with your health care provider about how your DNR or DNAR order will be followed if problems occur.  What if I do not have an advance directive?  If you do not have an advance directive, some states assign family decision makers to act on your behalf based on how closely you are related to them. Each  state has its own laws about advance directives. You may want to check with your health care provider, , or state representative about the laws in your state.  Summary  · Advance directives are the legal documents that allow you to make choices ahead of time about your health care and medical treatment in case you become unable to tell others about your care.  · The process of discussing and writing advance directives should happen over time. You can change the advance directives, even after you have signed them.  · Advance directives include DNR or DNAR orders, living diallo, and designating an agent as your medical power of .  This information is not intended to replace advice given to you by your health care provider. Make sure you discuss any questions you have with your health care provider.  Document Revised: 01/28/2021 Document Reviewed: 07/16/2020  [employer] Patient Education © 2021 [employer]      Chronic Back Pain  When back pain lasts longer than 3 months, it is called chronic back pain. The cause of your back pain may not be known. Some common causes include:  · Wear and tear (degenerative disease) of the bones, ligaments, or disks in your back.  · Inflammation and stiffness in your back (arthritis).  People who have chronic back pain often go through certain periods in which the pain is more intense (flare-ups). Many people can learn to manage the pain with home care.  Follow these instructions at home:  Pay attention to any changes in your symptoms. Take these actions to help with your pain:  Activity    · Avoid bending and other activities that make the problem worse.  · Maintain a proper position when standing or sitting:  ? When standing, keep your upper back and neck straight, with your shoulders pulled back. Avoid slouching.  ? When sitting, keep your back straight and relax your shoulders. Do not round your shoulders or pull them backward.  · Do not sit or  one place for  long periods of time.  · Take brief periods of rest throughout the day. This will reduce your pain. Resting in a lying or standing position is usually better than sitting to rest.  · When you are resting for longer periods, mix in some mild activity or stretching between periods of rest. This will help to prevent stiffness and pain.  · Get regular exercise. Ask your health care provider what activities are safe for you.  · Do not lift anything that is heavier than 10 lb (4.5 kg). Always use proper lifting technique, which includes:  ? Bending your knees.  ? Keeping the load close to your body.  ? Avoiding twisting.  · Sleep on a firm mattress in a comfortable position. Try lying on your side with your knees slightly bent. If you lie on your back, put a pillow under your knees.  Managing pain  · If directed, apply ice to the painful area. Your health care provider may recommend applying ice during the first 24-48 hours after a flare-up begins.  ? Put ice in a plastic bag.  ? Place a towel between your skin and the bag.  ? Leave the ice on for 20 minutes, 2-3 times per day.  · If directed, apply heat to the affected area as often as told by your health care provider. Use the heat source that your health care provider recommends, such as a moist heat pack or a heating pad.  ? Place a towel between your skin and the heat source.  ? Leave the heat on for 20-30 minutes.  ? Remove the heat if your skin turns bright red. This is especially important if you are unable to feel pain, heat, or cold. You may have a greater risk of getting burned.  · Try soaking in a warm tub.  · Take over-the-counter and prescription medicines only as told by your health care provider.  · Keep all follow-up visits as told by your health care provider. This is important.  Contact a health care provider if:  · You have pain that is not relieved with rest or medicine.  Get help right away if:  · You have weakness or numbness in one or both of your  legs or feet.  · You have trouble controlling your bladder or your bowels.  · You have nausea or vomiting.  · You have pain in your abdomen.  · You have shortness of breath or you faint.  This information is not intended to replace advice given to you by your health care provider. Make sure you discuss any questions you have with your health care provider.  Document Revised: 04/09/2020 Document Reviewed: 06/27/2018  [employer] Patient Education © 2020 [employer]

## 2021-06-09 ENCOUNTER — OFFICE VISIT (OUTPATIENT)
Dept: FAMILY MEDICINE CLINIC | Facility: CLINIC | Age: 66
End: 2021-06-09

## 2021-06-09 VITALS
OXYGEN SATURATION: 96 % | WEIGHT: 228.6 LBS | TEMPERATURE: 97.8 F | SYSTOLIC BLOOD PRESSURE: 150 MMHG | HEIGHT: 75 IN | DIASTOLIC BLOOD PRESSURE: 68 MMHG | HEART RATE: 73 BPM | BODY MASS INDEX: 28.42 KG/M2

## 2021-06-09 DIAGNOSIS — M62.830 LUMBAR PARASPINAL MUSCLE SPASM: Primary | ICD-10-CM

## 2021-06-09 DIAGNOSIS — M54.16 LUMBAR RADICULOPATHY: ICD-10-CM

## 2021-06-09 DIAGNOSIS — M54.50 LUMBAR BACK PAIN: ICD-10-CM

## 2021-06-09 DIAGNOSIS — I10 ESSENTIAL HYPERTENSION: ICD-10-CM

## 2021-06-09 PROCEDURE — 96372 THER/PROPH/DIAG INJ SC/IM: CPT | Performed by: FAMILY MEDICINE

## 2021-06-09 PROCEDURE — 99214 OFFICE O/P EST MOD 30 MIN: CPT | Performed by: FAMILY MEDICINE

## 2021-06-09 RX ORDER — CYCLOBENZAPRINE HCL 10 MG
10 TABLET ORAL 2 TIMES DAILY PRN
Qty: 30 TABLET | Refills: 1 | Status: SHIPPED | OUTPATIENT
Start: 2021-06-09 | End: 2021-11-02

## 2021-06-09 RX ORDER — METHYLPREDNISOLONE ACETATE 80 MG/ML
80 INJECTION, SUSPENSION INTRA-ARTICULAR; INTRALESIONAL; INTRAMUSCULAR; SOFT TISSUE ONCE
Status: COMPLETED | OUTPATIENT
Start: 2021-06-09 | End: 2021-06-09

## 2021-06-09 RX ADMIN — METHYLPREDNISOLONE ACETATE 80 MG: 80 INJECTION, SUSPENSION INTRA-ARTICULAR; INTRALESIONAL; INTRAMUSCULAR; SOFT TISSUE at 09:52

## 2021-06-09 NOTE — PROGRESS NOTES
OFFICE VISIT NOTE:    Rupa Verma is a 66 y.o. male who presents today for Back Pain (4 wk FU).     Still not heard anything from [facility] or other PM office regarding pain meds.   Relates insurance didn't cover the Zanaflex - didn't get the XRays either yet.     Back Pain  This is a chronic problem. The current episode started more than 1 year ago. The problem occurs constantly. The problem is unchanged. The pain is present in the lumbar spine and sacro-iliac. The quality of the pain is described as cramping and aching. The pain does not radiate. The pain is severe. The pain is the same all the time. The symptoms are aggravated by bending and twisting. Stiffness is present in the morning. Pertinent negatives include no abdominal pain, chest pain, fever, headaches or weight loss. He has tried analgesics, bed rest and home exercises for the symptoms. The treatment provided significant relief.   Hypertension  This is a chronic problem. The current episode started more than 1 year ago. The problem is unchanged. The problem is controlled. Pertinent negatives include no chest pain, headaches, orthopnea, palpitations, peripheral edema, PND or shortness of breath. There are no associated agents to hypertension. The current treatment provides significant improvement. There are no compliance problems.         Past medical/surgical history, Family history, Social history, Allergies and Medications have been reviewed with the patient today and are updated in [facility] EMR. See below.  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   • Left upper lobe pulmonary nodule 9/12/2019   • Obstructive sleep apnea 12/19/2018     Past Surgical History:   Procedure Laterality Date   • CHOLECYSTECTOMY     • COLONOSCOPY     • ENDOSCOPY     • PATELLA FRACTURE SURGERY     • TONSILLECTOMY       Family History   Problem Relation Age of Onset   • Hypertension Mother    • No Known Problems Father     • Diabetes Neg Hx    • Cancer Neg Hx    • Stroke Neg Hx      Social History     Tobacco Use   • Smoking status: Current Every Day Smoker     Packs/day: 1.00     Years: 50.00     Pack years: 50.00     Types: Cigarettes   • Smokeless tobacco: Never Used   Substance Use Topics   • Alcohol use: Yes     Comment: occ   • Drug use: No       ALLERGIES:  Patient has no known allergies.    CURRENT MEDS:    Current Outpatient Medications:   •  amLODIPine (NORVASC) 5 MG tablet, TAKE ONE TABLET BY MOUTH EVERY DAY, Disp: 90 tablet, Rfl: 1  •  aspirin 81 MG tablet, aspirin 81 mg tablet,delayed release, Disp: , Rfl:   •  citalopram (CeleXA) 20 MG tablet, TAKE ONE TABLET BY MOUTH EVERY DAY, Disp: 90 tablet, Rfl: 0  •  metoprolol tartrate (LOPRESSOR) 50 MG tablet, TAKE ONE TABLET BY MOUTH TWICE A DAY, Disp: 180 tablet, Rfl: 0  •  pantoprazole (PROTONIX) 20 MG EC tablet, Take 1 tablet by mouth Daily., Disp: 90 tablet, Rfl: 1  •  simvastatin (ZOCOR) 20 MG tablet, TAKE ONE TABLET BY MOUTH EVERY DAY, Disp: 90 tablet, Rfl: 0  •  TiZANidine (ZANAFLEX) 4 MG capsule, Take 1 capsule by mouth 3 (Three) Times a Day As Needed for Muscle Spasms., Disp: 30 capsule, Rfl: 0  •  cyclobenzaprine (FLEXERIL) 10 MG tablet, Take 1 tablet by mouth 2 (Two) Times a Day As Needed for Muscle Spasms., Disp: 30 tablet, Rfl: 1    REVIEW OF SYSTEMS:  Review of Systems   Constitutional: Negative for activity change, appetite change, fatigue, fever, unexpected weight gain and unexpected weight loss.   Respiratory: Negative for shortness of breath.    Cardiovascular: Negative for chest pain, palpitations, orthopnea and PND.   Gastrointestinal: Negative for abdominal pain.   Genitourinary: Negative for difficulty urinating.   Musculoskeletal: Positive for back pain.   Skin: Negative for rash.   Neurological: Negative for syncope and headache.       PHYSICAL EXAMINATION:  Vital Signs:  /68 (BP Location: Right arm, Patient Position: Sitting, Cuff Size: Large  "Adult)   Pulse 73   Temp 97.8 °F (36.6 °C)   Ht 190.5 cm (75\") Comment: pt reported  Wt 104 kg (228 lb 9.6 oz)   SpO2 96%   BMI 28.57 kg/m²   Vitals:    06/09/21 0912   Patient Position: Sitting       Physical Exam  Vitals and nursing note reviewed.   Constitutional:       General: He is not in acute distress.     Appearance: He is well-developed.   HENT:      Head: Normocephalic and atraumatic.      Mouth/Throat:      Mouth: Mucous membranes are moist.      Pharynx: Oropharynx is clear.   Eyes:      Extraocular Movements: Extraocular movements intact.      Pupils: Pupils are equal, round, and reactive to light.   Neck:      Vascular: No JVD.   Cardiovascular:      Rate and Rhythm: Normal rate and regular rhythm.      Pulses: Normal pulses.      Heart sounds: Normal heart sounds.   Pulmonary:      Effort: Pulmonary effort is normal. No respiratory distress.      Breath sounds: Normal breath sounds.   Abdominal:      General: Bowel sounds are normal. There is no distension.      Palpations: Abdomen is soft.      Tenderness: There is no abdominal tenderness.   Musculoskeletal:         General: Normal range of motion.      Cervical back: Normal range of motion and neck supple.   Skin:     General: Skin is warm and dry.      Capillary Refill: Capillary refill takes less than 2 seconds.      Findings: No rash.   Neurological:      General: No focal deficit present.      Mental Status: He is alert and oriented to person, place, and time.      Cranial Nerves: No cranial nerve deficit.   Psychiatric:         Mood and Affect: Mood normal.         Behavior: Behavior normal.         Procedures    ASSESSMENT/ PLAN:  Problem List Items Addressed This Visit        Cardiac and Vasculature    Essential hypertension       Musculoskeletal and Injuries    Lumbar back pain    Relevant Orders    Ambulatory Referral to Pain Management      Other Visit Diagnoses     Lumbar paraspinal muscle spasm    -  Primary    Relevant " Medications    cyclobenzaprine (FLEXERIL) 10 MG tablet    methylPREDNISolone acetate (DEPO-medrol) injection 80 mg (Completed)    Other Relevant Orders    Ambulatory Referral to Pain Management    Lumbar radiculopathy        Relevant Medications    methylPREDNISolone acetate (DEPO-medrol) injection 80 mg (Completed)    Other Relevant Orders    Ambulatory Referral to Pain Management            Specific Patient Instructions:  MEDICATION Instructions: Encouraged patient to continue routine medicines as prescribed and maintain compliance. Patient instructed to report any adverse side effects or reactions to medicines promptly to the office. Patient instructed to make us aware of any OTC or herbal meds or supplement use.    DIET Recommendations: Patient instructed and provided information on the following nutrition and diet recommendations: Calorie restriction for weight reduction and maintenance. Necessity for adequate daily intake of fluids/water. Also, diet information provided in AVS for specifics.    EXERCISE Instructions: Discussed with patient the need for routine aerobic activity for cardiovascular fitness, 3 times a week for about 30 minutes. Daily exercise for increased fitness and weight reduction goals.    SMOKING Recommendations: Counseled patient and encouraged them on smoking and tobacco cessation if applicable. Discussed the benefits to all body systems with smoking/tobacco cessation, including decreased cardiac/lung/stroke/cancer risk. Encouraged no vaping use.    HEALTH MAINTENANCE:  Counseling provided to patient/family about routine health maintenance and ANNUAL physicals/labs. Counseling on recommended Vaccinations appropriate for age needed.        Patient's Body mass index is 28.57 kg/m². indicating that he is overweight (BMI 25-29.9). Obesity-related health conditions include the following: hypertension and osteoarthritis. Obesity is unchanged. BMI is is above average; BMI management plan is  completed. We discussed portion control and increasing exercise..      Medications or Orders placed this visit:  Orders Placed This Encounter   Procedures   • Ambulatory Referral to Pain Management     Referral Priority:   Routine     Referral Type:   Pain Management     Referral Reason:   Specialty Services Required     Requested Specialty:   Pain Medicine     Number of Visits Requested:   1       Medications DISCONTINUED this visit:  Medications Discontinued During This Encounter   Medication Reason   • HYDROcodone-acetaminophen (Norco) 7.5-325 MG per tablet *Therapy completed   • methylPREDNISolone (MEDROL) 4 MG dose pack *Therapy completed       FOLLOW-UP:  Return in about 3 months (around 9/9/2021) for Recheck, Next scheduled follow up.    I discussed the patients findings and my recommendations with patient.  An After Visit Summary (AVS) was printed and given to the patient at discharge.        Jose Leyva MD, FAAFP  6/9/2021

## 2021-06-09 NOTE — PATIENT INSTRUCTIONS
Chronic Back Pain  When back pain lasts longer than 3 months, it is called chronic back pain. The cause of your back pain may not be known. Some common causes include:  · Wear and tear (degenerative disease) of the bones, ligaments, or disks in your back.  · Inflammation and stiffness in your back (arthritis).  People who have chronic back pain often go through certain periods in which the pain is more intense (flare-ups). Many people can learn to manage the pain with home care.  Follow these instructions at home:  Pay attention to any changes in your symptoms. Take these actions to help with your pain:  Activity    · Avoid bending and other activities that make the problem worse.  · Maintain a proper position when standing or sitting:  ? When standing, keep your upper back and neck straight, with your shoulders pulled back. Avoid slouching.  ? When sitting, keep your back straight and relax your shoulders. Do not round your shoulders or pull them backward.  · Do not sit or  one place for long periods of time.  · Take brief periods of rest throughout the day. This will reduce your pain. Resting in a lying or standing position is usually better than sitting to rest.  · When you are resting for longer periods, mix in some mild activity or stretching between periods of rest. This will help to prevent stiffness and pain.  · Get regular exercise. Ask your health care provider what activities are safe for you.  · Do not lift anything that is heavier than 10 lb (4.5 kg). Always use proper lifting technique, which includes:  ? Bending your knees.  ? Keeping the load close to your body.  ? Avoiding twisting.  · Sleep on a firm mattress in a comfortable position. Try lying on your side with your knees slightly bent. If you lie on your back, put a pillow under your knees.  Managing pain  · If directed, apply ice to the painful area. Your health care provider may recommend applying ice during the first 24-48 hours after  a flare-up begins.  ? Put ice in a plastic bag.  ? Place a towel between your skin and the bag.  ? Leave the ice on for 20 minutes, 2-3 times per day.  · If directed, apply heat to the affected area as often as told by your health care provider. Use the heat source that your health care provider recommends, such as a moist heat pack or a heating pad.  ? Place a towel between your skin and the heat source.  ? Leave the heat on for 20-30 minutes.  ? Remove the heat if your skin turns bright red. This is especially important if you are unable to feel pain, heat, or cold. You may have a greater risk of getting burned.  · Try soaking in a warm tub.  · Take over-the-counter and prescription medicines only as told by your health care provider.  · Keep all follow-up visits as told by your health care provider. This is important.  Contact a health care provider if:  · You have pain that is not relieved with rest or medicine.  Get help right away if:  · You have weakness or numbness in one or both of your legs or feet.  · You have trouble controlling your bladder or your bowels.  · You have nausea or vomiting.  · You have pain in your abdomen.  · You have shortness of breath or you faint.  This information is not intended to replace advice given to you by your health care provider. Make sure you discuss any questions you have with your health care provider.  Document Revised: 04/09/2020 Document Reviewed: 06/27/2018  [employer] Patient Education © 2020 [employer]    Suspect Essential HTN.Good BP control is encouraged with Goal BP based on JNC 8 guidelines 2014 <140/90 for patients with known cardiac disease and diabetes. (MARINA. 2014:322 (5):507-520. doi:10.1001/marina.2013.03381): general population <60 yr old goal BP <140/90 and for those >60 <150/90.  For patients of all ages with Diabetes, CKD, Known CAD <140/90. Recommended to the patient to obtain electronic home BP machine with upper arm blood pressure cuff and to check  regularly as instructed.  Keep BP log and bring to subsequent visits. Stable, at goal.  a. LABS: routine for hypertension recommended and ordered if necessary.  b. Recommend if you do not have a home BP machine to obtain an electronic machine with arm blood pressure cuff.      c. Monitor BP over the next week and keep log to bring back to office. Discussed medication therapy however pt wants to try to control with diet exercise. .  Your provider  has recommended self-monitoring of your blood pressure.  If you do not have a blood pressure cuff you may purchase one from the local pharmacy.  You may ask the pharmacist which brand and model they recommend.  Obtain your blood pressure measurement at least 2x per week.  You should also check your blood pressure if you experience any symptoms of blurred visit, dizziness or headache.  Please record all blood pressure measurements and bring them to next office visit.  If you have any questions about the accuracy of your blood pressure machine please bring it in to the office and our staff will be happy to check accuracy.   d. Encouraged to eat a low sodium heart healthy diet  e. Offered handout on HTN educational topics.  These were provided if patient requested these today.  f. MEDS: as listed in today's visit.  g. Risks/benefits of current and new medications discussed with the patient and or family today.  The patient/family are aware and accept that if there any side effects they should call or return to clinic as soon as possible.  Appropriate F/U discussed for topics addressed today. All questions were answered to the  satisfactory state of patient/family.  Should symptoms fail to improve or worsen they agree to call or return to clinic or to go to the ER. Education handouts were offered on any new Rx if requested.  Discussed the importance of following up with any needed screening tests/labs/specialist appointments and any requested follow-up recommended by me today.   Importance of maintaining follow-up discussed and patient accepts that missed appointments can delay diagnosis and potentially lead to worsening of conditions.

## 2021-06-18 DIAGNOSIS — M54.16 LUMBAR RADICULOPATHY: ICD-10-CM

## 2021-06-18 DIAGNOSIS — M54.50 LUMBAR BACK PAIN: ICD-10-CM

## 2021-06-23 ENCOUNTER — TELEPHONE (OUTPATIENT)
Dept: FAMILY MEDICINE CLINIC | Facility: CLINIC | Age: 66
End: 2021-06-23

## 2021-06-23 NOTE — TELEPHONE ENCOUNTER
Called patient and advised that provider has not released results at this time and once they are someone for our office will contact him with results. Patient voiced understanding.

## 2021-06-23 NOTE — TELEPHONE ENCOUNTER
PATIENTS WIFE CALLED IN REQUESTING A CALL BACK TO DISCUSS THE RESULTS OF THE XRAYS ON HIS BACK     PLEASE CALL BACK AND ADVISE  [phone]

## 2021-06-24 NOTE — TELEPHONE ENCOUNTER
Patient aware of Carol Ann's message and return OV has been schedule with Dr Leyva for possible MRI.

## 2021-06-24 NOTE — TELEPHONE ENCOUNTER
Xray was normal. If symptoms persists, I recommend a return visit with Dr. Leyva so that it can be documented and see if there is a need to proceed with an MRI.

## 2021-06-28 RX ORDER — PANTOPRAZOLE SODIUM 20 MG/1
TABLET, DELAYED RELEASE ORAL
Qty: 90 TABLET | Refills: 1 | Status: SHIPPED | OUTPATIENT
Start: 2021-06-28 | End: 2021-10-27

## 2021-07-07 ENCOUNTER — OFFICE VISIT (OUTPATIENT)
Dept: FAMILY MEDICINE CLINIC | Facility: CLINIC | Age: 66
End: 2021-07-07

## 2021-07-07 VITALS
DIASTOLIC BLOOD PRESSURE: 66 MMHG | SYSTOLIC BLOOD PRESSURE: 141 MMHG | WEIGHT: 229.6 LBS | TEMPERATURE: 97.7 F | BODY MASS INDEX: 28.7 KG/M2 | HEART RATE: 79 BPM | OXYGEN SATURATION: 98 %

## 2021-07-07 DIAGNOSIS — M54.50 LUMBAR BACK PAIN: Primary | ICD-10-CM

## 2021-07-07 DIAGNOSIS — M54.16 LUMBAR RADICULOPATHY: ICD-10-CM

## 2021-07-07 DIAGNOSIS — M62.830 LUMBAR PARASPINAL MUSCLE SPASM: ICD-10-CM

## 2021-07-07 DIAGNOSIS — I10 ESSENTIAL HYPERTENSION: ICD-10-CM

## 2021-07-07 PROCEDURE — 99214 OFFICE O/P EST MOD 30 MIN: CPT | Performed by: FAMILY MEDICINE

## 2021-07-07 PROCEDURE — 96372 THER/PROPH/DIAG INJ SC/IM: CPT | Performed by: FAMILY MEDICINE

## 2021-07-07 RX ORDER — METHYLPREDNISOLONE ACETATE 40 MG/ML
40 INJECTION, SUSPENSION INTRA-ARTICULAR; INTRALESIONAL; INTRAMUSCULAR; SOFT TISSUE ONCE
Status: COMPLETED | OUTPATIENT
Start: 2021-07-07 | End: 2021-07-07

## 2021-07-07 RX ADMIN — METHYLPREDNISOLONE ACETATE 40 MG: 40 INJECTION, SUSPENSION INTRA-ARTICULAR; INTRALESIONAL; INTRAMUSCULAR; SOFT TISSUE at 11:37

## 2021-07-07 NOTE — PROGRESS NOTES
OFFICE VISIT NOTE:    Rupa Verma is a 66 y.o. male who presents today for Results (Discuss xrays ).     XRays of the lumbar spine in May at [facility] [place] were normal - nothing acute.     Back Pain  This is a chronic problem. The current episode started more than 1 year ago. The problem occurs constantly. The problem is unchanged. The pain is present in the sacro-iliac and lumbar spine. The quality of the pain is described as cramping and aching. The pain does not radiate. The pain is severe. The pain is worse during the day. The symptoms are aggravated by bending and twisting. Stiffness is present in the morning. Pertinent negatives include no abdominal pain, chest pain, fever or weight loss. He has tried analgesics, bed rest and home exercises for the symptoms. The treatment provided significant relief.        Past medical/surgical history, Family history, Social history, Allergies and Medications have been reviewed with the patient today and are updated in [facility] EMR. See below.  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   • Left upper lobe pulmonary nodule 9/12/2019   • Obstructive sleep apnea 12/19/2018     Past Surgical History:   Procedure Laterality Date   • CHOLECYSTECTOMY     • COLONOSCOPY     • ENDOSCOPY     • PATELLA FRACTURE SURGERY     • TONSILLECTOMY       Family History   Problem Relation Age of Onset   • Hypertension Mother    • No Known Problems Father    • Diabetes Neg Hx    • Cancer Neg Hx    • Stroke Neg Hx      Social History     Tobacco Use   • Smoking status: Current Every Day Smoker     Packs/day: 1.00     Years: 50.00     Pack years: 50.00     Types: Cigarettes   • Smokeless tobacco: Never Used   Substance Use Topics   • Alcohol use: Yes     Comment: occ   • Drug use: No       ALLERGIES:  Patient has no known allergies.    CURRENT MEDS:    Current Outpatient Medications:   •  amLODIPine (NORVASC) 5 MG tablet, TAKE ONE TABLET BY MOUTH EVERY  DAY, Disp: 90 tablet, Rfl: 1  •  aspirin 81 MG tablet, aspirin 81 mg tablet,delayed release, Disp: , Rfl:   •  citalopram (CeleXA) 20 MG tablet, TAKE ONE TABLET BY MOUTH EVERY DAY, Disp: 90 tablet, Rfl: 0  •  metoprolol tartrate (LOPRESSOR) 50 MG tablet, TAKE ONE TABLET BY MOUTH TWICE A DAY, Disp: 180 tablet, Rfl: 0  •  pantoprazole (PROTONIX) 20 MG EC tablet, TAKE ONE TABLET BY MOUTH EVERY DAY, Disp: 90 tablet, Rfl: 1  •  simvastatin (ZOCOR) 20 MG tablet, TAKE ONE TABLET BY MOUTH EVERY DAY, Disp: 90 tablet, Rfl: 0  •  cyclobenzaprine (FLEXERIL) 10 MG tablet, Take 1 tablet by mouth 2 (Two) Times a Day As Needed for Muscle Spasms., Disp: 30 tablet, Rfl: 1  •  TiZANidine (ZANAFLEX) 4 MG capsule, Take 1 capsule by mouth 3 (Three) Times a Day As Needed for Muscle Spasms., Disp: 30 capsule, Rfl: 0    Current Facility-Administered Medications:   •  methylPREDNISolone acetate (DEPO-medrol) injection 40 mg, 40 mg, Intramuscular, Once, Jose Leyva MD    REVIEW OF SYSTEMS:  Review of Systems   Constitutional: Negative for activity change, appetite change, fatigue, fever, unexpected weight gain and unexpected weight loss.   Respiratory: Negative for shortness of breath.    Cardiovascular: Negative for chest pain.   Gastrointestinal: Negative for abdominal pain.   Genitourinary: Negative for difficulty urinating.   Musculoskeletal: Positive for back pain.   Skin: Negative for rash.   Neurological: Negative for syncope and headache.       PHYSICAL EXAMINATION:  Vital Signs:  /66 (BP Location: Right arm, Patient Position: Sitting, Cuff Size: Large Adult)   Pulse 79   Temp 97.7 °F (36.5 °C) (Infrared)   Wt 104 kg (229 lb 9.6 oz)   SpO2 98%   BMI 28.70 kg/m²   Vitals:    07/07/21 1110   Patient Position: Sitting       Physical Exam  Vitals and nursing note reviewed.   Constitutional:       General: He is not in acute distress.     Appearance: He is well-developed.   HENT:      Head: Normocephalic and atraumatic.       Mouth/Throat:      Mouth: Mucous membranes are moist.      Pharynx: Oropharynx is clear.   Eyes:      Extraocular Movements: Extraocular movements intact.      Pupils: Pupils are equal, round, and reactive to light.   Neck:      Vascular: No JVD.   Cardiovascular:      Rate and Rhythm: Normal rate and regular rhythm.      Pulses: Normal pulses.      Heart sounds: Normal heart sounds.   Pulmonary:      Effort: Pulmonary effort is normal. No respiratory distress.      Breath sounds: Normal breath sounds.   Musculoskeletal:         General: Normal range of motion.      Cervical back: Normal range of motion and neck supple.   Skin:     General: Skin is warm and dry.      Capillary Refill: Capillary refill takes less than 2 seconds.      Findings: No rash.   Neurological:      General: No focal deficit present.      Mental Status: He is alert and oriented to person, place, and time.      Cranial Nerves: No cranial nerve deficit.   Psychiatric:         Mood and Affect: Mood normal.         Behavior: Behavior normal.         Procedures    ASSESSMENT/ PLAN:  Problem List Items Addressed This Visit        Cardiac and Vasculature    Essential hypertension       Musculoskeletal and Injuries    Lumbar back pain - Primary    Relevant Medications    methylPREDNISolone acetate (DEPO-medrol) injection 40 mg (Start on 7/7/2021 12:05 PM)      Other Visit Diagnoses     Lumbar radiculopathy        Relevant Medications    methylPREDNISolone acetate (DEPO-medrol) injection 40 mg (Start on 7/7/2021 12:05 PM)    Lumbar paraspinal muscle spasm        Relevant Medications    methylPREDNISolone acetate (DEPO-medrol) injection 40 mg (Start on 7/7/2021 12:05 PM)            Specific Patient Instructions:  MEDICATION Instructions: Encouraged patient to continue routine medicines as prescribed and maintain compliance. Patient instructed to report any adverse side effects or reactions to medicines promptly to the office. Patient instructed to  make us aware of any OTC or herbal meds or supplement use.    DIET Recommendations: Patient instructed and provided information on the following nutrition and diet recommendations: Calorie restriction for weight reduction and maintenance. Necessity for adequate daily intake of fluids/water. Also, diet information provided in AVS for specifics.    EXERCISE Instructions: Discussed with patient the need for routine aerobic activity for cardiovascular fitness, 3 times a week for about 30 minutes. Daily exercise for increased fitness and weight reduction goals.    SMOKING Recommendations: Counseled patient and encouraged them on smoking and tobacco cessation if applicable. Discussed the benefits to all body systems with smoking/tobacco cessation, including decreased cardiac/lung/stroke/cancer risk. Encouraged no vaping use.    HEALTH MAINTENANCE:  Counseling provided to patient/family about routine health maintenance and ANNUAL physicals/labs. Counseling on recommended Vaccinations appropriate for age needed.        Patient's Body mass index is 28.7 kg/m². indicating that he is overweight (BMI 25-29.9). Obesity-related health conditions include the following: hypertension and osteoarthritis. Obesity is unchanged. BMI is is above average; BMI management plan is completed. We discussed portion control and increasing exercise..      Medications or Orders placed this visit:  No orders of the defined types were placed in this encounter.      Medications DISCONTINUED this visit:  There are no discontinued medications.    FOLLOW-UP:  Return in about 6 months (around 1/7/2022) for Recheck, Next scheduled follow up.    I discussed the patients findings and my recommendations with patient.  An After Visit Summary (AVS) was printed and given to the patient at discharge.        Jose Leyva MD, FAAFP  7/7/2021

## 2021-07-07 NOTE — PATIENT INSTRUCTIONS
"https://doi.[email or web address]">   Chronic Back Pain  When back pain lasts longer than 3 months, it is called chronic back pain. The cause of your back pain may not be known. Some common causes include:  · Wear and tear (degenerative disease) of the bones, ligaments, or disks in your back.  · Inflammation and stiffness in your back (arthritis).  People who have chronic back pain often go through certain periods in which the pain is more intense (flare-ups). Many people can learn to manage the pain with home care.  Follow these instructions at home:  Pay attention to any changes in your symptoms. Take these actions to help with your pain:  Managing pain and stiffness         · If directed, apply ice to the painful area. Your health care provider may recommend applying ice during the first 24-48 hours after a flare-up begins. To do this:  ? Put ice in a plastic bag.  ? Place a towel between your skin and the bag.  ? Leave the ice on for 20 minutes, 2-3 times per day.  · If directed, apply heat to the affected area as often as told by your health care provider. Use the heat source that your health care provider recommends, such as a moist heat pack or a heating pad.  ? Place a towel between your skin and the heat source.  ? Leave the heat on for 20-30 minutes.  ? Remove the heat if your skin turns bright red. This is especially important if you are unable to feel pain, heat, or cold. You may have a greater risk of getting burned.  · Try soaking in a warm tub.  Activity    · Avoid bending and other activities that make the problem worse.  · Maintain a proper position when standing or sitting:  ? When standing, keep your upper back and neck straight, with your shoulders pulled back. Avoid slouching.  ? When sitting, keep your back straight and relax your shoulders. Do not round your shoulders or pull them backward.  · Do not sit or  one place for long periods of time.  · Take brief periods of rest " throughout the day. This will reduce your pain. Resting in a lying or standing position is usually better than sitting to rest.  · When you are resting for longer periods, mix in some mild activity or stretching between periods of rest. This will help to prevent stiffness and pain.  · Get regular exercise. Ask your health care provider what activities are safe for you.  · Do not lift anything that is heavier than 10 lb (4.5 kg), or the limit that you are told, until your health care provider says that it is safe. Always use proper lifting technique, which includes:  ? Bending your knees.  ? Keeping the load close to your body.  ? Avoiding twisting.  · Sleep on a firm mattress in a comfortable position. Try lying on your side with your knees slightly bent. If you lie on your back, put a pillow under your knees.  Medicines  · Treatment may include medicines for pain and inflammation taken by mouth or applied to the skin, prescription pain medicine, or muscle relaxants. Take over-the-counter and prescription medicines only as told by your health care provider.  · Ask your health care provider if the medicine prescribed to you:  ? Requires you to avoid driving or using machinery.  ? Can cause constipation. You may need to take these actions to prevent or treat constipation:  § Drink enough fluid to keep your urine pale yellow.  § Take over-the-counter or prescription medicines.  § Eat foods that are high in fiber, such as beans, whole grains, and fresh fruits and vegetables.  § Limit foods that are high in fat and processed sugars, such as fried or sweet foods.  General instructions  · Do not use any products that contain nicotine or tobacco, such as cigarettes, e-cigarettes, and chewing tobacco. If you need help quitting, ask your health care provider.  · Keep all follow-up visits as told by your health care provider. This is important.  Contact a health care provider if:  · You have pain that is not relieved with rest  or medicine.  · Your pain gets worse, or you have new pain.  · You have a high fever.  · You have rapid weight loss.  · You have trouble doing your normal activities.  Get help right away if:  · You have weakness or numbness in one or both of your legs or feet.  · You have trouble controlling your bladder or your bowels.  · You have severe back pain and have any of the following:  ? Nausea or vomiting.  ? Pain in your abdomen.  ? Shortness of breath or you faint.  Summary  · Chronic back pain is back pain that lasts longer than 3 months.  · When a flare-up begins, apply ice to the painful area for the first 24-48 hours.  · Apply a moist heat pad or use a heating pad on the painful area as directed by your health care provider.  · When you are resting for longer periods, mix in some mild activity or stretching between periods of rest. This will help to prevent stiffness and pain.  This information is not intended to replace advice given to you by your health care provider. Make sure you discuss any questions you have with your health care provider.  Document Revised: 01/27/2021 Document Reviewed: 01/27/2021  [employer] Patient Education © 2021 [employer]    Suspect Essential HTN.Good BP control is encouraged with Goal BP based on JNC 8 guidelines 2014 <140/90 for patients with known cardiac disease and diabetes. (MARINA. 2014:322 (5):507-520. doi:10.1001/marina.2013.63249): general population <60 yr old goal BP <140/90 and for those >60 <150/90.  For patients of all ages with Diabetes, CKD, Known CAD <140/90. Recommended to the patient to obtain electronic home BP machine with upper arm blood pressure cuff and to check regularly as instructed.  Keep BP log and bring to subsequent visits. Stable, at goal.  a. LABS: routine for hypertension recommended and ordered if necessary.  b. Recommend if you do not have a home BP machine to obtain an electronic machine with arm blood pressure cuff.      c. Monitor BP over the next  week and keep log to bring back to office. Discussed medication therapy however pt wants to try to control with diet exercise. .  Your provider  has recommended self-monitoring of your blood pressure.  If you do not have a blood pressure cuff you may purchase one from the local pharmacy.  You may ask the pharmacist which brand and model they recommend.  Obtain your blood pressure measurement at least 2x per week.  You should also check your blood pressure if you experience any symptoms of blurred visit, dizziness or headache.  Please record all blood pressure measurements and bring them to next office visit.  If you have any questions about the accuracy of your blood pressure machine please bring it in to the office and our staff will be happy to check accuracy.   d. Encouraged to eat a low sodium heart healthy diet  e. Offered handout on HTN educational topics.  These were provided if patient requested these today.  f. MEDS: as listed in today's visit.  g. Risks/benefits of current and new medications discussed with the patient and or family today.  The patient/family are aware and accept that if there any side effects they should call or return to clinic as soon as possible.  Appropriate F/U discussed for topics addressed today. All questions were answered to the  satisfactory state of patient/family.  Should symptoms fail to improve or worsen they agree to call or return to clinic or to go to the ER. Education handouts were offered on any new Rx if requested.  Discussed the importance of following up with any needed screening tests/labs/specialist appointments and any requested follow-up recommended by me today.  Importance of maintaining follow-up discussed and patient accepts that missed appointments can delay diagnosis and potentially lead to worsening of conditions.

## 2021-07-16 NOTE — PROGRESS NOTES
"Background:  pt with hx mild copd and roxy nodule 2.5 cm identified 2012, tuberculosis identified on bronchoscopy 7/2012.  New nodule identified 12/2018, some additional small nodules present on follow up 10/2019.    hx smoking.   Chief Complaint  left upper lobe pulmonary nodule    Subjective    History of Present Illness       Rupa Verma presents to [facility].  No exacerbations or flare ups.  Not requiring inhalers.  No fever.  Has deferred corona vaccine.       Objective     Vital Signs:   /76   Pulse 78   Ht 190.5 cm (75\")   Wt 97.1 kg (214 lb)   SpO2 96% Comment: RA  BMI 26.75 kg/m²   Physical Exam  Constitutional:       Appearance: Normal appearance. He is not ill-appearing or diaphoretic.   Eyes:      Extraocular Movements: Extraocular movements intact.   Pulmonary:      Effort: Pulmonary effort is normal. No respiratory distress.      Breath sounds: No wheezing, rhonchi or rales.   Skin:     Findings: No erythema or rash.   Neurological:      Mental Status: He is alert.        Result Review  Data Reviewed:{ Labs  Result Review  Imaging  Media :23}     CT Chest Without Contrast (11/16/2020 11:55)  My interpretation of radiograph: scar focus ROXY, small nodules rul peripherally.                 Assessment and Plan  {CC Problem List  Visit Diagnosis  ROS  Review (Popup)  Health Maintenance  Quality  BestPractice  Medications  SmartSets  SnapShot Encounters  Media :23}   Problem List Items Addressed This Visit        Pulmonary Problems    Panlobular emphysema (CMS/HCC) - Primary    Relevant Orders    CT Chest Without Contrast Diagnostic       Other    History of primary tuberculosis    Relevant Orders    CT Chest Without Contrast Diagnostic      Other Visit Diagnoses     Right upper lobe pulmonary nodule        Relevant Orders    CT Chest Without Contrast Diagnostic    Pulmonary fibrosis (CMS/HCC)        Relevant Orders    CT Chest " Without Contrast Diagnostic          Follow Up {Instructions Charge Capture  Follow-up Communications :23}   Return in about 6 months (around 1/19/2022) for review CT.  Patient was given instructions and counseling regarding his condition or for health maintenance advice. Please see specific information pulled into the AVS if appropriate.    Electronically signed by Rj Fritz MD, 7/19/2021, 09:34 CDT

## 2021-07-19 ENCOUNTER — OFFICE VISIT (OUTPATIENT)
Dept: PULMONOLOGY | Facility: CLINIC | Age: 66
End: 2021-07-19

## 2021-07-19 VITALS
HEIGHT: 75 IN | OXYGEN SATURATION: 96 % | SYSTOLIC BLOOD PRESSURE: 138 MMHG | BODY MASS INDEX: 26.61 KG/M2 | DIASTOLIC BLOOD PRESSURE: 76 MMHG | HEART RATE: 78 BPM | WEIGHT: 214 LBS

## 2021-07-19 DIAGNOSIS — R91.1 RIGHT UPPER LOBE PULMONARY NODULE: ICD-10-CM

## 2021-07-19 DIAGNOSIS — J43.1 PANLOBULAR EMPHYSEMA (HCC): Primary | ICD-10-CM

## 2021-07-19 DIAGNOSIS — Z86.11: ICD-10-CM

## 2021-07-19 DIAGNOSIS — J84.10 PULMONARY FIBROSIS (HCC): ICD-10-CM

## 2021-07-19 PROCEDURE — 99213 OFFICE O/P EST LOW 20 MIN: CPT | Performed by: INTERNAL MEDICINE

## 2021-07-29 DIAGNOSIS — F32.89 OTHER DEPRESSION: ICD-10-CM

## 2021-07-29 DIAGNOSIS — E78.5 HYPERLIPIDEMIA LDL GOAL <100: ICD-10-CM

## 2021-07-29 DIAGNOSIS — I10 ESSENTIAL HYPERTENSION: ICD-10-CM

## 2021-07-30 RX ORDER — CITALOPRAM 20 MG/1
TABLET ORAL
Qty: 90 TABLET | Refills: 0 | Status: SHIPPED | OUTPATIENT
Start: 2021-07-30 | End: 2021-10-27

## 2021-07-30 RX ORDER — SIMVASTATIN 20 MG
TABLET ORAL
Qty: 90 TABLET | Refills: 0 | Status: SHIPPED | OUTPATIENT
Start: 2021-07-30 | End: 2021-10-27

## 2021-07-30 RX ORDER — AMLODIPINE BESYLATE 5 MG/1
TABLET ORAL
Qty: 90 TABLET | Refills: 1 | Status: SHIPPED | OUTPATIENT
Start: 2021-07-30 | End: 2022-01-28

## 2021-07-30 RX ORDER — METOPROLOL TARTRATE 50 MG/1
TABLET, FILM COATED ORAL
Qty: 180 TABLET | Refills: 0 | Status: SHIPPED | OUTPATIENT
Start: 2021-07-30 | End: 2021-10-27

## 2021-08-19 ENCOUNTER — OFFICE VISIT (OUTPATIENT)
Dept: FAMILY MEDICINE CLINIC | Facility: CLINIC | Age: 66
End: 2021-08-19

## 2021-08-19 VITALS
SYSTOLIC BLOOD PRESSURE: 133 MMHG | BODY MASS INDEX: 28.89 KG/M2 | OXYGEN SATURATION: 96 % | HEIGHT: 75 IN | WEIGHT: 232.4 LBS | TEMPERATURE: 98 F | DIASTOLIC BLOOD PRESSURE: 67 MMHG | HEART RATE: 81 BPM

## 2021-08-19 DIAGNOSIS — M54.50 LUMBAR BACK PAIN: Primary | ICD-10-CM

## 2021-08-19 DIAGNOSIS — M54.16 LUMBAR RADICULOPATHY: ICD-10-CM

## 2021-08-19 DIAGNOSIS — M62.830 LUMBAR PARASPINAL MUSCLE SPASM: ICD-10-CM

## 2021-08-19 PROCEDURE — 99213 OFFICE O/P EST LOW 20 MIN: CPT | Performed by: NURSE PRACTITIONER

## 2021-08-19 RX ORDER — MOXIFLOXACIN 5 MG/ML
SOLUTION/ DROPS OPHTHALMIC 4 TIMES DAILY
COMMUNITY
Start: 2021-07-26 | End: 2021-11-02

## 2021-08-19 RX ORDER — MELOXICAM 15 MG/1
15 TABLET ORAL DAILY
Qty: 90 TABLET | Refills: 1 | Status: SHIPPED | OUTPATIENT
Start: 2021-08-19 | End: 2021-11-02

## 2021-08-19 NOTE — PROGRESS NOTES
"Chief Complaint  Flank Pain (Right side/ Pt states he hurt for a month but pain is gone now)    Subjective          Rupa Veram presents to [facility]  History of Present Illness  Pain was present in his right lower quadrant for \"over a month but it's gone now.\"  There was no precipitating factor for the pain.  It did not worsen with food.  No change in bowel habits.  He was unable to work or lift things during the period however due to pain.  He feels that it \"might be coming from my back.\"    Objective   Vital Signs:   /67 (BP Location: Right arm, Patient Position: Sitting, Cuff Size: Large Adult)   Pulse 81   Temp 98 °F (36.7 °C) (Infrared)   Ht 190.5 cm (75\")   Wt 105 kg (232 lb 6.4 oz)   SpO2 96%   BMI 29.05 kg/m²       Physical Exam  Vitals and nursing note reviewed.   Constitutional:       General: He is not in acute distress.     Appearance: He is well-developed. He is not diaphoretic.   HENT:      Head: Normocephalic and atraumatic.   Cardiovascular:      Rate and Rhythm: Normal rate and regular rhythm.      Heart sounds: Normal heart sounds. No murmur heard.     Pulmonary:      Effort: Pulmonary effort is normal. No respiratory distress.      Breath sounds: Normal breath sounds.   Abdominal:      General: Bowel sounds are normal.      Palpations: Abdomen is soft.      Tenderness: There is no abdominal tenderness.   Musculoskeletal:         General: Tenderness present. Normal range of motion.      Cervical back: Normal range of motion and neck supple.      Comments: Midline lumbar tenderness as well as tenderness of the right lumbar paraspinal muscle group.   Skin:     General: Skin is warm and dry.      Capillary Refill: Capillary refill takes less than 2 seconds.      Findings: No erythema.   Neurological:      Mental Status: He is alert and oriented to person, place, and time.   Psychiatric:         Mood and Affect: Mood normal.         Thought Content: " Thought content normal.        Result Review :                 Assessment and Plan    Diagnoses and all orders for this visit:    1. Lumbar back pain (Primary)  -     meloxicam (MOBIC) 15 MG tablet; Take 1 tablet by mouth Daily.  Dispense: 90 tablet; Refill: 1    2. Lumbar radiculopathy  -     meloxicam (MOBIC) 15 MG tablet; Take 1 tablet by mouth Daily.  Dispense: 90 tablet; Refill: 1    3. Lumbar paraspinal muscle spasm  -     meloxicam (MOBIC) 15 MG tablet; Take 1 tablet by mouth Daily.  Dispense: 90 tablet; Refill: 1        Follow Up   Return for keep scheduled appt.  Patient was given instructions and counseling regarding his condition or for health maintenance advice. Please see specific information pulled into the AVS if appropriate.

## 2021-10-22 ENCOUNTER — TELEPHONE (OUTPATIENT)
Dept: FAMILY MEDICINE CLINIC | Facility: CLINIC | Age: 66
End: 2021-10-22

## 2021-10-22 NOTE — TELEPHONE ENCOUNTER
PT CALLED REGARDING HIS MELOXICAM, STATES HE HAS BEEN DOUBLING UP ON THEM AND WAS ADVISED BY HIS PHARMACY TO CALL AND ASK JUAN CASTRO WHETHER THAT WAS OKAY OR IF HIS PRESCRIPTION NEEDED TO BE ADJUSTED    CALLBACK [phone]

## 2021-10-22 NOTE — TELEPHONE ENCOUNTER
Called patient and told him providers message. Pt voiced understanding and I made appt for pt to discuss medications.

## 2021-10-22 NOTE — TELEPHONE ENCOUNTER
Patient CAN NOT take 2 of them daily.  This will cause kidney damage.  One daily is the MAXIMUM dosage.  If patient wishes to try a different medication, I recommend that he come in for an office visit.

## 2021-10-26 DIAGNOSIS — E78.5 HYPERLIPIDEMIA LDL GOAL <100: ICD-10-CM

## 2021-10-26 DIAGNOSIS — I10 ESSENTIAL HYPERTENSION: ICD-10-CM

## 2021-10-26 DIAGNOSIS — F32.89 OTHER DEPRESSION: ICD-10-CM

## 2021-10-27 RX ORDER — METOPROLOL TARTRATE 50 MG/1
TABLET, FILM COATED ORAL
Qty: 180 TABLET | Refills: 0 | Status: SHIPPED | OUTPATIENT
Start: 2021-10-27 | End: 2022-01-28

## 2021-10-27 RX ORDER — PANTOPRAZOLE SODIUM 20 MG/1
TABLET, DELAYED RELEASE ORAL
Qty: 90 TABLET | Refills: 1 | Status: SHIPPED | OUTPATIENT
Start: 2021-10-27 | End: 2022-04-26

## 2021-10-27 RX ORDER — CITALOPRAM 20 MG/1
TABLET ORAL
Qty: 90 TABLET | Refills: 0 | Status: SHIPPED | OUTPATIENT
Start: 2021-10-27 | End: 2022-01-28

## 2021-10-27 RX ORDER — SIMVASTATIN 20 MG
TABLET ORAL
Qty: 90 TABLET | Refills: 0 | Status: SHIPPED | OUTPATIENT
Start: 2021-10-27 | End: 2022-01-28

## 2021-11-02 ENCOUNTER — OFFICE VISIT (OUTPATIENT)
Dept: FAMILY MEDICINE CLINIC | Facility: CLINIC | Age: 66
End: 2021-11-02

## 2021-11-02 VITALS
TEMPERATURE: 98 F | WEIGHT: 236.6 LBS | DIASTOLIC BLOOD PRESSURE: 67 MMHG | SYSTOLIC BLOOD PRESSURE: 136 MMHG | HEART RATE: 73 BPM | HEIGHT: 74 IN | BODY MASS INDEX: 30.36 KG/M2 | OXYGEN SATURATION: 96 %

## 2021-11-02 DIAGNOSIS — M54.16 LUMBAR RADICULOPATHY: Primary | ICD-10-CM

## 2021-11-02 DIAGNOSIS — M79.2 NEUROPATHIC PAIN: ICD-10-CM

## 2021-11-02 PROCEDURE — 99213 OFFICE O/P EST LOW 20 MIN: CPT | Performed by: NURSE PRACTITIONER

## 2021-11-02 NOTE — PROGRESS NOTES
"Chief Complaint  Back Pain (pt wants referral)    Subjective          Rupa Verma presents to [facility]  History of Present Illness  Patient states he used to see Dr. Hoskins in [place] for cervical neuropathic pain and he is requesting a referral for neuropathic pain of his lower extremities.  He denies any change in his symptoms.  He was previously treated by Pain Management in [place] (Dr. Larsen) for treatment with Norco.  He has not been seen by them since February 2021.  Objective   Vital Signs:   /67 (BP Location: Right arm, Patient Position: Sitting, Cuff Size: Large Adult)   Pulse 73   Temp 98 °F (36.7 °C)   Ht 188 cm (74\") Comment: pt reported  Wt 107 kg (236 lb 9.6 oz)   SpO2 96%   BMI 30.38 kg/m²     Physical Exam  Vitals and nursing note reviewed.   Constitutional:       General: He is not in acute distress.     Appearance: Normal appearance. He is not ill-appearing.   Cardiovascular:      Rate and Rhythm: Normal rate and regular rhythm.      Pulses: Normal pulses.      Heart sounds: Normal heart sounds. No murmur heard.      Pulmonary:      Effort: Pulmonary effort is normal.      Breath sounds: Normal breath sounds.      Comments: Diminished in bases bilaterally.  Musculoskeletal:         General: Tenderness present. Normal range of motion.      Comments: Lumbar tenderness.  Lumbar and bilateral hip pain with straight leg raise bilaterally at 45 degrees.   Skin:     General: Skin is warm and dry.   Neurological:      Mental Status: He is alert.   Psychiatric:         Thought Content: Thought content normal.        Result Review :                 Assessment and Plan    Diagnoses and all orders for this visit:    1. Lumbar radiculopathy (Primary)  -     Ambulatory Referral to Neurology    2. Neuropathic pain  -     Ambulatory Referral to Neurology        Follow Up   Return in about 7 weeks (around 12/22/2021) for Medicare Wellness with labs prior " (12/15 or after).  Patient was given instructions and counseling regarding his condition or for health maintenance advice. Please see specific information pulled into the AVS if appropriate.

## 2021-12-13 DIAGNOSIS — Z12.5 SPECIAL SCREENING FOR MALIGNANT NEOPLASM OF PROSTATE: ICD-10-CM

## 2021-12-13 DIAGNOSIS — I10 ESSENTIAL HYPERTENSION: Primary | ICD-10-CM

## 2021-12-13 DIAGNOSIS — R53.83 FATIGUE, UNSPECIFIED TYPE: ICD-10-CM

## 2021-12-13 DIAGNOSIS — Z00.00 MEDICARE ANNUAL WELLNESS VISIT, SUBSEQUENT: ICD-10-CM

## 2021-12-27 ENCOUNTER — CLINICAL SUPPORT (OUTPATIENT)
Dept: FAMILY MEDICINE CLINIC | Facility: CLINIC | Age: 66
End: 2021-12-27

## 2021-12-28 ENCOUNTER — OFFICE VISIT (OUTPATIENT)
Dept: FAMILY MEDICINE CLINIC | Facility: CLINIC | Age: 66
End: 2021-12-28

## 2021-12-28 VITALS
BODY MASS INDEX: 30.57 KG/M2 | TEMPERATURE: 97.9 F | SYSTOLIC BLOOD PRESSURE: 132 MMHG | HEART RATE: 65 BPM | DIASTOLIC BLOOD PRESSURE: 60 MMHG | WEIGHT: 238.2 LBS | OXYGEN SATURATION: 99 % | HEIGHT: 74 IN

## 2021-12-28 DIAGNOSIS — R73.03 PRE-DIABETES: ICD-10-CM

## 2021-12-28 DIAGNOSIS — Z00.00 MEDICARE ANNUAL WELLNESS VISIT, INITIAL: Primary | ICD-10-CM

## 2021-12-28 DIAGNOSIS — E66.9 OBESITY (BMI 30.0-34.9): ICD-10-CM

## 2021-12-28 DIAGNOSIS — R73.9 HYPERGLYCEMIA: ICD-10-CM

## 2021-12-28 DIAGNOSIS — M54.50 LUMBAR BACK PAIN: ICD-10-CM

## 2021-12-28 DIAGNOSIS — M54.16 LUMBAR RADICULOPATHY: ICD-10-CM

## 2021-12-28 DIAGNOSIS — M79.2 NEUROPATHIC PAIN: ICD-10-CM

## 2021-12-28 DIAGNOSIS — I10 ESSENTIAL HYPERTENSION: ICD-10-CM

## 2021-12-28 DIAGNOSIS — E78.1 HYPERTRIGLYCERIDEMIA: ICD-10-CM

## 2021-12-28 LAB
EXPIRATION DATE: ABNORMAL
HBA1C MFR BLD: 6.3 %
Lab: ABNORMAL

## 2021-12-28 PROCEDURE — 1160F RVW MEDS BY RX/DR IN RCRD: CPT | Performed by: NURSE PRACTITIONER

## 2021-12-28 PROCEDURE — 1125F AMNT PAIN NOTED PAIN PRSNT: CPT | Performed by: NURSE PRACTITIONER

## 2021-12-28 PROCEDURE — G0402 INITIAL PREVENTIVE EXAM: HCPCS | Performed by: NURSE PRACTITIONER

## 2021-12-28 PROCEDURE — 83036 HEMOGLOBIN GLYCOSYLATED A1C: CPT | Performed by: NURSE PRACTITIONER

## 2021-12-28 PROCEDURE — 1170F FXNL STATUS ASSESSED: CPT | Performed by: NURSE PRACTITIONER

## 2021-12-28 PROCEDURE — 3044F HG A1C LEVEL LT 7.0%: CPT | Performed by: NURSE PRACTITIONER

## 2021-12-28 RX ORDER — NAPROXEN 500 MG/1
1 TABLET ORAL 2 TIMES DAILY WITH MEALS
COMMUNITY
Start: 2021-12-23 | End: 2022-06-29

## 2021-12-28 RX ORDER — GABAPENTIN 300 MG/1
1 CAPSULE ORAL 3 TIMES DAILY
COMMUNITY
Start: 2021-12-23 | End: 2022-04-13

## 2021-12-28 RX ORDER — METHOCARBAMOL 500 MG/1
1 TABLET, FILM COATED ORAL 3 TIMES DAILY
COMMUNITY
Start: 2021-12-23 | End: 2022-04-13

## 2021-12-28 NOTE — PROGRESS NOTES
The ABCs of the Annual Wellness Visit  Initial Medicare Wellness Visit    Chief Complaint   Patient presents with   • Medicare Wellness-Initial Visit     Subjective   History of Present Illness:  Rupa Verma is a 66 y.o. male who presents for an Initial Medicare Wellness Visit.    No current complaints.    The following portions of the patient's history were reviewed and   updated as appropriate: allergies, current medications, past family history, past medical history, past social history, past surgical history and problem list.     Compared to one year ago, the patient feels his physical   health is worse.    Compared to one year ago, the patient feels his mental   health is the same.    Recent Hospitalizations:  He was not admitted to the hospital during the last year.       Current Medical Providers:  Patient Care Team:  Jose Leyva MD as PCP - General (Family Medicine)  Laureano Weathers MD as Consulting Physician (Pulmonary Disease)  Rj Fritz MD as Consulting Physician (Pulmonary Disease)    Outpatient Medications Prior to Visit   Medication Sig Dispense Refill   • amLODIPine (NORVASC) 5 MG tablet TAKE ONE TABLET BY MOUTH EVERY DAY 90 tablet 1   • aspirin 81 MG tablet aspirin 81 mg tablet,delayed release     • citalopram (CeleXA) 20 MG tablet TAKE ONE TABLET BY MOUTH EVERY DAY 90 tablet 0   • gabapentin (NEURONTIN) 300 MG capsule Take 1 capsule by mouth 3 (Three) Times a Day.     • methocarbamol (ROBAXIN) 500 MG tablet Take 1 tablet by mouth 3 (Three) Times a Day.     • metoprolol tartrate (LOPRESSOR) 50 MG tablet TAKE ONE TABLET BY MOUTH TWICE A  tablet 0   • naproxen (NAPROSYN) 500 MG tablet Take 1 tablet by mouth 3 (Three) Times a Day.     • pantoprazole (PROTONIX) 20 MG EC tablet TAKE ONE TABLET BY MOUTH EVERY DAY 90 tablet 1   • simvastatin (ZOCOR) 20 MG tablet TAKE ONE TABLET BY MOUTH EVERY DAY 90 tablet 0     No facility-administered medications prior to visit.  "      No opioid medication identified on active medication list. I have reviewed chart for other potential  high risk medication/s and harmful drug interactions in the elderly.          Aspirin is on active medication list. Aspirin use is indicated based on review of current medical condition/s. Pros and cons of this therapy have been discussed today. Benefits of this medication outweigh potential harm.  Patient has been encouraged to continue taking this medication.  .      Patient Active Problem List   Diagnosis   • Lumbar back pain   • Gastroesophageal reflux disease without esophagitis   • Essential hypertension   • Hyperlipidemia LDL goal <100   • Left upper lobe pulmonary nodule   • History of primary tuberculosis   • Personal history of nicotine dependence   • Panlobular emphysema (HCC)   • Cervical paraspinal muscle spasm   • Annual physical exam   • Obstructive sleep apnea     Advance Care Planning  Advance Directive is not on file.  ACP discussion was declined by the patient. Patient does not have an advance directive, declines further assistance.    Review of Systems   Constitutional: Negative.    HENT: Negative.    Respiratory: Negative.  Negative for cough and shortness of breath.    Cardiovascular: Negative.  Negative for chest pain and leg swelling.   Gastrointestinal: Negative.    Genitourinary: Negative.    Musculoskeletal: Positive for arthralgias, back pain, gait problem and neck pain.        Chronic, stable.   Psychiatric/Behavioral: Negative.         Objective       Vitals:    12/28/21 1309 12/28/21 1326   BP: 141/61 132/60   BP Location: Right arm Right arm   Patient Position: Sitting Sitting   Cuff Size: Large Adult Adult   Pulse: 65 65   Temp: 97.9 °F (36.6 °C)    SpO2: 99%    Weight: 108 kg (238 lb 3.2 oz)    Height: 188 cm (74\")  Comment: pt reported    PainSc:   8    PainLoc: Back      BMI Readings from Last 1 Encounters:   12/28/21 30.58 kg/m²   BMI is above normal parameters. " Recommendations include: exercise counseling and nutrition counseling    Does the patient have evidence of cognitive impairment? No    Physical Exam  Vitals and nursing note reviewed.   Constitutional:       General: He is not in acute distress.     Appearance: He is well-developed. He is not diaphoretic.   HENT:      Head: Normocephalic and atraumatic.   Eyes:      Conjunctiva/sclera: Conjunctivae normal.      Pupils: Pupils are equal, round, and reactive to light.   Cardiovascular:      Rate and Rhythm: Normal rate and regular rhythm.      Pulses: Normal pulses.      Heart sounds: Normal heart sounds. No murmur heard.      Pulmonary:      Effort: Pulmonary effort is normal. No respiratory distress.      Breath sounds: Normal breath sounds.   Abdominal:      General: Bowel sounds are normal. There is no distension.      Palpations: Abdomen is soft.      Tenderness: There is no abdominal tenderness.   Musculoskeletal:         General: Tenderness present. Normal range of motion.      Cervical back: Normal range of motion and neck supple.      Comments: Lumbar tenderness.   Skin:     General: Skin is warm and dry.      Capillary Refill: Capillary refill takes less than 2 seconds.      Findings: Erythema present.   Neurological:      Mental Status: He is alert and oriented to person, place, and time.   Psychiatric:         Behavior: Behavior normal.         Thought Content: Thought content normal.         Judgment: Judgment normal.       Lab Results   Component Value Date    CHLPL 168 12/27/2021    TRIG 201 (H) 12/27/2021    HDL 34 (L) 12/27/2021    LDL 99 12/27/2021    VLDL 35 12/27/2021    HGBA1C 6.3 (A) 12/28/2021          HEALTH RISK ASSESSMENT    Smoking Status:  Social History     Tobacco Use   Smoking Status Current Every Day Smoker   • Packs/day: 1.00   • Years: 50.00   • Pack years: 50.00   • Types: Cigarettes   Smokeless Tobacco Never Used     Alcohol Consumption:  Social History     Substance and Sexual  Activity   Alcohol Use Yes    Comment: occ     Fall Risk Screen:    JEAN CLAUDEADI Fall Risk Assessment was completed, and patient is at LOW risk for falls.Assessment completed on:12/28/2021    Depression Screen:   PHQ-2/PHQ-9 Depression Screening 12/28/2021   Little interest or pleasure in doing things 0   Feeling down, depressed, or hopeless 0   Trouble falling or staying asleep, or sleeping too much -   Feeling tired or having little energy -   Poor appetite or overeating -   Feeling bad about yourself - or that you are a failure or have let yourself or your family down -   Trouble concentrating on things, such as reading the newspaper or watching television -   Moving or speaking so slowly that other people could have noticed. Or the opposite - being so fidgety or restless that you have been moving around a lot more than usual -   Thoughts that you would be better off dead, or of hurting yourself in some way -   Total Score 0   If you checked off any problems, how difficult have these problems made it for you to do your work, take care of things at home, or get along with other people? -       Health Habits and Functional and Cognitive Screening:  Functional & Cognitive Status 12/28/2021   Do you have difficulty preparing food and eating? No   Do you have difficulty bathing yourself, getting dressed or grooming yourself? No   Do you have difficulty using the toilet? No   Do you have difficulty moving around from place to place? No   Do you have trouble with steps or getting out of a bed or a chair? No   Current Diet Unhealthy Diet   Dental Exam Not up to date   Eye Exam Up to date   Exercise (times per week) 0 times per week   Current Exercises Include No Regular Exercise   Current Exercise Activities Include -   Do you need help using the phone?  No   Are you deaf or do you have serious difficulty hearing?  No   Do you need help with transportation? No   Do you need help shopping? No   Do you need help preparing meals?   No   Do you need help with housework?  No   Do you need help with laundry? No   Do you need help taking your medications? No   Do you need help managing money? No   Do you ever drive or ride in a car without wearing a seat belt? No   Have you felt unusual stress, anger or loneliness in the last month? Yes   Who do you live with? Spouse   If you need help, do you have trouble finding someone available to you? No   Have you been bothered in the last four weeks by sexual problems? No   Do you have difficulty concentrating, remembering or making decisions? No       Age-appropriate Screening Schedule:  Refer to the list below for future screening recommendations based on patient's age, sex and/or medical conditions. Orders for these recommended tests are listed in the plan section. The patient has been provided with a written plan.    Health Maintenance   Topic Date Due   • INFLUENZA VACCINE  11/02/2022 (Originally 8/1/2021)   • TDAP/TD VACCINES (1 - Tdap) 12/28/2022 (Originally [birth year]/1975)   • ZOSTER VACCINE (1 of 2) 12/28/2022 (Originally 4/12/2006)   • LIPID PANEL  12/27/2022            Assessment/Plan   CMS Preventative Services Quick Reference  Risk Factors Identified During Encounter  Cardiovascular Disease  Chronic Pain   Immunizations Discussed/Encouraged (specific Immunizations; Tdap, Pneumococcal 23, Shingrix and COVID19  Obesity/Overweight   The above risks/problems have been discussed with the patient.  Follow up actions/plans if indicated are seen below in the Assessment/Plan Section.  Pertinent information has been shared with the patient in the After Visit Summary.    Diagnoses and all orders for this visit:    1. Medicare annual wellness visit, initial (Primary)    2. Hyperglycemia  -     POCT glycated hemoglobin, total    3. Essential hypertension    4. Hypertriglyceridemia    5. Lumbar radiculopathy    6. Lumbar back pain    7. Neuropathic pain    8. Obesity (BMI 30.0-34.9)    9. Pre-diabetes  -      metFORMIN (Glucophage) 500 MG tablet; Take 1 tablet by mouth Daily With Breakfast.  Dispense: 30 tablet; Refill: 5    Patient encouraged to modify diet and decrease daily beer intake.  Diet, low carb, rich in fresh vegetables and lean proteins.  Patient encouraged to participate in daily low impact exercise with goal of 30 min sustained activity.    Follow Up:  Return in about 6 months (around 6/28/2022) for Next scheduled follow up with CMP/Hgb A1c prior.     An After Visit Summary and PPPS were made available to the patient.

## 2022-01-11 ENCOUNTER — TELEPHONE (OUTPATIENT)
Dept: FAMILY MEDICINE CLINIC | Facility: CLINIC | Age: 67
End: 2022-01-11

## 2022-01-11 NOTE — TELEPHONE ENCOUNTER
Caller: Rupa Verma    Relationship: Self    Best call back number: [phone]    What is the medical concern/diagnosis:     What specialty or service is being requested:      Chest Doctor, per patient    What is the provider, practice or medical service name:      Dr. Fritz    What is the office location:     [place]

## 2022-01-12 ENCOUNTER — HOSPITAL ENCOUNTER (OUTPATIENT)
Dept: CT IMAGING | Facility: HOSPITAL | Age: 67
End: 2022-01-12

## 2022-01-13 DIAGNOSIS — J43.1 PANLOBULAR EMPHYSEMA: Primary | ICD-10-CM

## 2022-01-14 ENCOUNTER — HOSPITAL ENCOUNTER (OUTPATIENT)
Dept: CT IMAGING | Facility: HOSPITAL | Age: 67
Discharge: HOME | End: 2022-01-14
Admitting: INTERNAL MEDICINE

## 2022-01-14 DIAGNOSIS — Z86.11: ICD-10-CM

## 2022-01-14 DIAGNOSIS — R91.1 RIGHT UPPER LOBE PULMONARY NODULE: ICD-10-CM

## 2022-01-14 DIAGNOSIS — J43.1 PANLOBULAR EMPHYSEMA: ICD-10-CM

## 2022-01-14 DIAGNOSIS — J84.10 PULMONARY FIBROSIS: ICD-10-CM

## 2022-01-14 PROCEDURE — 71250 CT THORAX DX C-: CPT

## 2022-01-14 NOTE — PROGRESS NOTES
Please call the patient regarding his abnormal result.most of the nodules are all very stable.  There is a new small one on the right; follow up repeat scan is recommended in 6 months.  Will discuss at office visit.

## 2022-01-18 NOTE — PROGRESS NOTES
Spoke with patient and relayed test results. Patient voiced understanding. Patient is agreeable to the repeat CT in 6 months. His office visit was rescheduled for 4/6/22.

## 2022-01-24 DIAGNOSIS — I10 ESSENTIAL HYPERTENSION: ICD-10-CM

## 2022-01-24 DIAGNOSIS — E78.5 HYPERLIPIDEMIA LDL GOAL <100: ICD-10-CM

## 2022-01-24 DIAGNOSIS — F32.89 OTHER DEPRESSION: ICD-10-CM

## 2022-01-25 NOTE — TELEPHONE ENCOUNTER
Rx Refill Note  Requested Prescriptions     Pending Prescriptions Disp Refills   • amLODIPine (NORVASC) 5 MG tablet [Pharmacy Med Name: AMLODIPINE BESYLATE 5MG TABS] 90 tablet 1     Sig: TAKE ONE TABLET BY MOUTH EVERY DAY   • simvastatin (ZOCOR) 20 MG tablet [Pharmacy Med Name: SIMVASTATIN 20MG TABS] 90 tablet 0     Sig: TAKE ONE TABLET BY MOUTH EVERY DAY   • citalopram (CeleXA) 20 MG tablet [Pharmacy Med Name: CITALOPRAM HYDROBROMIDE 20MG TABS] 90 tablet 0     Sig: TAKE ONE TABLET BY MOUTH EVERY DAY   • metoprolol tartrate (LOPRESSOR) 50 MG tablet [Pharmacy Med Name: METOPROLOL TARTRATE 50MG TABS] 180 tablet 0     Sig: TAKE ONE TABLET BY MOUTH TWICE A DAY      Last office visit with prescribing clinician: 12/28/2021      Next office visit with prescribing clinician: 6/28/2022       Tiffanie Amato MA  01/25/22, 08:35 CST

## 2022-01-28 RX ORDER — SIMVASTATIN 20 MG
TABLET ORAL
Qty: 90 TABLET | Refills: 0 | Status: SHIPPED | OUTPATIENT
Start: 2022-01-28 | End: 2022-04-29

## 2022-01-28 RX ORDER — CITALOPRAM 20 MG/1
TABLET ORAL
Qty: 90 TABLET | Refills: 0 | Status: SHIPPED | OUTPATIENT
Start: 2022-01-28 | End: 2022-04-29

## 2022-01-28 RX ORDER — METOPROLOL TARTRATE 50 MG/1
TABLET, FILM COATED ORAL
Qty: 180 TABLET | Refills: 0 | Status: SHIPPED | OUTPATIENT
Start: 2022-01-28 | End: 2022-04-29

## 2022-01-28 RX ORDER — AMLODIPINE BESYLATE 5 MG/1
TABLET ORAL
Qty: 90 TABLET | Refills: 1 | Status: SHIPPED | OUTPATIENT
Start: 2022-01-28 | End: 2022-06-29 | Stop reason: SDUPTHER

## 2022-04-13 ENCOUNTER — OFFICE VISIT (OUTPATIENT)
Dept: FAMILY MEDICINE CLINIC | Facility: CLINIC | Age: 67
End: 2022-04-13

## 2022-04-13 VITALS
HEIGHT: 74 IN | TEMPERATURE: 98 F | SYSTOLIC BLOOD PRESSURE: 132 MMHG | BODY MASS INDEX: 29.7 KG/M2 | HEART RATE: 70 BPM | WEIGHT: 231.4 LBS | OXYGEN SATURATION: 98 % | DIASTOLIC BLOOD PRESSURE: 66 MMHG

## 2022-04-13 DIAGNOSIS — M79.2 NEUROPATHIC PAIN: ICD-10-CM

## 2022-04-13 DIAGNOSIS — M54.50 LUMBAR BACK PAIN: ICD-10-CM

## 2022-04-13 DIAGNOSIS — M54.16 LUMBAR RADICULOPATHY: Primary | ICD-10-CM

## 2022-04-13 PROCEDURE — 99213 OFFICE O/P EST LOW 20 MIN: CPT | Performed by: NURSE PRACTITIONER

## 2022-04-13 NOTE — PROGRESS NOTES
"Chief Complaint  Back Pain    Subjective          Rupa Verma presents to [facility]  History of Present Illness  Patient has a long standing history of lumbar DDD and low back pain.  He has radicular pain bilaterally that is equal.  He has been seen by Dr. Hoskins in [place] but he does not prescribe pain medication.  He states he underwent EMG and NCV, I do not have the results of testing at time of visit.    Objective   Vital Signs:   /66 (BP Location: Right arm, Patient Position: Sitting, Cuff Size: Large Adult)   Pulse 70   Temp 98 °F (36.7 °C)   Ht 188 cm (74\") Comment: pt reported  Wt 105 kg (231 lb 6.4 oz)   SpO2 98%   BMI 29.71 kg/m²     BMI is above normal parameters. Recommendations: exercise counseling/recommendations and nutrition counseling/recommendations       Physical Exam  Vitals and nursing note reviewed.   Constitutional:       General: He is not in acute distress.     Appearance: Normal appearance. He is not ill-appearing.   Cardiovascular:      Rate and Rhythm: Normal rate and regular rhythm.      Heart sounds: Normal heart sounds. No murmur heard.  Pulmonary:      Effort: Pulmonary effort is normal.      Breath sounds: Normal breath sounds.   Musculoskeletal:      Right lower leg: No edema.      Left lower leg: No edema.      Comments: Pain with bilateral straight leg raise at 45 degrees.  Pain refers back to low back and buttocks.   Skin:     General: Skin is warm and dry.   Neurological:      Mental Status: He is alert and oriented to person, place, and time.   Psychiatric:         Thought Content: Thought content normal.        Result Review :                 Assessment and Plan    Diagnoses and all orders for this visit:    1. Lumbar radiculopathy (Primary)  -     Ambulatory Referral to Pain Management    2. Lumbar back pain  -     Ambulatory Referral to Pain Management    3. Neuropathic pain  -     Ambulatory Referral to Pain " Management        Follow Up   Return if symptoms worsen or fail to improve.  Patient was given instructions and counseling regarding his condition or for health maintenance advice. Please see specific information pulled into the AVS if appropriate.

## 2022-04-26 RX ORDER — PANTOPRAZOLE SODIUM 20 MG/1
TABLET, DELAYED RELEASE ORAL
Qty: 90 TABLET | Refills: 1 | Status: SHIPPED | OUTPATIENT
Start: 2022-04-26 | End: 2022-10-25

## 2022-04-26 NOTE — TELEPHONE ENCOUNTER
Rx Refill Note  Requested Prescriptions     Pending Prescriptions Disp Refills   • metFORMIN (GLUCOPHAGE) 500 MG tablet [Pharmacy Med Name: METFORMIN HCL 500MG TABS] 30 tablet 5     Sig: TAKE ONE TABLET BY MOUTH EVERY DAY WITH BREAKFAST   • pantoprazole (PROTONIX) 20 MG EC tablet [Pharmacy Med Name: PANTOPRAZOLE SODIUM 20MG TBEC] 90 tablet 1     Sig: TAKE ONE TABLET BY MOUTH EVERY DAY      Last office visit with prescribing clinician: 4/13/2022      Next office visit with prescribing clinician: 6/28/2022     Dedra Gould MA  04/26/22, 14:07 CDT

## 2022-04-28 DIAGNOSIS — F32.89 OTHER DEPRESSION: ICD-10-CM

## 2022-04-28 DIAGNOSIS — I10 ESSENTIAL HYPERTENSION: ICD-10-CM

## 2022-04-28 DIAGNOSIS — E78.5 HYPERLIPIDEMIA LDL GOAL <100: ICD-10-CM

## 2022-04-28 NOTE — TELEPHONE ENCOUNTER
Caller: Bayron Robertotommy SUNG    Relationship: Self    Best call back number: [phone]    Requested Prescriptions:   Requested Prescriptions     Pending Prescriptions Disp Refills   • metFORMIN (GLUCOPHAGE) 500 MG tablet 90 tablet 1     Sig: Take 1 tablet by mouth Daily With Breakfast.        Pharmacy where request should be sent: [facility] [place], [place] - [address] [place] [address] [phone] [facility] [phone]      Additional details provided by patient: LESS THAN 2 DAYS SUPPLY    Does the patient have less than a 3 day supply:  [x] Yes  [] No    Miguel Molina Rep   04/28/22 15:07 CDT

## 2022-04-28 NOTE — TELEPHONE ENCOUNTER
Rx Refill Note  Requested Prescriptions     Pending Prescriptions Disp Refills   • metFORMIN (GLUCOPHAGE) 500 MG tablet 90 tablet 1     Sig: Take 1 tablet by mouth Daily With Breakfast.      Last office visit with prescribing clinician: 7/7/2021      Next office visit with prescribing clinician: Visit date not found         Evelia Sullivan MA  04/28/22, 15:45 CDT

## 2022-04-28 NOTE — TELEPHONE ENCOUNTER
Rx Refill Note  Requested Prescriptions     Pending Prescriptions Disp Refills   • citalopram (CeleXA) 20 MG tablet [Pharmacy Med Name: CITALOPRAM HYDROBROMIDE 20MG TABS] 90 tablet 0     Sig: TAKE ONE TABLET BY MOUTH EVERY DAY   • simvastatin (ZOCOR) 20 MG tablet [Pharmacy Med Name: SIMVASTATIN 20MG TABS] 90 tablet 0     Sig: TAKE ONE TABLET BY MOUTH EVERY DAY   • metoprolol tartrate (LOPRESSOR) 50 MG tablet [Pharmacy Med Name: METOPROLOL TARTRATE 50MG TABS] 180 tablet 0     Sig: TAKE ONE TABLET BY MOUTH TWICE A DAY      Last office visit with prescribing clinician: 4/13/2022      Next office visit with prescribing clinician: 6/28/2022         Evelia Sullivan MA  04/28/22, 10:44 CDT

## 2022-04-29 RX ORDER — SIMVASTATIN 20 MG
TABLET ORAL
Qty: 90 TABLET | Refills: 0 | Status: SHIPPED | OUTPATIENT
Start: 2022-04-29 | End: 2022-06-29 | Stop reason: SDUPTHER

## 2022-04-29 RX ORDER — CITALOPRAM 20 MG/1
TABLET ORAL
Qty: 90 TABLET | Refills: 0 | Status: SHIPPED | OUTPATIENT
Start: 2022-04-29 | End: 2022-06-29 | Stop reason: SDUPTHER

## 2022-04-29 RX ORDER — METOPROLOL TARTRATE 50 MG/1
TABLET, FILM COATED ORAL
Qty: 180 TABLET | Refills: 0 | Status: SHIPPED | OUTPATIENT
Start: 2022-04-29 | End: 2022-06-29 | Stop reason: SDUPTHER

## 2022-05-27 ENCOUNTER — TELEPHONE (OUTPATIENT)
Dept: PULMONOLOGY | Facility: CLINIC | Age: 67
End: 2022-05-27

## 2022-05-27 NOTE — TELEPHONE ENCOUNTER
Patient called stating that Dr. Andrade in [facility] diagnosed him with pneumonia.  They gave him a z-shawn and he has two days left.  He is still coughing up blood (1/4 tsp).   He is wanting your opinion on this.

## 2022-05-27 NOTE — TELEPHONE ENCOUNTER
We would need to see him back in office.  See if we can work him in sometime.  Go to er if he gets worse in meantime.  We are at end of the day going into a holiday weekend

## 2022-05-27 NOTE — TELEPHONE ENCOUNTER
See if we have any openings with Dr. Fritz or NP next week.   I told him yall would call him with a date an time.  He can't do June 8th.

## 2022-06-02 ENCOUNTER — OFFICE VISIT (OUTPATIENT)
Dept: PULMONOLOGY | Facility: CLINIC | Age: 67
End: 2022-06-02

## 2022-06-02 VITALS
HEART RATE: 72 BPM | WEIGHT: 222 LBS | SYSTOLIC BLOOD PRESSURE: 122 MMHG | OXYGEN SATURATION: 98 % | HEIGHT: 74 IN | BODY MASS INDEX: 28.49 KG/M2 | DIASTOLIC BLOOD PRESSURE: 70 MMHG

## 2022-06-02 DIAGNOSIS — J84.10 PULMONARY FIBROSIS: ICD-10-CM

## 2022-06-02 DIAGNOSIS — J43.1 PANLOBULAR EMPHYSEMA: Primary | ICD-10-CM

## 2022-06-02 DIAGNOSIS — Z86.11: ICD-10-CM

## 2022-06-02 DIAGNOSIS — R91.1 RIGHT UPPER LOBE PULMONARY NODULE: ICD-10-CM

## 2022-06-02 PROCEDURE — 99214 OFFICE O/P EST MOD 30 MIN: CPT | Performed by: INTERNAL MEDICINE

## 2022-06-02 RX ORDER — METHOCARBAMOL 500 MG/1
TABLET, FILM COATED ORAL
COMMUNITY
Start: 2022-05-27 | End: 2022-06-29

## 2022-06-02 RX ORDER — GABAPENTIN 400 MG/1
CAPSULE ORAL
COMMUNITY
Start: 2022-05-27 | End: 2022-06-29

## 2022-06-29 ENCOUNTER — OFFICE VISIT (OUTPATIENT)
Dept: FAMILY MEDICINE CLINIC | Facility: CLINIC | Age: 67
End: 2022-06-29

## 2022-06-29 VITALS
OXYGEN SATURATION: 97 % | TEMPERATURE: 96.5 F | SYSTOLIC BLOOD PRESSURE: 130 MMHG | WEIGHT: 227.4 LBS | HEIGHT: 75 IN | HEART RATE: 56 BPM | BODY MASS INDEX: 28.27 KG/M2 | DIASTOLIC BLOOD PRESSURE: 65 MMHG

## 2022-06-29 DIAGNOSIS — E78.5 HYPERLIPIDEMIA LDL GOAL <100: ICD-10-CM

## 2022-06-29 DIAGNOSIS — F32.89 OTHER DEPRESSION: ICD-10-CM

## 2022-06-29 DIAGNOSIS — R73.9 HYPERGLYCEMIA: Primary | ICD-10-CM

## 2022-06-29 DIAGNOSIS — I10 ESSENTIAL HYPERTENSION: ICD-10-CM

## 2022-06-29 LAB
EXPIRATION DATE: NORMAL
HBA1C MFR BLD: 5.9 %
Lab: NORMAL

## 2022-06-29 PROCEDURE — 99214 OFFICE O/P EST MOD 30 MIN: CPT | Performed by: NURSE PRACTITIONER

## 2022-06-29 PROCEDURE — 83036 HEMOGLOBIN GLYCOSYLATED A1C: CPT | Performed by: NURSE PRACTITIONER

## 2022-06-29 PROCEDURE — 3044F HG A1C LEVEL LT 7.0%: CPT | Performed by: NURSE PRACTITIONER

## 2022-06-29 RX ORDER — SIMVASTATIN 20 MG
20 TABLET ORAL DAILY
Qty: 90 TABLET | Refills: 1 | Status: SHIPPED | OUTPATIENT
Start: 2022-06-29 | End: 2022-12-30

## 2022-06-29 RX ORDER — METOPROLOL TARTRATE 50 MG/1
50 TABLET, FILM COATED ORAL 2 TIMES DAILY
Qty: 180 TABLET | Refills: 1 | Status: SHIPPED | OUTPATIENT
Start: 2022-06-29 | End: 2022-12-30 | Stop reason: SDUPTHER

## 2022-06-29 RX ORDER — CITALOPRAM 20 MG/1
20 TABLET ORAL DAILY
Qty: 90 TABLET | Refills: 1 | Status: SHIPPED | OUTPATIENT
Start: 2022-06-29 | End: 2022-12-30 | Stop reason: SDUPTHER

## 2022-06-29 RX ORDER — AMLODIPINE BESYLATE 5 MG/1
5 TABLET ORAL DAILY
Qty: 90 TABLET | Refills: 1 | Status: SHIPPED | OUTPATIENT
Start: 2022-06-29 | End: 2022-12-30 | Stop reason: SDUPTHER

## 2022-06-29 NOTE — PROGRESS NOTES
"Chief Complaint  Diabetes and Hypertension    Dionte Verma presents to [facility]  History of Present Illness  DM:  Patient admits to cutting way back on his \"sweets\" and compliance with metformin use.  He has had no symptoms consistent with hypoglycemia or hyperglycemia.  HTN:  Patient is compliant with medication.  He does not routinely check his B/P at home.  He has no symptoms consistent with hypertensive episodes.    Objective   Vital Signs:  /65 (BP Location: Right arm, Patient Position: Sitting, Cuff Size: Large Adult)   Pulse 56   Temp 96.5 °F (35.8 °C)   Ht 189.2 cm (74.5\") Comment: pt reported  Wt 103 kg (227 lb 6.4 oz)   SpO2 97%   BMI 28.81 kg/m²   Estimated body mass index is 28.81 kg/m² as calculated from the following:    Height as of this encounter: 189.2 cm (74.5\").    Weight as of this encounter: 103 kg (227 lb 6.4 oz).          Physical Exam  Vitals and nursing note reviewed.   Constitutional:       General: He is not in acute distress.     Appearance: Normal appearance. He is not ill-appearing.   HENT:      Head: Normocephalic and atraumatic.   Cardiovascular:      Rate and Rhythm: Regular rhythm. Bradycardia present.      Pulses: Normal pulses.      Heart sounds: Normal heart sounds. No murmur heard.  Pulmonary:      Effort: Pulmonary effort is normal.      Breath sounds: Normal breath sounds.   Musculoskeletal:      Right lower leg: No edema.      Left lower leg: No edema.   Skin:     General: Skin is warm and dry.   Neurological:      Mental Status: He is alert and oriented to person, place, and time.   Psychiatric:         Thought Content: Thought content normal.        Result Review :                Assessment and Plan   Diagnoses and all orders for this visit:    1. Hyperglycemia (Primary)  -     POCT glycated hemoglobin, total    2. Essential hypertension  -     amLODIPine (NORVASC) 5 MG tablet; Take 1 tablet by mouth Daily. "  Dispense: 90 tablet; Refill: 1  -     metoprolol tartrate (LOPRESSOR) 50 MG tablet; Take 1 tablet by mouth 2 (Two) Times a Day.  Dispense: 180 tablet; Refill: 1    3. Other depression  -     citalopram (CeleXA) 20 MG tablet; Take 1 tablet by mouth Daily.  Dispense: 90 tablet; Refill: 1    4. Hyperlipidemia LDL goal <100  -     simvastatin (ZOCOR) 20 MG tablet; Take 1 tablet by mouth Daily.  Dispense: 90 tablet; Refill: 1    Continue current treatment plan.         Follow Up   Return in about 6 months (around 12/29/2022) for Medicare Wellness with labs prior.  Patient was given instructions and counseling regarding his condition or for health maintenance advice. Please see specific information pulled into the AVS if appropriate.

## 2022-07-07 ENCOUNTER — HOSPITAL ENCOUNTER (OUTPATIENT)
Dept: CT IMAGING | Facility: HOSPITAL | Age: 67
Discharge: HOME | End: 2022-07-07
Admitting: INTERNAL MEDICINE

## 2022-07-07 DIAGNOSIS — R91.1 RIGHT UPPER LOBE PULMONARY NODULE: ICD-10-CM

## 2022-07-07 DIAGNOSIS — R91.1 NODULE OF UPPER LOBE OF RIGHT LUNG: Primary | ICD-10-CM

## 2022-07-07 PROCEDURE — 71250 CT THORAX DX C-: CPT

## 2022-07-07 NOTE — PROGRESS NOTES
Please call the patient regarding his abnormal result.  The nodule we were looking at is stable which is good.  However there is another small nodule that has shown up on the new film and should be rechecked in 3 months.  Will order follow up ct.  Have him follow up in office after that rather than in December.

## 2022-08-01 DIAGNOSIS — E53.8 VITAMIN B12 DEFICIENCY: Primary | ICD-10-CM

## 2022-08-01 DIAGNOSIS — M05.80 POLYARTHRITIS WITH POSITIVE RHEUMATOID FACTOR: ICD-10-CM

## 2022-08-01 RX ORDER — CYANOCOBALAMIN 1000 UG/ML
1000 INJECTION, SOLUTION INTRAMUSCULAR; SUBCUTANEOUS
Status: SHIPPED | OUTPATIENT
Start: 2022-08-01

## 2022-08-02 ENCOUNTER — CLINICAL SUPPORT (OUTPATIENT)
Dept: FAMILY MEDICINE CLINIC | Facility: CLINIC | Age: 67
End: 2022-08-02

## 2022-08-02 DIAGNOSIS — E53.8 VITAMIN B12 DEFICIENCY: ICD-10-CM

## 2022-08-02 PROCEDURE — 96372 THER/PROPH/DIAG INJ SC/IM: CPT | Performed by: NURSE PRACTITIONER

## 2022-08-02 RX ADMIN — CYANOCOBALAMIN 1000 MCG: 1000 INJECTION, SOLUTION INTRAMUSCULAR; SUBCUTANEOUS at 08:52

## 2022-08-02 NOTE — PROGRESS NOTES
Patient presented to the office for 1,000mg of B12. Injected into left arm. Patient tolerated with no reactions.

## 2022-08-09 ENCOUNTER — CLINICAL SUPPORT (OUTPATIENT)
Dept: FAMILY MEDICINE CLINIC | Facility: CLINIC | Age: 67
End: 2022-08-09

## 2022-08-09 DIAGNOSIS — E53.8 LOW SERUM VITAMIN B12: ICD-10-CM

## 2022-08-09 PROCEDURE — 96372 THER/PROPH/DIAG INJ SC/IM: CPT | Performed by: NURSE PRACTITIONER

## 2022-08-09 RX ADMIN — CYANOCOBALAMIN 1000 MCG: 1000 INJECTION, SOLUTION INTRAMUSCULAR; SUBCUTANEOUS at 08:05

## 2022-08-09 NOTE — PROGRESS NOTES
Patient presented to the office for 1,000mg of B12. Injected into right arm. Patient tolerated with no reactions.

## 2022-08-16 ENCOUNTER — CLINICAL SUPPORT (OUTPATIENT)
Dept: FAMILY MEDICINE CLINIC | Facility: CLINIC | Age: 67
End: 2022-08-16

## 2022-08-16 DIAGNOSIS — E53.8 B12 DEFICIENCY: ICD-10-CM

## 2022-08-16 PROCEDURE — 96372 THER/PROPH/DIAG INJ SC/IM: CPT | Performed by: NURSE PRACTITIONER

## 2022-08-16 RX ADMIN — CYANOCOBALAMIN 1000 MCG: 1000 INJECTION, SOLUTION INTRAMUSCULAR; SUBCUTANEOUS at 09:43

## 2022-08-30 ENCOUNTER — CLINICAL SUPPORT (OUTPATIENT)
Dept: FAMILY MEDICINE CLINIC | Facility: CLINIC | Age: 67
End: 2022-08-30

## 2022-08-30 DIAGNOSIS — E53.8 B12 DEFICIENCY: Primary | ICD-10-CM

## 2022-08-30 PROCEDURE — 96372 THER/PROPH/DIAG INJ SC/IM: CPT | Performed by: NURSE PRACTITIONER

## 2022-08-30 RX ORDER — CYANOCOBALAMIN 1000 UG/ML
1000 INJECTION, SOLUTION INTRAMUSCULAR; SUBCUTANEOUS
Status: SHIPPED | OUTPATIENT
Start: 2022-08-30

## 2022-08-30 RX ADMIN — CYANOCOBALAMIN 1000 MCG: 1000 INJECTION, SOLUTION INTRAMUSCULAR; SUBCUTANEOUS at 10:41

## 2022-09-06 ENCOUNTER — CLINICAL SUPPORT (OUTPATIENT)
Dept: FAMILY MEDICINE CLINIC | Facility: CLINIC | Age: 67
End: 2022-09-06

## 2022-09-06 DIAGNOSIS — E53.8 LOW SERUM VITAMIN B12: ICD-10-CM

## 2022-09-06 PROCEDURE — 96372 THER/PROPH/DIAG INJ SC/IM: CPT | Performed by: NURSE PRACTITIONER

## 2022-09-06 RX ADMIN — CYANOCOBALAMIN 1000 MCG: 1000 INJECTION, SOLUTION INTRAMUSCULAR; SUBCUTANEOUS at 09:43

## 2022-10-06 ENCOUNTER — CLINICAL SUPPORT (OUTPATIENT)
Dept: FAMILY MEDICINE CLINIC | Facility: CLINIC | Age: 67
End: 2022-10-06

## 2022-10-06 PROCEDURE — 96372 THER/PROPH/DIAG INJ SC/IM: CPT | Performed by: NURSE PRACTITIONER

## 2022-10-06 RX ADMIN — CYANOCOBALAMIN 1000 MCG: 1000 INJECTION, SOLUTION INTRAMUSCULAR; SUBCUTANEOUS at 09:36

## 2022-10-07 ENCOUNTER — HOSPITAL ENCOUNTER (OUTPATIENT)
Dept: CT IMAGING | Facility: HOSPITAL | Age: 67
Discharge: HOME | End: 2022-10-07
Admitting: INTERNAL MEDICINE

## 2022-10-07 DIAGNOSIS — R91.1 NODULE OF UPPER LOBE OF RIGHT LUNG: ICD-10-CM

## 2022-10-07 DIAGNOSIS — R91.1 LEFT UPPER LOBE PULMONARY NODULE: Primary | ICD-10-CM

## 2022-10-07 PROCEDURE — 71250 CT THORAX DX C-: CPT

## 2022-10-07 NOTE — PROGRESS NOTES
Please call the patient regarding his abnormal result.  The nodule looks to be a little bigger.  I think we should get a PET scan which will help us sort it out to determine if we need to biopsy it or do any other types of testing.

## 2022-10-18 ENCOUNTER — HOSPITAL ENCOUNTER (OUTPATIENT)
Dept: CT IMAGING | Facility: HOSPITAL | Age: 67
Discharge: HOME | End: 2022-10-18

## 2022-10-18 DIAGNOSIS — R91.1 LEFT UPPER LOBE PULMONARY NODULE: ICD-10-CM

## 2022-10-18 PROCEDURE — 78815 PET IMAGE W/CT SKULL-THIGH: CPT

## 2022-10-18 PROCEDURE — 0 FLUDEOXYGLUCOSE F18 SOLUTION: Performed by: INTERNAL MEDICINE

## 2022-10-18 PROCEDURE — A9552 F18 FDG: HCPCS | Performed by: INTERNAL MEDICINE

## 2022-10-18 RX ADMIN — FLUDEOXYGLUCOSE F18 1 DOSE: 300 INJECTION INTRAVENOUS at 10:09

## 2022-10-20 NOTE — PROGRESS NOTES
Let pt know this looked ok.  The scan suggested the area of concern is not cancer.  Nothing else to do for now.  Keep follow up as planned

## 2022-10-25 RX ORDER — PANTOPRAZOLE SODIUM 20 MG/1
TABLET, DELAYED RELEASE ORAL
Qty: 90 TABLET | Refills: 1 | Status: SHIPPED | OUTPATIENT
Start: 2022-10-25 | End: 2022-12-30 | Stop reason: SDUPTHER

## 2022-10-25 NOTE — TELEPHONE ENCOUNTER
Rx Refill Note  Requested Prescriptions     Pending Prescriptions Disp Refills   • pantoprazole (PROTONIX) 20 MG EC tablet [Pharmacy Med Name: PANTOPRAZOLE SODIUM 20MG TBEC] 90 tablet 1     Sig: TAKE ONE TABLET BY MOUTH EVERY DAY      Last office visit with prescribing clinician: 6/29/2022      Next office visit with prescribing clinician: 12/30/2022     Dedra Gould MA  10/25/22, 07:23 CDT

## 2022-12-05 ENCOUNTER — OFFICE VISIT (OUTPATIENT)
Dept: PULMONOLOGY | Facility: CLINIC | Age: 67
End: 2022-12-05

## 2022-12-05 VITALS
OXYGEN SATURATION: 96 % | HEIGHT: 73 IN | BODY MASS INDEX: 30.88 KG/M2 | DIASTOLIC BLOOD PRESSURE: 80 MMHG | HEART RATE: 72 BPM | WEIGHT: 233 LBS | SYSTOLIC BLOOD PRESSURE: 138 MMHG

## 2022-12-05 DIAGNOSIS — Z86.11: ICD-10-CM

## 2022-12-05 DIAGNOSIS — J43.1 PANLOBULAR EMPHYSEMA: Primary | ICD-10-CM

## 2022-12-05 DIAGNOSIS — R91.1 RIGHT UPPER LOBE PULMONARY NODULE: ICD-10-CM

## 2022-12-05 DIAGNOSIS — J84.10 PULMONARY FIBROSIS: ICD-10-CM

## 2022-12-05 PROCEDURE — 99214 OFFICE O/P EST MOD 30 MIN: CPT | Performed by: INTERNAL MEDICINE

## 2022-12-05 PROCEDURE — 94375 RESPIRATORY FLOW VOLUME LOOP: CPT | Performed by: INTERNAL MEDICINE

## 2022-12-05 NOTE — PROCEDURES
Pulmonary Function Test  Performed by: Kristina Cao, RRT  Authorized by: Rj Fritz MD      Pre Drug % Predicted    FVC: 86%   FEV1: 73%   FEF 25-75%: 54%   FEV1/FVC: 64%    Interpretation   Spirometry   Spirometry shows moderate obstruction. There is reduced midflow suggesting small airway/airflow obstruction.

## 2022-12-05 NOTE — PROGRESS NOTES
"Background:  pt with hx mild copd and roxy nodule 2.5 cm identified 2012, tuberculosis identified on bronchoscopy 7/2012.  multiple lung nodules   hx smoking.   Chief Complaint  Lung Nodule    Subjective    History of Present Illness       Rupa Verma presents to [facility] PULMONARY & CRITICAL CARE MEDICINE.  History of Present Illness  He still smokes.  He follows up after reassessment of lung nodule by pet scan.  No associated sx. No wheezing or dyspnea     Tobacco Use: High Risk   • Smoking Tobacco Use: Every Day   • Smokeless Tobacco Use: Never   • Passive Exposure: Not on file      Current Outpatient Medications   Medication Instructions   • amLODIPine (NORVASC) 5 mg, Oral, Daily   • aspirin 81 MG tablet aspirin 81 mg tablet,delayed release   • citalopram (CELEXA) 20 mg, Oral, Daily   • metFORMIN (GLUCOPHAGE) 500 mg, Oral, Daily With Breakfast   • metoprolol tartrate (LOPRESSOR) 50 mg, Oral, 2 Times Daily   • pantoprazole (PROTONIX) 20 MG EC tablet TAKE ONE TABLET BY MOUTH EVERY DAY   • simvastatin (ZOCOR) 20 mg, Oral, Daily      Objective     Vital Signs:   /80   Pulse 72   Ht 185.4 cm (73\")   Wt 106 kg (233 lb)   SpO2 96% Comment: RA  BMI 30.74 kg/m²   Physical Exam  Constitutional:       Appearance: Normal appearance. He is not ill-appearing or diaphoretic.   Eyes:      Extraocular Movements: Extraocular movements intact.   Pulmonary:      Effort: Pulmonary effort is normal. No respiratory distress.      Breath sounds: No wheezing, rhonchi or rales.   Skin:     Findings: No erythema or rash.   Neurological:      Mental Status: He is alert.        Result Review  Data Reviewed:{ Labs  Result Review  Imaging  Media :23}        NM PET/CT Skull Base to Mid Thigh (10/18/2022 11:20) minimal metabolic activity  CT Chest Without Contrast Diagnostic (10/07/2022 09:33)  1.  Enlarging 10 mm RIGHT upper lobe pulmonary nodule. Differential  includes primary lung neoplasm. Recommend " either PET/CT or short  interval follow-up chest CT in 2-3 months.   2.  Other pulmonary nodules are unchanged.  3.  Moderate emphysema.  PFT Values        Some values may be hidden. Unless noted otherwise, only the newest values recorded on each date are displayed.         Old Values PFT Results 12/5/22   No data to display.      Pre Drug PFT Results 12/5/22   FVC 86   FEV1 73   FEF 25-75% 54   FEV1/FVC 64      Post Drug PFT Results 12/5/22   No data to display.      Other Tests PFT Results 12/5/22   No data to display.                      Assessment and Plan  {CC Problem List  Visit Diagnosis  ROS  Review (Popup)  Health Maintenance  Quality  BestPractice  Medications  SmartSets  SnapShot Encounters  Media :23}   Diagnoses and all orders for this visit:    1. Panlobular emphysema (HCC) (Primary)  -     Pulmonary Function Test    2. Pulmonary fibrosis (HCC)    3. History of primary tuberculosis    4. Right upper lobe pulmonary nodule  -     CT Chest Without Contrast Diagnostic; Future    recommend smoking cessation although pt not motivated  Defer tx for copd/emphysema in absence of sx  Do not suspect nodule is due to tb treated remotely  Follow up ct in 6 months to reassess nodule; probably benign, doubt cancer or tb given course and pet scan results, but we discussed the pet scan can miss pathology albeit rarely.    Follow Up {Instructions Charge Capture  Follow-up Communications :23}   Return in about 6 months (around 6/5/2023).  Patient was given instructions and counseling regarding his condition or for health maintenance advice. Please see specific information pulled into the AVS if appropriate.    Electronically signed by Rj Fritz MD, 12/5/2022, 09:50 CST

## 2022-12-12 ENCOUNTER — CLINICAL SUPPORT (OUTPATIENT)
Dept: FAMILY MEDICINE CLINIC | Facility: CLINIC | Age: 67
End: 2022-12-12

## 2022-12-12 PROCEDURE — 96372 THER/PROPH/DIAG INJ SC/IM: CPT | Performed by: NURSE PRACTITIONER

## 2022-12-12 RX ADMIN — CYANOCOBALAMIN 1000 MCG: 1000 INJECTION, SOLUTION INTRAMUSCULAR; SUBCUTANEOUS at 11:22

## 2022-12-30 ENCOUNTER — OFFICE VISIT (OUTPATIENT)
Dept: FAMILY MEDICINE CLINIC | Facility: CLINIC | Age: 67
End: 2022-12-30

## 2022-12-30 VITALS
OXYGEN SATURATION: 96 % | TEMPERATURE: 97.5 F | SYSTOLIC BLOOD PRESSURE: 111 MMHG | HEIGHT: 73 IN | DIASTOLIC BLOOD PRESSURE: 57 MMHG | HEART RATE: 66 BPM | WEIGHT: 229.2 LBS | BODY MASS INDEX: 30.38 KG/M2

## 2022-12-30 DIAGNOSIS — F32.89 OTHER DEPRESSION: ICD-10-CM

## 2022-12-30 DIAGNOSIS — I10 ESSENTIAL HYPERTENSION: ICD-10-CM

## 2022-12-30 DIAGNOSIS — R73.03 PREDIABETES: ICD-10-CM

## 2022-12-30 DIAGNOSIS — Z00.00 ENCOUNTER FOR MEDICARE ANNUAL WELLNESS EXAM: Primary | ICD-10-CM

## 2022-12-30 DIAGNOSIS — E78.5 HYPERLIPIDEMIA LDL GOAL <100: ICD-10-CM

## 2022-12-30 DIAGNOSIS — K21.9 GASTROESOPHAGEAL REFLUX DISEASE, UNSPECIFIED WHETHER ESOPHAGITIS PRESENT: ICD-10-CM

## 2022-12-30 DIAGNOSIS — E66.9 OBESITY (BMI 30.0-34.9): ICD-10-CM

## 2022-12-30 PROCEDURE — 1170F FXNL STATUS ASSESSED: CPT | Performed by: NURSE PRACTITIONER

## 2022-12-30 PROCEDURE — 1159F MED LIST DOCD IN RCRD: CPT | Performed by: NURSE PRACTITIONER

## 2022-12-30 PROCEDURE — G0439 PPPS, SUBSEQ VISIT: HCPCS | Performed by: NURSE PRACTITIONER

## 2022-12-30 RX ORDER — HYDROCODONE BITARTRATE AND ACETAMINOPHEN 5; 325 MG/1; MG/1
5-325 TABLET ORAL
COMMUNITY
Start: 2022-12-09

## 2022-12-30 RX ORDER — METOPROLOL TARTRATE 50 MG/1
50 TABLET, FILM COATED ORAL 2 TIMES DAILY
Qty: 180 TABLET | Refills: 1 | Status: SHIPPED | OUTPATIENT
Start: 2022-12-30

## 2022-12-30 RX ORDER — AMLODIPINE BESYLATE 5 MG/1
5 TABLET ORAL DAILY
Qty: 90 TABLET | Refills: 1 | Status: SHIPPED | OUTPATIENT
Start: 2022-12-30

## 2022-12-30 RX ORDER — SIMVASTATIN 40 MG
40 TABLET ORAL DAILY
Qty: 90 TABLET | Refills: 1 | Status: SHIPPED | OUTPATIENT
Start: 2022-12-30

## 2022-12-30 RX ORDER — CITALOPRAM 20 MG/1
20 TABLET ORAL DAILY
Qty: 90 TABLET | Refills: 1 | Status: SHIPPED | OUTPATIENT
Start: 2022-12-30

## 2022-12-30 RX ORDER — PANTOPRAZOLE SODIUM 20 MG/1
20 TABLET, DELAYED RELEASE ORAL DAILY
Qty: 90 TABLET | Refills: 1 | Status: SHIPPED | OUTPATIENT
Start: 2022-12-30

## 2022-12-30 NOTE — PROGRESS NOTES
The ABCs of the Annual Wellness Visit  Subsequent Medicare Wellness Visit    Subjective      Rupa Verma is a 67 y.o. male who presents for a Subsequent Medicare Wellness Visit.    The following portions of the patient's history were reviewed and   updated as appropriate: allergies, current medications, past family history, past medical history, past social history, past surgical history and problem list.    Compared to one year ago, the patient feels his physical   health is the same.    Compared to one year ago, the patient feels his mental   health is the same.    Recent Hospitalizations:  He was not admitted to the hospital during the last year.       Current Medical Providers:  Patient Care Team:  Carol Ann Sapp APRN as PCP - General (Family Medicine)  Laureano Weathers MD as Consulting Physician (Pulmonary Disease)  Rj Fritz MD as Consulting Physician (Pulmonary Disease)    Outpatient Medications Prior to Visit   Medication Sig Dispense Refill   • aspirin 81 MG tablet aspirin 81 mg tablet,delayed release     • HYDROcodone-acetaminophen (NORCO) 5-325 MG per tablet 5-325 tablets.     • amLODIPine (NORVASC) 5 MG tablet Take 1 tablet by mouth Daily. 90 tablet 1   • citalopram (CeleXA) 20 MG tablet Take 1 tablet by mouth Daily. 90 tablet 1   • metFORMIN (GLUCOPHAGE) 500 MG tablet Take 1 tablet by mouth Daily With Breakfast. 90 tablet 1   • metoprolol tartrate (LOPRESSOR) 50 MG tablet Take 1 tablet by mouth 2 (Two) Times a Day. 180 tablet 1   • pantoprazole (PROTONIX) 20 MG EC tablet TAKE ONE TABLET BY MOUTH EVERY DAY 90 tablet 1   • simvastatin (ZOCOR) 20 MG tablet Take 1 tablet by mouth Daily. 90 tablet 1     Facility-Administered Medications Prior to Visit   Medication Dose Route Frequency Provider Last Rate Last Admin   • cyanocobalamin injection 1,000 mcg  1,000 mcg Intramuscular Q7 Days Carol Ann Sapp APRN   1,000 mcg at 12/12/22 1122   • cyanocobalamin injection 1,000 mcg   "1,000 mcg Intramuscular Q28 Days Carol Ann Sapp, APRN   1,000 mcg at 08/30/22 1041       Opioid medication/s are on active medication list.  and I have evaluated his active treatment plan and pain score trends (see table).  There were no vitals filed for this visit.  I have reviewed the chart for potential of high risk medication and harmful drug interactions in the elderly.            Aspirin is on active medication list. Aspirin use is indicated based on review of current medical condition/s. Pros and cons of this therapy have been discussed today. Benefits of this medication outweigh potential harm.  Patient has been encouraged to continue taking this medication.  .      Patient Active Problem List   Diagnosis   • Lumbar back pain   • Gastroesophageal reflux disease without esophagitis   • Essential hypertension   • Hyperlipidemia LDL goal <100   • Left upper lobe pulmonary nodule   • History of primary tuberculosis   • Personal history of nicotine dependence   • Panlobular emphysema (HCC)   • Cervical paraspinal muscle spasm   • Annual physical exam   • Obstructive sleep apnea     Advance Care Planning  Advance Directive is not on file.  ACP discussion was declined by the patient. Patient does not have an advance directive, declines further assistance.     Objective    Vitals:    12/30/22 0826   BP: 111/57   BP Location: Left arm   Patient Position: Sitting   Cuff Size: Adult   Pulse: 66   Temp: 97.5 °F (36.4 °C)   SpO2: 96%   Weight: 104 kg (229 lb 3.2 oz)   Height: 185.4 cm (73\")     Estimated body mass index is 30.24 kg/m² as calculated from the following:    Height as of this encounter: 185.4 cm (73\").    Weight as of this encounter: 104 kg (229 lb 3.2 oz).    BMI is >= 30 and <35. (Class 1 Obesity). The following options were offered after discussion;: exercise counseling/recommendations and nutrition counseling/recommendations      Does the patient have evidence of cognitive impairment?   No    Lab " Results   Component Value Date    CHLPL 177 12/29/2022    TRIG 194 (H) 12/29/2022    HDL 35 (L) 12/29/2022     (H) 12/29/2022    VLDL 34 12/29/2022    HGBA1C 6.3 (H) 12/29/2022          HEALTH RISK ASSESSMENT    Smoking Status:  Social History     Tobacco Use   Smoking Status Every Day   • Packs/day: 1.00   • Years: 50.00   • Pack years: 50.00   • Types: Cigarettes   • Start date: 1972   Smokeless Tobacco Never     Alcohol Consumption:  Social History     Substance and Sexual Activity   Alcohol Use Yes    Comment: occ     Fall Risk Screen:    STEADI Fall Risk Assessment was completed, and patient is at LOW risk for falls.Assessment completed on:12/30/2022    Depression Screening:  PHQ-2/PHQ-9 Depression Screening 12/30/2022   Little Interest or Pleasure in Doing Things 0-->not at all   Feeling Down, Depressed or Hopeless 0-->not at all   PHQ-9: Brief Depression Severity Measure Score 0       Health Habits and Functional and Cognitive Screening:  Functional & Cognitive Status 12/30/2022   Do you have difficulty preparing food and eating? No   Do you have difficulty bathing yourself, getting dressed or grooming yourself? No   Do you have difficulty using the toilet? No   Do you have difficulty moving around from place to place? No   Do you have trouble with steps or getting out of a bed or a chair? No   Current Diet Well Balanced Diet   Dental Exam Up to date   Eye Exam Not up to date   Exercise (times per week) 0 times per week   Current Exercises Include No Regular Exercise;Yard Work   Current Exercise Activities Include -   Do you need help using the phone?  No   Are you deaf or do you have serious difficulty hearing?  No   Do you need help with transportation? No   Do you need help shopping? No   Do you need help preparing meals?  No   Do you need help with housework?  No   Do you need help with laundry? No   Do you need help taking your medications? No   Do you need help managing money? No   Do you ever  drive or ride in a car without wearing a seat belt? No   Have you felt unusual stress, anger or loneliness in the last month? No   Who do you live with? Spouse   If you need help, do you have trouble finding someone available to you? No   Have you been bothered in the last four weeks by sexual problems? No   Do you have difficulty concentrating, remembering or making decisions? No       Age-appropriate Screening Schedule:  Refer to the list below for future screening recommendations based on patient's age, sex and/or medical conditions. Orders for these recommended tests are listed in the plan section. The patient has been provided with a written plan.    Health Maintenance   Topic Date Due   • INFLUENZA VACCINE  03/31/2023 (Originally 8/1/2022)   • TDAP/TD VACCINES (1 - Tdap) 12/30/2023 (Originally 4/12/1975)   • ZOSTER VACCINE (1 of 2) 12/30/2023 (Originally 4/12/2006)   • LIPID PANEL  12/29/2023                CMS Preventative Services Quick Reference  Risk Factors Identified During Encounter:    Immunizations Discussed/Encouraged: Tdap, Influenza, Prevnar 20 (Pneumococcal 20-valent conjugate), Shingrix and COVID19  Inactivity/Sedentary: Patient was advised to exercise at least 150 minutes a week per CDC recommendations.  Polypharmacy: Medication List reviewed and Medications are appropriate for patient  Tobacco Use/Dependance Risk (use dotphrase .tobaccocessation for documentation)  Vision Screening Recommended    The above risks/problems have been discussed with the patient.  Pertinent information has been shared with the patient in the After Visit Summary.    Rupa Verma  reports that he has been smoking cigarettes. He started smoking about 51 years ago. He has a 50.00 pack-year smoking history. He has never used smokeless tobacco.. I have educated him on the risk of diseases from using tobacco products such as cancer, COPD, heart disease and cataracts.     I advised him to quit and he is not willing to  quit.    I spent 3  minutes counseling the patient.         Diagnoses and all orders for this visit:    1. Encounter for Medicare annual wellness exam (Primary)    2. Hyperlipidemia LDL goal <100  -     simvastatin (ZOCOR) 40 MG tablet; Take 1 tablet by mouth Daily.  Dispense: 90 tablet; Refill: 1    3. Essential hypertension  -     amLODIPine (NORVASC) 5 MG tablet; Take 1 tablet by mouth Daily.  Dispense: 90 tablet; Refill: 1  -     metoprolol tartrate (LOPRESSOR) 50 MG tablet; Take 1 tablet by mouth 2 (Two) Times a Day.  Dispense: 180 tablet; Refill: 1    4. Other depression  -     citalopram (CeleXA) 20 MG tablet; Take 1 tablet by mouth Daily.  Dispense: 90 tablet; Refill: 1    5. Gastroesophageal reflux disease, unspecified whether esophagitis present  -     pantoprazole (PROTONIX) 20 MG EC tablet; Take 1 tablet by mouth Daily.  Dispense: 90 tablet; Refill: 1    6. Prediabetes  -     metFORMIN (GLUCOPHAGE) 500 MG tablet; Take 1 tablet by mouth Daily With Breakfast.  Dispense: 90 tablet; Refill: 1    7. Obesity (BMI 30.0-34.9)    Patient encouraged to partake of healthy diet rich in fresh fruits and vegetables as well as lean proteins.  Patient encouraged to participate in daily exercise with goal of 30 min sustained activity.    Follow Up:   Next Medicare Wellness visit to be scheduled in 1 year.      An After Visit Summary and PPPS were made available to the patient.

## 2023-01-10 ENCOUNTER — CLINICAL SUPPORT (OUTPATIENT)
Dept: FAMILY MEDICINE CLINIC | Facility: CLINIC | Age: 68
End: 2023-01-10
Payer: MEDICARE

## 2023-01-10 NOTE — PROGRESS NOTES
Patient presented to the office for 1,000mg of B12. Injected into left arm. Patient tolerated with no reactions

## 2023-02-08 ENCOUNTER — CLINICAL SUPPORT (OUTPATIENT)
Dept: FAMILY MEDICINE CLINIC | Facility: CLINIC | Age: 68
End: 2023-02-08
Payer: MEDICARE

## 2023-02-08 PROCEDURE — 96372 THER/PROPH/DIAG INJ SC/IM: CPT | Performed by: NURSE PRACTITIONER

## 2023-02-08 RX ADMIN — CYANOCOBALAMIN 1000 MCG: 1000 INJECTION, SOLUTION INTRAMUSCULAR; SUBCUTANEOUS at 13:29

## 2023-03-15 ENCOUNTER — CLINICAL SUPPORT (OUTPATIENT)
Dept: FAMILY MEDICINE CLINIC | Facility: CLINIC | Age: 68
End: 2023-03-15
Payer: MEDICARE

## 2023-03-15 DIAGNOSIS — Z87.891 PERSONAL HISTORY OF NICOTINE DEPENDENCE: ICD-10-CM

## 2023-03-15 PROCEDURE — 96372 THER/PROPH/DIAG INJ SC/IM: CPT | Performed by: NURSE PRACTITIONER

## 2023-03-15 RX ADMIN — CYANOCOBALAMIN 1000 MCG: 1000 INJECTION, SOLUTION INTRAMUSCULAR; SUBCUTANEOUS at 11:30

## 2023-03-15 NOTE — PROGRESS NOTES
Patient presented to office and received a B-12 Deltoid injection in the right arm. Patient tolerated well

## 2023-04-14 ENCOUNTER — CLINICAL SUPPORT (OUTPATIENT)
Dept: FAMILY MEDICINE CLINIC | Facility: CLINIC | Age: 68
End: 2023-04-14
Payer: MEDICARE

## 2023-04-14 RX ADMIN — CYANOCOBALAMIN 1000 MCG: 1000 INJECTION, SOLUTION INTRAMUSCULAR; SUBCUTANEOUS at 13:21

## 2023-04-21 DIAGNOSIS — E78.5 HYPERLIPIDEMIA LDL GOAL <100: ICD-10-CM

## 2023-04-21 RX ORDER — SIMVASTATIN 40 MG
TABLET ORAL
Qty: 90 TABLET | Refills: 1 | Status: SHIPPED | OUTPATIENT
Start: 2023-04-21

## 2023-04-21 NOTE — TELEPHONE ENCOUNTER
Rx Refill Note  Requested Prescriptions     Pending Prescriptions Disp Refills   • simvastatin (ZOCOR) 40 MG tablet [Pharmacy Med Name: SIMVASTATIN 40MG TABS] 90 tablet 1     Sig: TAKE ONE TABLET BY MOUTH EVERY DAY      Last office visit with prescribing clinician: 12/30/2022   Last telemedicine visit with prescribing clinician: 6/30/2023   Next office visit with prescribing clinician: 6/30/2023     Protocol met      Leonora Matson MA  04/21/23, 09:56 CDT

## 2023-05-08 ENCOUNTER — CLINICAL SUPPORT (OUTPATIENT)
Dept: FAMILY MEDICINE CLINIC | Facility: CLINIC | Age: 68
End: 2023-05-08
Payer: MEDICARE

## 2023-05-08 DIAGNOSIS — E53.8 B12 DEFICIENCY: ICD-10-CM

## 2023-05-08 PROCEDURE — 96372 THER/PROPH/DIAG INJ SC/IM: CPT | Performed by: NURSE PRACTITIONER

## 2023-05-08 RX ADMIN — CYANOCOBALAMIN 1000 MCG: 1000 INJECTION, SOLUTION INTRAMUSCULAR; SUBCUTANEOUS at 13:18

## 2023-05-08 NOTE — PROGRESS NOTES
Pt presented in office today in order to receive his monthly vitamin b-12 injection, injection given in left deltoid, pt tolerated well

## 2023-06-05 ENCOUNTER — HOSPITAL ENCOUNTER (OUTPATIENT)
Dept: CT IMAGING | Facility: HOSPITAL | Age: 68
Discharge: HOME | End: 2023-06-05
Admitting: INTERNAL MEDICINE
Payer: MEDICARE

## 2023-06-05 DIAGNOSIS — R91.1 RIGHT UPPER LOBE PULMONARY NODULE: ICD-10-CM

## 2023-06-05 PROCEDURE — 71250 CT THORAX DX C-: CPT

## 2023-06-05 NOTE — PROGRESS NOTES
Please call the patient regarding his abnormal result.  Nodule is still of concern.  We will discuss what to do going forward at office visit Wednesday.

## 2023-06-07 ENCOUNTER — OFFICE VISIT (OUTPATIENT)
Dept: PULMONOLOGY | Facility: CLINIC | Age: 68
End: 2023-06-07
Payer: MEDICARE

## 2023-06-07 ENCOUNTER — PREP FOR SURGERY (OUTPATIENT)
Dept: PULMONOLOGY | Facility: CLINIC | Age: 68
End: 2023-06-07

## 2023-06-07 VITALS
SYSTOLIC BLOOD PRESSURE: 124 MMHG | HEIGHT: 73 IN | WEIGHT: 230 LBS | BODY MASS INDEX: 30.48 KG/M2 | DIASTOLIC BLOOD PRESSURE: 78 MMHG | HEART RATE: 65 BPM | OXYGEN SATURATION: 96 %

## 2023-06-07 DIAGNOSIS — R91.1 RIGHT UPPER LOBE PULMONARY NODULE: Primary | ICD-10-CM

## 2023-06-07 DIAGNOSIS — J84.10 PULMONARY FIBROSIS: ICD-10-CM

## 2023-06-07 DIAGNOSIS — J43.1 PANLOBULAR EMPHYSEMA: ICD-10-CM

## 2023-06-07 DIAGNOSIS — Z86.11: ICD-10-CM

## 2023-06-07 PROCEDURE — 3078F DIAST BP <80 MM HG: CPT | Performed by: INTERNAL MEDICINE

## 2023-06-07 PROCEDURE — 3074F SYST BP LT 130 MM HG: CPT | Performed by: INTERNAL MEDICINE

## 2023-06-07 PROCEDURE — 99214 OFFICE O/P EST MOD 30 MIN: CPT | Performed by: INTERNAL MEDICINE

## 2023-06-07 NOTE — H&P
"Background:  pt w mod min symptomatic copd, rul nodule, hx tb w scarring BIJAL   Chief Complaint  Panlobular emphysema    Subjective    History of Present Illness     Rupa Verma is here for follow up with [facility] PULMONARY & CRITICAL CARE MEDICINE.  History of Present Illness  He follows up after his newest ct chest for follow up of right upper lobe nodule.  This is asymptomatic.  He has a prior history of tuberculosis diagnosed approximately 11 years ago.  At that time he received a full course of therapy through the health department.  There were no interruptions in therapy and he did complete a full course.  He has had no symptoms of tuberculosis since that time.  He has an intermittent cough with intermittent scant sputum production.  No hemoptysis no fevers no weight loss.  His new his chest CT done yesterday shows interval increase in size of the nodule in the right upper lobe.  No adenopathy.   Tobacco Use: High Risk    Smoking Tobacco Use: Every Day    Smokeless Tobacco Use: Never    Passive Exposure: Not on file      Current Outpatient Medications   Medication Instructions    amLODIPine (NORVASC) 5 mg, Oral, Daily    aspirin 81 MG tablet aspirin 81 mg tablet,delayed release    citalopram (CELEXA) 20 mg, Oral, Daily    HYDROcodone-acetaminophen (NORCO) 5-325 MG per tablet 5-325 tablets    metFORMIN (GLUCOPHAGE) 500 mg, Oral, Daily With Breakfast    metoprolol tartrate (LOPRESSOR) 50 mg, Oral, 2 Times Daily    pantoprazole (PROTONIX) 20 mg, Oral, Daily    simvastatin (ZOCOR) 40 MG tablet TAKE ONE TABLET BY MOUTH EVERY DAY      Objective     Vital Signs:   /78   Pulse 65   Ht 185.4 cm (73\")   Wt 104 kg (230 lb)   SpO2 96%   BMI 30.34 kg/m²   Physical Exam  Constitutional:       Appearance: Normal appearance. He is not ill-appearing or diaphoretic.   Eyes:      Extraocular Movements: Extraocular movements intact.   Pulmonary:      Effort: Pulmonary effort is normal. No " respiratory distress.      Breath sounds: No wheezing, rhonchi or rales.   Neurological:      Mental Status: He is alert.      Result Review  Data Reviewed:    CT Chest Without Contrast Diagnostic    Result Date: 6/5/2023  Impression: 1. Interval increase in size of a spiculated nodule in the right upper lobe, currently measuring 1.6 cm, compared with 10 mm on the previous chest CT and PET/CT. This is suspicious for pulmonary neoplasm and tissue diagnosis is recommended. The nodule is relatively peripheral, may possibly be amenable to navigational bronchoscopy and biopsy, versus CT-guided biopsy. 2. No evidence of intrathoracic lymphadenopathy. Stellate densities in the left upper lobe seen on previous imaging is stable and likely related to areas of focal scarring. 3. Heavy calcified plaque is noted within the LAD coronary artery distribution as before. There is also heavy calcified plaque at the origin of the left ICA, follow-up with CTA to evaluate the carotid arteries may be appropriate.  Note: This examination has been flagged in the PACS system for follow-up of the spiculated lesion in the right upper lobe.     This report was finalized on 06/05/2023 11:46 by Dr. Barry Hill MD.     PFT Values          12/5/2022    09:00   Pre Drug PFT Results   FVC 86   FEV1 73   FEF 25-75% 54   FEV1/FVC 64                Assessment and Plan      Diagnoses and all orders for this visit:    1. Right upper lobe pulmonary nodule (Primary)  -     AFB Culture - Sputum, Cough; Future  -     AFB Culture - Sputum, Cough; Future  -     AFB Culture - Sputum, Cough; Future    2. History of primary tuberculosis  -     AFB Culture - Sputum, Cough; Future  -     AFB Culture - Sputum, Cough; Future  -     AFB Culture - Sputum, Cough; Future    3. Panlobular emphysema    4. Pulmonary fibrosis      Right upper lobe nodule is progressive.  This is worrisome for malignancy.  There is prior history of tuberculosis but this was treated and  there are no current symptoms of TB.  For completeness we will have him try to collect 3 sputum's for AFB.  We will plan ION robotic bronchoscopy on June 26 to sample the lesion.  Risk benefits and alternatives were discussed with the patient.  Particular emphasis was placed on risk of bleeding and pneumothorax.  Hold asa 3 days prior to the procedure      Follow Up     Return in about 5 weeks (around 7/12/2023).  Patient was given instructions and counseling regarding his condition or for health maintenance advice. Please see specific information pulled into the AVS if appropriate.    Electronically signed by Rj Fritz MD, 6/7/2023, 17:14 CDT

## 2023-06-07 NOTE — PROGRESS NOTES
"Background:  pt w mod min symptomatic copd, rul nodule, hx tb w scarring BIJAL   Chief Complaint  Panlobular emphysema    Subjective    History of Present Illness     Rupa Verma is here for follow up with [facility] PULMONARY & CRITICAL CARE MEDICINE.  History of Present Illness  He follows up after his newest ct chest for follow up of right upper lobe nodule.  This is asymptomatic.  He has a prior history of tuberculosis diagnosed approximately 11 years ago.  At that time he received a full course of therapy through the health department.  There were no interruptions in therapy and he did complete a full course.  He has had no symptoms of tuberculosis since that time.  He has an intermittent cough with intermittent scant sputum production.  No hemoptysis no fevers no weight loss.  His new his chest CT done yesterday shows interval increase in size of the nodule in the right upper lobe.  No adenopathy.   Tobacco Use: High Risk    Smoking Tobacco Use: Every Day    Smokeless Tobacco Use: Never    Passive Exposure: Not on file      Current Outpatient Medications   Medication Instructions    amLODIPine (NORVASC) 5 mg, Oral, Daily    aspirin 81 MG tablet aspirin 81 mg tablet,delayed release    citalopram (CELEXA) 20 mg, Oral, Daily    HYDROcodone-acetaminophen (NORCO) 5-325 MG per tablet 5-325 tablets    metFORMIN (GLUCOPHAGE) 500 mg, Oral, Daily With Breakfast    metoprolol tartrate (LOPRESSOR) 50 mg, Oral, 2 Times Daily    pantoprazole (PROTONIX) 20 mg, Oral, Daily    simvastatin (ZOCOR) 40 MG tablet TAKE ONE TABLET BY MOUTH EVERY DAY      Objective     Vital Signs:   /78   Pulse 65   Ht 185.4 cm (73\")   Wt 104 kg (230 lb)   SpO2 96%   BMI 30.34 kg/m²   Physical Exam  Constitutional:       Appearance: Normal appearance. He is not ill-appearing or diaphoretic.   Eyes:      Extraocular Movements: Extraocular movements intact.   Pulmonary:      Effort: Pulmonary effort is normal. No " respiratory distress.      Breath sounds: No wheezing, rhonchi or rales.   Neurological:      Mental Status: He is alert.      Result Review  Data Reviewed:    CT Chest Without Contrast Diagnostic    Result Date: 6/5/2023  Impression: 1. Interval increase in size of a spiculated nodule in the right upper lobe, currently measuring 1.6 cm, compared with 10 mm on the previous chest CT and PET/CT. This is suspicious for pulmonary neoplasm and tissue diagnosis is recommended. The nodule is relatively peripheral, may possibly be amenable to navigational bronchoscopy and biopsy, versus CT-guided biopsy. 2. No evidence of intrathoracic lymphadenopathy. Stellate densities in the left upper lobe seen on previous imaging is stable and likely related to areas of focal scarring. 3. Heavy calcified plaque is noted within the LAD coronary artery distribution as before. There is also heavy calcified plaque at the origin of the left ICA, follow-up with CTA to evaluate the carotid arteries may be appropriate.  Note: This examination has been flagged in the PACS system for follow-up of the spiculated lesion in the right upper lobe.     This report was finalized on 06/05/2023 11:46 by Dr. Barry Hill MD.     PFT Values          12/5/2022    09:00   Pre Drug PFT Results   FVC 86   FEV1 73   FEF 25-75% 54   FEV1/FVC 64                Assessment and Plan      Diagnoses and all orders for this visit:    1. Right upper lobe pulmonary nodule (Primary)  -     AFB Culture - Sputum, Cough; Future  -     AFB Culture - Sputum, Cough; Future  -     AFB Culture - Sputum, Cough; Future    2. History of primary tuberculosis  -     AFB Culture - Sputum, Cough; Future  -     AFB Culture - Sputum, Cough; Future  -     AFB Culture - Sputum, Cough; Future    3. Panlobular emphysema    4. Pulmonary fibrosis      Right upper lobe nodule is progressive.  This is worrisome for malignancy.  There is prior history of tuberculosis but this was treated and  there are no current symptoms of TB.  For completeness we will have him try to collect 3 sputum's for AFB.  We will plan ION robotic bronchoscopy on June 26 to sample the lesion.  Risk benefits and alternatives were discussed with the patient.  Particular emphasis was placed on risk of bleeding and pneumothorax.  Hold asa 3 days prior to the procedure      Follow Up     Return in about 5 weeks (around 7/12/2023).  Patient was given instructions and counseling regarding his condition or for health maintenance advice. Please see specific information pulled into the AVS if appropriate.    Electronically signed by Rj Fritz MD, 6/7/2023, 17:14 CDT

## 2023-06-26 PROBLEM — R59.0 HILAR ADENOPATHY: Status: ACTIVE | Noted: 2023-06-26

## 2023-07-13 PROBLEM — C34.91 ADENOCARCINOMA, LUNG, RIGHT: Status: ACTIVE | Noted: 2023-07-13

## 2023-07-13 NOTE — H&P (VIEW-ONLY)
Thoracic Surgery Consultation    Referring physician: Dr. Rj Fritz      Primary care physician: ARIAN Waller    He is accompained by an adult female.       Chief Complaint   Patient presents with    Adenocarcinoma of right Lung     New patient referred from Dr. Fritz         Subjective     History of Present Illness    The patient states he is feeling a little better. He states he is coughing up blood. He reports he completed x-rays requested by Dr. Fritz. He adds he is uncertain on why he was requested to complete the x-rays. He states he has been smoking since he was 15 years old, and he smokes a pack a day.  He reports he is short of breath. He reports he is uncertain if he would have to take a break walking up a flight of stairs. He denies previous surgeries on his heart, lungs, or chest. He currently denies heart problems. He reports he takes aspirin.        Review of Systems     A complete review of systems was performed, is negative except stated above.    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    Hypertension     Left upper lobe pulmonary nodule 09/12/2019     Past Surgical History:   Procedure Laterality Date    BRONCHOSCOPY WITH ION ROBOTIC ASSIST N/A 6/26/2023    Procedure: BRONCHOSCOPY WITH ION ROBOT;  Surgeon: Rj Fritz MD;  Location: [facility] OR;  Service: Robotics - Pulmonary;  Laterality: N/A;  pre lung mass   post lung mass   Carol Ann Sapp    CHOLECYSTECTOMY      COLONOSCOPY      ENDOSCOPY      EYE SURGERY Right     cataract surgery    PATELLA FRACTURE SURGERY Left      Family History   Problem Relation Age of Onset    Hypertension Mother     No Known Problems Father     Diabetes Neg Hx     Cancer Neg Hx     Stroke Neg Hx      Social History     Tobacco Use    Smoking status: Every Day     Packs/day: 1.00     Years: 53.00     Pack years:  "53.00     Types: Cigarettes     Start date: 1970    Smokeless tobacco: Never   Vaping Use    Vaping Use: Never used   Substance Use Topics    Alcohol use: Yes     Alcohol/week: 42.0 standard drinks     Types: 42 Cans of beer per week     Comment: 6 pack of beer per day    Drug use: No     Current Outpatient Medications   Medication Sig Dispense Refill    amLODIPine (NORVASC) 5 MG tablet Take 1 tablet by mouth Daily. 90 tablet 1    aspirin 81 MG tablet aspirin 81 mg tablet,delayed release      citalopram (CeleXA) 20 MG tablet Take 1 tablet by mouth Daily. 90 tablet 1    gabapentin (NEURONTIN) 300 MG capsule Take 1 capsule by mouth 3 (Three) Times a Day.      HYDROcodone-acetaminophen (NORCO) 7.5-325 MG per tablet Take 1 tablet by mouth Every 12 (Twelve) Hours As Needed.      metFORMIN (GLUCOPHAGE) 500 MG tablet Take 1 tablet by mouth Daily With Breakfast. 90 tablet 1    metoprolol tartrate (LOPRESSOR) 50 MG tablet Take 1 tablet by mouth 2 (Two) Times a Day. 180 tablet 1    naloxone (NARCAN) 4 MG/0.1ML nasal spray 1 spray into the nostril(s) as directed by provider As Needed. For Hydrocodone prescription      pantoprazole (PROTONIX) 20 MG EC tablet Take 1 tablet by mouth Daily. 90 tablet 1    simvastatin (ZOCOR) 40 MG tablet Take 1 tablet by mouth Daily. 90 tablet 1     Current Facility-Administered Medications   Medication Dose Route Frequency Provider Last Rate Last Admin    cyanocobalamin injection 1,000 mcg  1,000 mcg Intramuscular Q28 Days Carol Ann Sapp APRN   1,000 mcg at 06/30/23 0912     Allergies:  Patient has no known allergies.    Objective      Vital Signs  Visit Vitals  /64 (BP Location: Right arm, Patient Position: Sitting, Cuff Size: Adult)   Pulse 75   Ht 187 cm (73.62\")   Wt 105 kg (232 lb)   SpO2 94%   BMI 30.09 kg/m²         Physical Exam  /64 (BP Location: Right arm, Patient Position: Sitting, Cuff Size: Adult)   Pulse 75   Ht 187 cm (73.62\")   Wt 105 kg (232 lb)   SpO2 94%  "  BMI 30.09 kg/m²     General Appearance:  Alert, cooperative, no distress, appears stated age   Head:  Normocephalic, without obvious abnormality, atraumatic   Eyes:  PERRL, conjunctiva/corneas clear, EOM's intact, fundi benign, both eyes   Ears:  Normal TM's and external ear canals, both ears   Nose: Nares normal, septum midline, mucosa normal, no drainage or sinus tenderness   Throat: Lips, mucosa, and tongue normal; teeth and gums normal   Neck: Supple, symmetrical, trachea midline, no adenopathy, thyroid: not enlarged, symmetric, no tenderness/mass/nodules, no carotid bruit or JVD   Back:   Symmetric, no curvature, ROM normal, no CVA tenderness   Lungs:   Clear to auscultation bilaterally, respirations unlabored   Chest Wall:  No tenderness or deformity   Heart:  Regular rate and rhythm, S1, S2 normal, no murmur, rub or gallop   Abdomen:   Soft, non-tender, bowel sounds active all four quadrants,  no masses, no organomegaly   Genitalia:  Normal male   Rectal:  Normal tone, normal prostate, no masses or tenderness;  guaiac negative stool   Extremities: Extremities normal, atraumatic, no cyanosis or edema   Pulses: 2+ and symmetric   Skin: Skin color, texture, turgor normal, no rashes or lesions   Lymph nodes: Cervical, supraclavicular, and axillary nodes normal   Neurologic: Normal       Results Review:     WBC   Date Value Ref Range Status   06/19/2023 9.56 3.40 - 10.80 10*3/mm3 Final   12/29/2022 10.3 3.4 - 10.8 x10E3/uL Final     RBC   Date Value Ref Range Status   06/19/2023 5.07 4.14 - 5.80 10*6/mm3 Final   12/29/2022 5.37 4.14 - 5.80 x10E6/uL Final     Hemoglobin   Date Value Ref Range Status   06/19/2023 15.7 13.0 - 17.7 g/dL Final     Hematocrit   Date Value Ref Range Status   06/19/2023 48.7 37.5 - 51.0 % Final     MCV   Date Value Ref Range Status   06/19/2023 96.1 79.0 - 97.0 fL Final     MCH   Date Value Ref Range Status   06/19/2023 31.0 26.6 - 33.0 pg Final     MCHC   Date Value Ref Range Status    06/19/2023 32.2 31.5 - 35.7 g/dL Final     RDW   Date Value Ref Range Status   06/19/2023 13.2 12.3 - 15.4 % Final     RDW-SD   Date Value Ref Range Status   06/19/2023 46.9 37.0 - 54.0 fl Final     MPV   Date Value Ref Range Status   06/19/2023 10.4 6.0 - 12.0 fL Final     Platelets   Date Value Ref Range Status   06/19/2023 226 140 - 450 10*3/mm3 Final     Neutrophil Rel %   Date Value Ref Range Status   12/29/2022 73 Not Estab. % Final     Lymphocyte Rel %   Date Value Ref Range Status   12/29/2022 13 Not Estab. % Final     Monocyte Rel %   Date Value Ref Range Status   12/29/2022 10 Not Estab. % Final     Eosinophil Rel %   Date Value Ref Range Status   12/29/2022 2 Not Estab. % Final     Basophil Rel %   Date Value Ref Range Status   12/29/2022 1 Not Estab. % Final     Neutrophils Absolute   Date Value Ref Range Status   12/29/2022 7.6 (H) 1.4 - 7.0 x10E3/uL Final     Lymphocytes Absolute   Date Value Ref Range Status   12/29/2022 1.3 0.7 - 3.1 x10E3/uL Final     Monocytes Absolute   Date Value Ref Range Status   12/29/2022 1.0 (H) 0.1 - 0.9 x10E3/uL Final     Eosinophils Absolute   Date Value Ref Range Status   12/29/2022 0.2 0.0 - 0.4 x10E3/uL Final     Basophils Absolute   Date Value Ref Range Status   12/29/2022 0.1 0.0 - 0.2 x10E3/uL Final     Glucose   Date Value Ref Range Status   06/19/2023 110 (H) 65 - 99 mg/dL Final     Sodium   Date Value Ref Range Status   06/19/2023 137 136 - 145 mmol/L Final     Potassium   Date Value Ref Range Status   06/19/2023 4.2 3.5 - 5.2 mmol/L Final     CO2   Date Value Ref Range Status   06/19/2023 24.0 22.0 - 29.0 mmol/L Final     Chloride   Date Value Ref Range Status   06/19/2023 100 98 - 107 mmol/L Final     Anion Gap   Date Value Ref Range Status   06/19/2023 13.0 5.0 - 15.0 mmol/L Final     Creatinine   Date Value Ref Range Status   06/19/2023 0.76 0.76 - 1.27 mg/dL Final     BUN   Date Value Ref Range Status   06/19/2023 12 8 - 23 mg/dL Final     BUN/Creatinine  Ratio   Date Value Ref Range Status   06/19/2023 15.8 7.0 - 25.0 Final     Calcium   Date Value Ref Range Status   06/19/2023 9.3 8.6 - 10.5 mg/dL Final     eGFR Non  Am   Date Value Ref Range Status   12/27/2021 82 >59 mL/min/1.73 Final     Alkaline Phosphatase   Date Value Ref Range Status   12/29/2022 74 44 - 121 IU/L Final     ALT (SGPT)   Date Value Ref Range Status   12/29/2022 45 (H) 0 - 44 IU/L Final     AST (SGOT)   Date Value Ref Range Status   12/29/2022 38 0 - 40 IU/L Final     Total Bilirubin   Date Value Ref Range Status   12/29/2022 0.6 0.0 - 1.2 mg/dL Final     Albumin   Date Value Ref Range Status   12/29/2022 4.3 3.8 - 4.8 g/dL Final     A/G Ratio   Date Value Ref Range Status   12/29/2022 1.6 1.2 - 2.2 Final        I reviewed the patient's clinical results and discussed with patient.    CT Chest::FINDINGS:     Redemonstrated spiculated 1.7 cm RIGHT upper lobe pulmonary nodule on  image 77. This nodule is increase in size compared to a study from  10/08/2022 at which time it measured 10 mm. No change in LEFT upper lobe  8 mm nodule on image 47 and groundglass 8 mm nodule on image 48.     Central airways are clear. No consolidation or pleural effusion.  Moderate emphysema.     No enlarged thoracic lymph nodes. Main pulmonary artery is nondilated.  Thoracic aorta is nonaneurysmal. Moderate coronary calcification. No  pericardial effusion.     No large thyroid nodule. No acute chest wall soft tissue abnormality.  Prior cholecystectomy. LEFT renal cyst no acute osseous finding.     IMPRESSION:     1.  1.7 cm spiculated RIGHT upper lobe pulmonary nodule, highly  suspicious for neoplasms.      2.  No evidence of metastatic disease in chest.  This report was finalized on 06/26/2023 13:38 by Dr. Gerry Byrne MD.    PFTs:      PET Scan:Findings:  Background right hepatic lobe metabolic activity measures max SUV 3.8.     *  10 mm RIGHT upper lobe pulmonary nodule on image 151 has minimal  metabolic  activity with max SUV of 2.1, far below physiologic background  level.     *  16 mm spiculated scarlike nodule in the LEFT upper lobe on image 159  and 168 has minimal metabolic activity with max SUV of 1.1, far below  physiologic background.     *  No hypermetabolic pulmonary nodule. No enlarged or hypermetabolic  thoracic lymph nodes.     *  No abnormal hypermetabolic activity in the neck.     *  No abnormal hypermetabolic activity in the abdomen or pelvis.     Non-FDG avid findings:     No acute orbital finding. Parotid glands demonstrate fatty replacement  but are otherwise unremarkable. Parapharyngeal fat planes are  maintained. No focal asymmetry in the aerodigestive tract. No large  thyroid nodule. No cervical lymphadenopathy. Mastoid air cells and  visualized paranasal sinuses are clear.     The central airways are clear. No consolidation or pleural effusion.  Mild centrilobular emphysema. No enlarged thoracic lymph nodes. Main  pulmonary artery is nondilated. Thoracic aorta is nonaneurysmal.  Moderate to heavy coronary atherosclerotic calcification. No pericardial  effusion.     Unenhanced liver, spleen, adrenal glands, and pancreas are unremarkable.  Prior cholecystectomy. No biliary ductal dilatation. The LEFT upper pole  renal cyst. No urolithiasis or hydronephrosis. No focal urinary bladder  wall thickening.     Colonic diverticulosis without evidence of diverticulitis. Normal  appendix. Tiny hiatal hernia. No abnormal bowel distention or evidence  of active bowel inflammation. No ascites or free pelvic fluid. No pelvic  mass or pelvic collection. Small LEFT inguinal fat-containing hernia.  Prostate and seminal vesicles appear unremarkable.     Nonaneurysmal atherosclerotic abdominal aorta. No enlarged abdominal or  pelvic lymph nodes. No acute osseous finding.     IMPRESSION:     1.  10 mm RIGHT upper lobe pulmonary nodule and 16 mm spiculated  scarlike LEFT upper lobe pulmonary nodule both have only  minimal  metabolic activity far below physiologic background. Favor these to be  related to an infectious or inflammatory process but recommend continued  imaging surveillance as an indolent neoplasm remains within the  differential.     2.  No abnormal hypermetabolic activity in the neck, chest, abdomen, or  pelvis.  This report was finalized on 10/18/2022 12:42 by Dr. Gerry Byrne MD.      I personally reviewed images of following exams, the following is my interpretation:    CT Chest: There is mild to moderate emphysematous changes that is heterogeneous and more upper lobe predominant spiculated right upper lobe lung nodule near the periphery laterally. Well-developed fissures and normal hilar anatomy. No significant adenopathy appreciated  PET Scan: Low metabolic activity at nodule of interest. No evidence of mediastinal hypermetabolic activity.         Bronchoscopic Biopsy:  Final Diagnosis   Lesion, right upper lobe lung, transbronchial biopsy:  Adenocarcinoma.   Electronically signed by Mele Hinojosa MD on 7/5/2023 at 1440               Assessment & Plan     Mr. Verma is a 68-year-old male who presents to me with a right upper lobe lung cancer, adenocarcinoma after bronchoscopic biopsy by Dr. Fritz. He has a strong smoking history. He has a strong smoking history, he has smoked for over at 50 years, at least 1 pack/day. He has another left lower lobe nodule that has been unchanged and I do not suspect malignancy. He does have some shortness of breath with daily activity but is able to walk in from parking lot without break. He does not have much desire to quit smoking, we did discuss smoking cessation for greater than 5 minutes.      I discussed with him at length today the natural history of lung malignancy in cases such as his. I do not see evidence of metastatic spread, he will be a T1b N0 M0, stage I A2. Given this, I would recommend surgical resection, his nodule is borderline less than 2 cm,  but in some views looks to be about 2.1.  We did discuss treatment alternatives of stereotactic radiation and wedge resection if his pulmonary reserve is not adequate to tolerate lobectomy. He does not want to consider stereotactic radiation, he would rather have surgery. We will plan on full PFT and if those are adequate, we will proceed with robotic right upper lobectomy with mediastinal lymph node dissection. We discussed the risk and benefits of surgery and alternatives including but not limited to bleeding, infection, injury to major vessels or organs, need for transfusion, need for conversion to open operation, pneumonia, prolonged airleak, risk of anesthesia, and/or death.   He understands and wants to proceed. We will plan on surgery a week from Friday.     I have reviewed his PFT since clinic visit, he has hypoxia on room air (O2 Sat 88%) and DLCO of 44%, given this not a candidate for anatomic lobectomy.  Will proceed with wedge resection and lymph node dissection.    Thank you for trusting me with the care of  Mr. Verma.  Please do not hesitate to call with any questions or concerns.    Shaggy Philippe MD   Cardiothoracic Surgeon        Transcribed from ambient dictation for Shaggy Philippe MD by Nancy Pyle.  07/13/23   12:05 CDT    Patient or patient representative verbalized consent to the visit recording.  I have personally performed the services described in this document as transcribed by the above individual, and it is both accurate and complete.

## 2023-07-19 ENCOUNTER — PRE-ADMISSION TESTING (OUTPATIENT)
Dept: PREADMISSION TESTING | Facility: HOSPITAL | Age: 68
DRG: 165 | End: 2023-07-19
Payer: MEDICARE

## 2023-07-19 VITALS
OXYGEN SATURATION: 95 % | BODY MASS INDEX: 29.85 KG/M2 | WEIGHT: 232.59 LBS | RESPIRATION RATE: 18 BRPM | HEIGHT: 74 IN | SYSTOLIC BLOOD PRESSURE: 141 MMHG | DIASTOLIC BLOOD PRESSURE: 64 MMHG | HEART RATE: 73 BPM

## 2023-07-19 DIAGNOSIS — C34.91 ADENOCARCINOMA, LUNG, RIGHT: ICD-10-CM

## 2023-07-19 DIAGNOSIS — R06.02 SOB (SHORTNESS OF BREATH): ICD-10-CM

## 2023-07-19 LAB
ABO GROUP BLD: NORMAL
ALBUMIN SERPL-MCNC: 4.5 G/DL (ref 3.5–5.2)
ALBUMIN/GLOB SERPL: 1.7 G/DL
ALP SERPL-CCNC: 78 U/L (ref 39–117)
ALT SERPL W P-5'-P-CCNC: 45 U/L (ref 1–41)
ANION GAP SERPL CALCULATED.3IONS-SCNC: 12 MMOL/L (ref 5–15)
APTT PPP: 28 SECONDS (ref 24.1–35)
AST SERPL-CCNC: 42 U/L (ref 1–40)
BASOPHILS # BLD AUTO: 0.08 10*3/MM3 (ref 0–0.2)
BASOPHILS NFR BLD AUTO: 0.8 % (ref 0–1.5)
BILIRUB SERPL-MCNC: 0.5 MG/DL (ref 0–1.2)
BLD GP AB SCN SERPL QL: NEGATIVE
BUN SERPL-MCNC: 10 MG/DL (ref 8–23)
BUN/CREAT SERPL: 13.9 (ref 7–25)
CALCIUM SPEC-SCNC: 9.4 MG/DL (ref 8.6–10.5)
CHLORIDE SERPL-SCNC: 101 MMOL/L (ref 98–107)
CO2 SERPL-SCNC: 24 MMOL/L (ref 22–29)
CREAT SERPL-MCNC: 0.72 MG/DL (ref 0.76–1.27)
DEPRECATED RDW RBC AUTO: 45.3 FL (ref 37–54)
EGFRCR SERPLBLD CKD-EPI 2021: 100.1 ML/MIN/1.73
EOSINOPHIL # BLD AUTO: 0.16 10*3/MM3 (ref 0–0.4)
EOSINOPHIL NFR BLD AUTO: 1.6 % (ref 0.3–6.2)
ERYTHROCYTE [DISTWIDTH] IN BLOOD BY AUTOMATED COUNT: 12.8 % (ref 12.3–15.4)
GLOBULIN UR ELPH-MCNC: 2.7 GM/DL
GLUCOSE SERPL-MCNC: 104 MG/DL (ref 65–99)
HCT VFR BLD AUTO: 51.3 % (ref 37.5–51)
HGB BLD-MCNC: 16.8 G/DL (ref 13–17.7)
IMM GRANULOCYTES # BLD AUTO: 0.09 10*3/MM3 (ref 0–0.05)
IMM GRANULOCYTES NFR BLD AUTO: 0.9 % (ref 0–0.5)
INR PPP: 1 (ref 0.91–1.09)
LYMPHOCYTES # BLD AUTO: 1.22 10*3/MM3 (ref 0.7–3.1)
LYMPHOCYTES NFR BLD AUTO: 12.5 % (ref 19.6–45.3)
MCH RBC QN AUTO: 31.5 PG (ref 26.6–33)
MCHC RBC AUTO-ENTMCNC: 32.7 G/DL (ref 31.5–35.7)
MCV RBC AUTO: 96.1 FL (ref 79–97)
MONOCYTES # BLD AUTO: 0.79 10*3/MM3 (ref 0.1–0.9)
MONOCYTES NFR BLD AUTO: 8.1 % (ref 5–12)
NEUTROPHILS NFR BLD AUTO: 7.41 10*3/MM3 (ref 1.7–7)
NEUTROPHILS NFR BLD AUTO: 76.1 % (ref 42.7–76)
NRBC BLD AUTO-RTO: 0 /100 WBC (ref 0–0.2)
PLATELET # BLD AUTO: 235 10*3/MM3 (ref 140–450)
PMV BLD AUTO: 11 FL (ref 6–12)
POTASSIUM SERPL-SCNC: 4.4 MMOL/L (ref 3.5–5.2)
PROT SERPL-MCNC: 7.2 G/DL (ref 6–8.5)
PROTHROMBIN TIME: 13.3 SECONDS (ref 11.8–14.8)
RBC # BLD AUTO: 5.34 10*6/MM3 (ref 4.14–5.8)
RH BLD: POSITIVE
SODIUM SERPL-SCNC: 137 MMOL/L (ref 136–145)
T&S EXPIRATION DATE: NORMAL
WBC NRBC COR # BLD: 9.75 10*3/MM3 (ref 3.4–10.8)

## 2023-07-19 PROCEDURE — 36415 COLL VENOUS BLD VENIPUNCTURE: CPT

## 2023-07-19 PROCEDURE — 85025 COMPLETE CBC W/AUTO DIFF WBC: CPT

## 2023-07-19 PROCEDURE — 86850 RBC ANTIBODY SCREEN: CPT

## 2023-07-19 PROCEDURE — 93005 ELECTROCARDIOGRAM TRACING: CPT

## 2023-07-19 PROCEDURE — 86900 BLOOD TYPING SEROLOGIC ABO: CPT

## 2023-07-19 PROCEDURE — 93010 ELECTROCARDIOGRAM REPORT: CPT | Performed by: INTERNAL MEDICINE

## 2023-07-19 PROCEDURE — 80053 COMPREHEN METABOLIC PANEL: CPT

## 2023-07-19 PROCEDURE — 85730 THROMBOPLASTIN TIME PARTIAL: CPT

## 2023-07-19 PROCEDURE — 86901 BLOOD TYPING SEROLOGIC RH(D): CPT

## 2023-07-19 PROCEDURE — 85610 PROTHROMBIN TIME: CPT

## 2023-07-19 NOTE — DISCHARGE INSTRUCTIONS
Before you come to the hospital        Arrival time: AS DIRECTED BY OFFICE     YOU MAY TAKE THE FOLLOWING MEDICATION(S) THE MORNING OF SURGERY WITH A SIP OF WATER: GABAPENTIN, NORCO, METOPROLOL           ALL OTHER HOME MEDICATION CHECK WITH YOUR PHYSICIAN (especially if   you are taking diabetes medicines or blood thinners)    Do not take any Erectile Dysfunction medications (EX: CIALIS, VIAGRA) 24 hours prior to surgery.      If you were given and instructed to use a germ- killing soap, use as directed the night before surgery and again the morning of surgery or as directed by your surgeon. (Use one-half of the bottle with each shower.)   See attached information for How to Use Chlorhexidine for Bathing if applicable.            Eating and drinking restrictions prior to scheduled arrival time    2 Hours before arrival time STOP   Drinking Clear liquids (water, black coffee-NO CREAM,  apple juice-no pulp)      8 Hours before arrival time STOP   All food, full liquids, and dairy products    (It is extremely important that you follow these guidelines to prevent delay or cancelation of your procedure)     Clear Liquids  Water and flavored water                                                                      Clear Fruit juices, such as cranberry juice and apple juice.  Black coffee (NO cream of any kind, including powdered).  Plain tea  Clear bouillon or broth.  Flavored gelatin.  Soda.  Gatorade or Powerade.  Full liquid examples  Juices that have pulp.  Frozen ice pops that contain fruit pieces.  Coffee with creamer  Milk.  Yogurt.                MANAGING PAIN AFTER SURGERY    We know you are probably wondering what your pain will be like after surgery.  Following surgery it is unrealistic to expect you will not have pain.   Pain is how our bodies let us know that something is wrong or cautions us to be careful.  That said, our goal is to make your pain tolerable.    Methods we may use to treat your pain include  (oral or IV medications, PCAs, epidurals, nerve blocks, etc.)   While some procedures require IV pain medications for a short time after surgery, transitioning to pain medications by mouth allows for better management of pain.   Your nurse will encourage you to take oral pain medications whenever possible.  IV medications work almost immediately, but only last a short while.  Taking medications by mouth allows for a more constant level of medication in your blood stream for a longer period of time.      Once your pain is out of control it is harder to get back under control.  It is important you are aware when your next dose of pain medication is due.  If you are admitted, your nurse may write the time of your next dose on the white board in your room to help you remember.      We are interested in your pain and encourage you to inform us about aggravating factors during your visit.   Many times a simple repositioning every few hours can make a big difference.    If your physician says it is okay, do not let your pain prevent you from getting out of bed. Be sure to call your nurse for assistance prior to getting up so you do not fall.      Before surgery, please decide your tolerable pain goal.  These faces help describe the pain ratings we use on a 0-10 scale.   Be prepared to tell us your goal and whether or not you take pain or anxiety medications at home.          Preparing for Surgery  Preparing for surgery is an important part of your care. It can make things go more smoothly and help you avoid complications. The steps leading up to surgery may vary among hospitals. Follow all instructions given to you by your health care providers. Ask questions if you do not understand something. Talk about any concerns that you have.  Here are some questions to consider asking before your surgery:  If my surgery is not an emergency (is elective), when would be the best time to have the surgery?  What arrangements do I need  to make for work, home, or school?  What will my recovery be like? How long will it be before I can return to normal activities?  Will I need to prepare my home? Will I need to arrange care for me or my children?  Should I expect to have pain after surgery? What are my pain management options? Are there nonmedical options that I can try for pain?  Tell a health care provider about:  Any allergies you have.  All medicines you are taking, including vitamins, herbs, eye drops, creams, and over-the-counter medicines.  Any problems you or family members have had with anesthetic medicines.  Any blood disorders you have.  Any surgeries you have had.  Any medical conditions you have.  Whether you are pregnant or may be pregnant.  What are the risks?  The risks and complications of surgery depend on the specific procedure that you have. Discuss all the risks with your health care providers before your surgery. Ask about common surgical complications, which may include:  Infection.  Bleeding or a need for blood replacement (transfusion).  Allergic reactions to medicines.  Damage to surrounding nerves, tissues, or structures.  A blood clot.  Scarring.  Failure of the surgery to correct the problem.  Follow these instructions before the procedure:  Several days or weeks before your procedure  You may have a physical exam by your primary health care provider to make sure it is safe for you to have surgery.  You may have testing. This may include a chest X-ray, blood and urine tests, electrocardiogram (ECG), or other testing.  Ask your health care provider about:  Changing or stopping your regular medicines. This is especially important if you are taking diabetes medicines or blood thinners.  Taking medicines such as aspirin and ibuprofen. These medicines can thin your blood. Do not take these medicines unless your health care provider tells you to take them.  Taking over-the-counter medicines, vitamins, herbs, and  supplements.  Do not use any products that contain nicotine or tobacco, such as cigarettes and e-cigarettes. If you need help quitting, ask your health care provider.  Avoid alcohol.  Ask your health care provider if there are exercises you can do to prepare for surgery.  Eat a healthy diet.   Plan to have someone 18 years of age or older to take you home from the hospital. We will need to verify your ride on the morning of surgery if you are being discharged home on the same day. Tell your ride to be expecting a call from the hospital prior to your procedure.   Plan to have a responsible adult care for you for at least 24 hours after you leave the hospital or clinic. This is important.  The day before your procedure  You may be given antibiotic medicine to take by mouth to help prevent infection. Take it as told by your health care provider.  You may be asked to shower with a germ-killing soap.  Follow instructions from your health care provider about eating and drinking restrictions. This includes gum, mints and hard candy.  Pack comfortable clothes according to your procedure.   The day of your procedure  You may need to take another shower with a germ-killing soap before you leave home in the morning.  With a small sip of water, take only the medicines that you are told to take.  Remove all jewelry including rings.   Leave anything you consider valuable at home except hearing aids if needed.  You do not need to bring your home medications into the hospital.   Do not wear any makeup, nail polish, powder, deodorant, lotion, hair accessories, or anything on your skin or body except your clothes.  If you will be staying in the hospital, bring a case to hold your glasses, contacts, or dentures. You may also want to bring your robe and non-skid footwear.       (Do not use denture adhesives since you will be asked to remove them during  surgery).   If you wear oxygen at home, bring it with you the day of surgery.  If  instructed by your health care provider, bring your sleep apnea device with you on the day of your surgery (if this applies to you).  You may want to leave your suitcase and sleep apnea device in the car until after surgery.   Arrive at the hospital as scheduled.  Bring a friend or family member with you who can help to answer questions and be present while you meet with your health care provider.  At the hospital  When you arrive at the hospital:  Go to registration located at the main entrance of the hospital. You will be registered and given a beeper and a sticker sheet. Take the stickers to the Outpatient nurses desk and place in the black tray. This is to notify staff that you have arrived. Then return to the lobby to wait.   When your beeper lights up and vibrates proceed through the double doors, under the stairs, and a member of the Outpatient Surgery staff will escort you to your preoperative room.  You may have to wear compression sleeves. These help to prevent blood clots and reduce swelling in your legs.  An IV may be inserted into one of your veins.              In the operating room, you may be given one or more of the following:        A medicine to help you relax (sedative).        A medicine to numb the area (local anesthetic).        A medicine to make you fall asleep (general anesthetic).        A medicine that is injected into an area of your body to numb everything below the                      injection site (regional anesthetic).  You may be given an antibiotic through your IV to help prevent infection.  Your surgical site will be marked or identified.    Contact a health care provider if you:  Develop a fever of more than 100.4°F (38°C) or other feelings of illness during the 48 hours before your surgery.  Have symptoms that get worse.  Have questions or concerns about your surgery.  Summary  Preparing for surgery can make the procedure go more smoothly and lower your risk of  complications.  Before surgery, make a list of questions and concerns to discuss with your surgeon. Ask about the risks and possible complications.  In the days or weeks before your surgery, follow all instructions from your health care provider. You may need to stop smoking, avoid alcohol, follow eating restrictions, and change or stop your regular medicines.  Contact your surgeon if you develop a fever or other signs of illness during the few days before your surgery.  This information is not intended to replace advice given to you by your health care provider. Make sure you discuss any questions you have with your health care provider.  Document Revised: 12/21/2018 Document Reviewed: 10/23/2018  Elsevier Patient Education © 2021 [employer]

## 2023-07-20 LAB
QT INTERVAL: 432 MS
QTC INTERVAL: 434 MS

## 2023-07-21 ENCOUNTER — HOSPITAL ENCOUNTER (INPATIENT)
Facility: HOSPITAL | Age: 68
LOS: 2 days | Discharge: HOME | DRG: 165 | End: 2023-07-23
Attending: SURGERY | Admitting: SURGERY
Payer: MEDICARE

## 2023-07-21 ENCOUNTER — APPOINTMENT (OUTPATIENT)
Dept: GENERAL RADIOLOGY | Facility: HOSPITAL | Age: 68
DRG: 165 | End: 2023-07-21
Payer: MEDICARE

## 2023-07-21 DIAGNOSIS — C34.91 ADENOCARCINOMA, LUNG, RIGHT: ICD-10-CM

## 2023-07-21 DIAGNOSIS — C34.11 MALIGNANT NEOPLASM OF UPPER LOBE OF RIGHT LUNG: Primary | ICD-10-CM

## 2023-07-21 PROBLEM — Z72.0 TOBACCO USE: Status: ACTIVE | Noted: 2023-07-21

## 2023-07-21 PROBLEM — C34.90 LUNG CANCER: Status: ACTIVE | Noted: 2023-07-21

## 2023-07-21 LAB
ANION GAP SERPL CALCULATED.3IONS-SCNC: 14 MMOL/L (ref 5–15)
BUN SERPL-MCNC: 11 MG/DL (ref 8–23)
BUN/CREAT SERPL: 10 (ref 7–25)
CALCIUM SPEC-SCNC: 8.7 MG/DL (ref 8.6–10.5)
CHLORIDE SERPL-SCNC: 102 MMOL/L (ref 98–107)
CO2 SERPL-SCNC: 21 MMOL/L (ref 22–29)
CREAT SERPL-MCNC: 1.1 MG/DL (ref 0.76–1.27)
DEPRECATED RDW RBC AUTO: 46.5 FL (ref 37–54)
EGFRCR SERPLBLD CKD-EPI 2021: 73.6 ML/MIN/1.73
ERYTHROCYTE [DISTWIDTH] IN BLOOD BY AUTOMATED COUNT: 13 % (ref 12.3–15.4)
GLUCOSE BLDC GLUCOMTR-MCNC: 132 MG/DL (ref 70–130)
GLUCOSE BLDC GLUCOMTR-MCNC: 192 MG/DL (ref 70–130)
GLUCOSE SERPL-MCNC: 179 MG/DL (ref 65–99)
HCT VFR BLD AUTO: 47.9 % (ref 37.5–51)
HGB BLD-MCNC: 15.6 G/DL (ref 13–17.7)
MCH RBC QN AUTO: 31.4 PG (ref 26.6–33)
MCHC RBC AUTO-ENTMCNC: 32.6 G/DL (ref 31.5–35.7)
MCV RBC AUTO: 96.4 FL (ref 79–97)
PLATELET # BLD AUTO: 209 10*3/MM3 (ref 140–450)
PMV BLD AUTO: 10.2 FL (ref 6–12)
POTASSIUM SERPL-SCNC: 4.9 MMOL/L (ref 3.5–5.2)
RBC # BLD AUTO: 4.97 10*6/MM3 (ref 4.14–5.8)
SODIUM SERPL-SCNC: 137 MMOL/L (ref 136–145)
WBC NRBC COR # BLD: 10.39 10*3/MM3 (ref 3.4–10.8)

## 2023-07-21 PROCEDURE — 07T74ZZ RESECTION OF THORAX LYMPHATIC, PERCUTANEOUS ENDOSCOPIC APPROACH: ICD-10-PCS | Performed by: SURGERY

## 2023-07-21 PROCEDURE — 80048 BASIC METABOLIC PNL TOTAL CA: CPT | Performed by: SURGERY

## 2023-07-21 PROCEDURE — 82948 REAGENT STRIP/BLOOD GLUCOSE: CPT

## 2023-07-21 PROCEDURE — 32674 THORACOSCOPY LYMPH NODE EXC: CPT | Performed by: SURGERY

## 2023-07-21 PROCEDURE — 85027 COMPLETE CBC AUTOMATED: CPT | Performed by: SURGERY

## 2023-07-21 PROCEDURE — 94799 UNLISTED PULMONARY SVC/PX: CPT

## 2023-07-21 PROCEDURE — 94640 AIRWAY INHALATION TREATMENT: CPT

## 2023-07-21 PROCEDURE — 88305 TISSUE EXAM BY PATHOLOGIST: CPT | Performed by: SURGERY

## 2023-07-21 PROCEDURE — C1729 CATH, DRAINAGE: HCPCS | Performed by: SURGERY

## 2023-07-21 PROCEDURE — 25010000002 HEPARIN (PORCINE) PER 1000 UNITS: Performed by: NURSE PRACTITIONER

## 2023-07-21 PROCEDURE — 88341 IMHCHEM/IMCYTCHM EA ADD ANTB: CPT | Performed by: SURGERY

## 2023-07-21 PROCEDURE — 94760 N-INVAS EAR/PLS OXIMETRY 1: CPT

## 2023-07-21 PROCEDURE — 88331 PATH CONSLTJ SURG 1 BLK 1SPC: CPT | Performed by: PATHOLOGY

## 2023-07-21 PROCEDURE — 32608 THORACOSCOPY W/BX NODULE: CPT | Performed by: SURGERY

## 2023-07-21 PROCEDURE — 71045 X-RAY EXAM CHEST 1 VIEW: CPT

## 2023-07-21 PROCEDURE — 25010000002 CEFAZOLIN PER 500 MG: Performed by: SURGERY

## 2023-07-21 PROCEDURE — 8E0W4CZ ROBOTIC ASSISTED PROCEDURE OF TRUNK REGION, PERCUTANEOUS ENDOSCOPIC APPROACH: ICD-10-PCS | Performed by: SURGERY

## 2023-07-21 PROCEDURE — C9290 INJ, BUPIVACAINE LIPOSOME: HCPCS | Performed by: SURGERY

## 2023-07-21 PROCEDURE — 94761 N-INVAS EAR/PLS OXIMETRY MLT: CPT

## 2023-07-21 PROCEDURE — 0 BUPIVACAINE LIPOSOME 1.3 % SUSPENSION 20 ML VIAL: Performed by: SURGERY

## 2023-07-21 PROCEDURE — 88307 TISSUE EXAM BY PATHOLOGIST: CPT | Performed by: SURGERY

## 2023-07-21 PROCEDURE — C2615 SEALANT, PULMONARY, LIQUID: HCPCS | Performed by: SURGERY

## 2023-07-21 PROCEDURE — 88342 IMHCHEM/IMCYTCHM 1ST ANTB: CPT | Performed by: SURGERY

## 2023-07-21 PROCEDURE — 0BBC4ZZ EXCISION OF RIGHT UPPER LUNG LOBE, PERCUTANEOUS ENDOSCOPIC APPROACH: ICD-10-PCS | Performed by: SURGERY

## 2023-07-21 PROCEDURE — 25010000002 DROPERIDOL PER 5 MG: Performed by: ANESTHESIOLOGY

## 2023-07-21 PROCEDURE — 25010000002 HYDROMORPHONE PER 4 MG: Performed by: ANESTHESIOLOGY

## 2023-07-21 DEVICE — SEALANT PLURAL AIRLEAK PROGEL W/APPL 4ML: Type: IMPLANTABLE DEVICE | Site: PLEURAL SPACE | Status: FUNCTIONAL

## 2023-07-21 DEVICE — ABSORBABLE HEMOSTAT (OXIDIZED REGENERATED CELLULOSE, U.S.P.)
Type: IMPLANTABLE DEVICE | Site: PLEURAL SPACE | Status: FUNCTIONAL
Brand: SURGICEL

## 2023-07-21 DEVICE — SUREFORM 45 RELOAD BLUE
Type: IMPLANTABLE DEVICE | Site: PLEURAL SPACE | Status: FUNCTIONAL
Brand: SUREFORM

## 2023-07-21 DEVICE — SUREFORM 45 RELOAD BLACK
Type: IMPLANTABLE DEVICE | Site: PLEURAL SPACE | Status: FUNCTIONAL
Brand: SUREFORM

## 2023-07-21 RX ORDER — DROPERIDOL 2.5 MG/ML
0.62 INJECTION, SOLUTION INTRAMUSCULAR; INTRAVENOUS ONCE AS NEEDED
Status: COMPLETED | OUTPATIENT
Start: 2023-07-21 | End: 2023-07-21

## 2023-07-21 RX ORDER — OXYCODONE HYDROCHLORIDE AND ACETAMINOPHEN 5; 325 MG/1; MG/1
1 TABLET ORAL
Status: DISCONTINUED | OUTPATIENT
Start: 2023-07-21 | End: 2023-07-23 | Stop reason: HOSPADM

## 2023-07-21 RX ORDER — MORPHINE SULFATE 2 MG/ML
2 INJECTION, SOLUTION INTRAMUSCULAR; INTRAVENOUS EVERY 4 HOURS PRN
Status: DISCONTINUED | OUTPATIENT
Start: 2023-07-21 | End: 2023-07-23 | Stop reason: HOSPADM

## 2023-07-21 RX ORDER — FENTANYL CITRATE 50 UG/ML
25 INJECTION, SOLUTION INTRAMUSCULAR; INTRAVENOUS
Status: DISCONTINUED | OUTPATIENT
Start: 2023-07-21 | End: 2023-07-21 | Stop reason: HOSPADM

## 2023-07-21 RX ORDER — POLYETHYLENE GLYCOL 3350 17 G/17G
17 POWDER, FOR SOLUTION ORAL DAILY
Status: DISCONTINUED | OUTPATIENT
Start: 2023-07-21 | End: 2023-07-23 | Stop reason: HOSPADM

## 2023-07-21 RX ORDER — ACETAMINOPHEN 325 MG/1
650 TABLET ORAL EVERY 6 HOURS
Status: DISCONTINUED | OUTPATIENT
Start: 2023-07-21 | End: 2023-07-23 | Stop reason: HOSPADM

## 2023-07-21 RX ORDER — ONDANSETRON 2 MG/ML
4 INJECTION INTRAMUSCULAR; INTRAVENOUS
Status: DISCONTINUED | OUTPATIENT
Start: 2023-07-21 | End: 2023-07-21 | Stop reason: HOSPADM

## 2023-07-21 RX ORDER — SODIUM CHLORIDE, SODIUM LACTATE, POTASSIUM CHLORIDE, CALCIUM CHLORIDE 600; 310; 30; 20 MG/100ML; MG/100ML; MG/100ML; MG/100ML
100 INJECTION, SOLUTION INTRAVENOUS CONTINUOUS
Status: DISCONTINUED | OUTPATIENT
Start: 2023-07-21 | End: 2023-07-21

## 2023-07-21 RX ORDER — MIDAZOLAM HYDROCHLORIDE 1 MG/ML
0.5 INJECTION INTRAMUSCULAR; INTRAVENOUS
Status: DISCONTINUED | OUTPATIENT
Start: 2023-07-21 | End: 2023-07-21 | Stop reason: HOSPADM

## 2023-07-21 RX ORDER — SODIUM CHLORIDE 0.9 % (FLUSH) 0.9 %
10 SYRINGE (ML) INJECTION AS NEEDED
Status: DISCONTINUED | OUTPATIENT
Start: 2023-07-21 | End: 2023-07-21 | Stop reason: HOSPADM

## 2023-07-21 RX ORDER — SODIUM CHLORIDE 9 MG/ML
40 INJECTION, SOLUTION INTRAVENOUS AS NEEDED
Status: DISCONTINUED | OUTPATIENT
Start: 2023-07-21 | End: 2023-07-21 | Stop reason: HOSPADM

## 2023-07-21 RX ORDER — LIDOCAINE HYDROCHLORIDE 10 MG/ML
0.5 INJECTION, SOLUTION EPIDURAL; INFILTRATION; INTRACAUDAL; PERINEURAL ONCE AS NEEDED
Status: DISCONTINUED | OUTPATIENT
Start: 2023-07-21 | End: 2023-07-21 | Stop reason: HOSPADM

## 2023-07-21 RX ORDER — IBUPROFEN 600 MG/1
600 TABLET ORAL ONCE AS NEEDED
Status: DISCONTINUED | OUTPATIENT
Start: 2023-07-21 | End: 2023-07-21 | Stop reason: HOSPADM

## 2023-07-21 RX ORDER — ONDANSETRON 2 MG/ML
4 INJECTION INTRAMUSCULAR; INTRAVENOUS EVERY 6 HOURS PRN
Status: DISCONTINUED | OUTPATIENT
Start: 2023-07-21 | End: 2023-07-23 | Stop reason: HOSPADM

## 2023-07-21 RX ORDER — IPRATROPIUM BROMIDE AND ALBUTEROL SULFATE 2.5; .5 MG/3ML; MG/3ML
3 SOLUTION RESPIRATORY (INHALATION)
Status: DISPENSED | OUTPATIENT
Start: 2023-07-21 | End: 2023-07-23

## 2023-07-21 RX ORDER — MAGNESIUM HYDROXIDE 1200 MG/15ML
LIQUID ORAL AS NEEDED
Status: DISCONTINUED | OUTPATIENT
Start: 2023-07-21 | End: 2023-07-21 | Stop reason: HOSPADM

## 2023-07-21 RX ORDER — OXYCODONE AND ACETAMINOPHEN 10; 325 MG/1; MG/1
1 TABLET ORAL ONCE AS NEEDED
Status: DISCONTINUED | OUTPATIENT
Start: 2023-07-21 | End: 2023-07-21 | Stop reason: HOSPADM

## 2023-07-21 RX ORDER — SODIUM CHLORIDE, SODIUM LACTATE, POTASSIUM CHLORIDE, CALCIUM CHLORIDE 600; 310; 30; 20 MG/100ML; MG/100ML; MG/100ML; MG/100ML
1000 INJECTION, SOLUTION INTRAVENOUS CONTINUOUS
Status: DISCONTINUED | OUTPATIENT
Start: 2023-07-21 | End: 2023-07-21 | Stop reason: HOSPADM

## 2023-07-21 RX ORDER — ENOXAPARIN SODIUM 100 MG/ML
40 INJECTION SUBCUTANEOUS DAILY
Status: DISCONTINUED | OUTPATIENT
Start: 2023-07-22 | End: 2023-07-23 | Stop reason: HOSPADM

## 2023-07-21 RX ORDER — SODIUM CHLORIDE 0.9 % (FLUSH) 0.9 %
3 SYRINGE (ML) INJECTION EVERY 12 HOURS SCHEDULED
Status: DISCONTINUED | OUTPATIENT
Start: 2023-07-21 | End: 2023-07-21 | Stop reason: HOSPADM

## 2023-07-21 RX ORDER — FLUMAZENIL 0.1 MG/ML
0.2 INJECTION INTRAVENOUS AS NEEDED
Status: DISCONTINUED | OUTPATIENT
Start: 2023-07-21 | End: 2023-07-21 | Stop reason: HOSPADM

## 2023-07-21 RX ORDER — AMLODIPINE BESYLATE 5 MG/1
5 TABLET ORAL DAILY
Status: DISCONTINUED | OUTPATIENT
Start: 2023-07-21 | End: 2023-07-23 | Stop reason: HOSPADM

## 2023-07-21 RX ORDER — SODIUM CHLORIDE 0.9 % (FLUSH) 0.9 %
3-10 SYRINGE (ML) INJECTION AS NEEDED
Status: DISCONTINUED | OUTPATIENT
Start: 2023-07-21 | End: 2023-07-21 | Stop reason: HOSPADM

## 2023-07-21 RX ORDER — NALOXONE HCL 0.4 MG/ML
0.04 VIAL (ML) INJECTION AS NEEDED
Status: DISCONTINUED | OUTPATIENT
Start: 2023-07-21 | End: 2023-07-21 | Stop reason: HOSPADM

## 2023-07-21 RX ORDER — LABETALOL HYDROCHLORIDE 5 MG/ML
5 INJECTION, SOLUTION INTRAVENOUS
Status: DISCONTINUED | OUTPATIENT
Start: 2023-07-21 | End: 2023-07-21 | Stop reason: HOSPADM

## 2023-07-21 RX ORDER — NALOXONE HCL 0.4 MG/ML
0.4 VIAL (ML) INJECTION
Status: DISCONTINUED | OUTPATIENT
Start: 2023-07-21 | End: 2023-07-23 | Stop reason: HOSPADM

## 2023-07-21 RX ORDER — HEPARIN SODIUM 5000 [USP'U]/ML
5000 INJECTION, SOLUTION INTRAVENOUS; SUBCUTANEOUS ONCE
Status: COMPLETED | OUTPATIENT
Start: 2023-07-21 | End: 2023-07-21

## 2023-07-21 RX ORDER — IBUPROFEN 200 MG
200 TABLET ORAL EVERY 6 HOURS PRN
Status: ON HOLD | COMMUNITY
End: 2023-07-21 | Stop reason: DRUGHIGH

## 2023-07-21 RX ORDER — BISACODYL 10 MG
10 SUPPOSITORY, RECTAL RECTAL DAILY PRN
Status: DISCONTINUED | OUTPATIENT
Start: 2023-07-21 | End: 2023-07-23 | Stop reason: HOSPADM

## 2023-07-21 RX ORDER — ATORVASTATIN CALCIUM 10 MG/1
10 TABLET, FILM COATED ORAL NIGHTLY
Status: DISCONTINUED | OUTPATIENT
Start: 2023-07-21 | End: 2023-07-23 | Stop reason: HOSPADM

## 2023-07-21 RX ORDER — ONDANSETRON 4 MG/1
4 TABLET, FILM COATED ORAL EVERY 6 HOURS PRN
Status: DISCONTINUED | OUTPATIENT
Start: 2023-07-21 | End: 2023-07-23 | Stop reason: HOSPADM

## 2023-07-21 RX ORDER — SODIUM CHLORIDE 0.9 % (FLUSH) 0.9 %
10 SYRINGE (ML) INJECTION EVERY 12 HOURS SCHEDULED
Status: DISCONTINUED | OUTPATIENT
Start: 2023-07-21 | End: 2023-07-21 | Stop reason: HOSPADM

## 2023-07-21 RX ORDER — GABAPENTIN 300 MG/1
300 CAPSULE ORAL EVERY 8 HOURS SCHEDULED
Status: DISCONTINUED | OUTPATIENT
Start: 2023-07-21 | End: 2023-07-23 | Stop reason: HOSPADM

## 2023-07-21 RX ORDER — ASPIRIN 81 MG/1
81 TABLET ORAL DAILY
Status: DISCONTINUED | OUTPATIENT
Start: 2023-07-22 | End: 2023-07-23 | Stop reason: HOSPADM

## 2023-07-21 RX ORDER — METOPROLOL TARTRATE 50 MG/1
50 TABLET, FILM COATED ORAL 2 TIMES DAILY
Status: DISCONTINUED | OUTPATIENT
Start: 2023-07-21 | End: 2023-07-23 | Stop reason: HOSPADM

## 2023-07-21 RX ORDER — HYDROMORPHONE HYDROCHLORIDE 1 MG/ML
0.5 INJECTION, SOLUTION INTRAMUSCULAR; INTRAVENOUS; SUBCUTANEOUS
Status: DISCONTINUED | OUTPATIENT
Start: 2023-07-21 | End: 2023-07-21 | Stop reason: HOSPADM

## 2023-07-21 RX ORDER — SODIUM CHLORIDE 0.9 % (FLUSH) 0.9 %
3 SYRINGE (ML) INJECTION AS NEEDED
Status: DISCONTINUED | OUTPATIENT
Start: 2023-07-21 | End: 2023-07-21 | Stop reason: HOSPADM

## 2023-07-21 RX ORDER — ACETAMINOPHEN 500 MG
1000 TABLET ORAL ONCE
Status: COMPLETED | OUTPATIENT
Start: 2023-07-21 | End: 2023-07-21

## 2023-07-21 RX ORDER — NITROGLYCERIN 0.4 MG/1
0.4 TABLET SUBLINGUAL
Status: DISCONTINUED | OUTPATIENT
Start: 2023-07-21 | End: 2023-07-23 | Stop reason: HOSPADM

## 2023-07-21 RX ORDER — ACETYLCYSTEINE 200 MG/ML
3 SOLUTION ORAL; RESPIRATORY (INHALATION)
Status: COMPLETED | OUTPATIENT
Start: 2023-07-21 | End: 2023-07-23

## 2023-07-21 RX ORDER — IBUPROFEN 600 MG/1
600 TABLET ORAL EVERY 6 HOURS SCHEDULED
Status: DISCONTINUED | OUTPATIENT
Start: 2023-07-21 | End: 2023-07-23 | Stop reason: HOSPADM

## 2023-07-21 RX ORDER — SODIUM CHLORIDE, SODIUM LACTATE, POTASSIUM CHLORIDE, CALCIUM CHLORIDE 600; 310; 30; 20 MG/100ML; MG/100ML; MG/100ML; MG/100ML
40 INJECTION, SOLUTION INTRAVENOUS CONTINUOUS
Status: DISCONTINUED | OUTPATIENT
Start: 2023-07-21 | End: 2023-07-22

## 2023-07-21 RX ORDER — DOCUSATE SODIUM 100 MG/1
100 CAPSULE, LIQUID FILLED ORAL DAILY
Status: DISCONTINUED | OUTPATIENT
Start: 2023-07-21 | End: 2023-07-23 | Stop reason: HOSPADM

## 2023-07-21 RX ORDER — CITALOPRAM 20 MG/1
20 TABLET ORAL DAILY
Status: DISCONTINUED | OUTPATIENT
Start: 2023-07-21 | End: 2023-07-23 | Stop reason: HOSPADM

## 2023-07-21 RX ORDER — PANTOPRAZOLE SODIUM 20 MG/1
20 TABLET, DELAYED RELEASE ORAL
Status: DISCONTINUED | OUTPATIENT
Start: 2023-07-22 | End: 2023-07-23 | Stop reason: HOSPADM

## 2023-07-21 RX ADMIN — GABAPENTIN 300 MG: 300 CAPSULE ORAL at 21:29

## 2023-07-21 RX ADMIN — ATORVASTATIN CALCIUM 10 MG: 10 TABLET, FILM COATED ORAL at 21:29

## 2023-07-21 RX ADMIN — ACETAMINOPHEN 650 MG: 325 TABLET ORAL at 13:49

## 2023-07-21 RX ADMIN — AMLODIPINE BESYLATE 5 MG: 5 TABLET ORAL at 13:40

## 2023-07-21 RX ADMIN — SODIUM CHLORIDE, POTASSIUM CHLORIDE, SODIUM LACTATE AND CALCIUM CHLORIDE 1000 ML: 600; 310; 30; 20 INJECTION, SOLUTION INTRAVENOUS at 05:50

## 2023-07-21 RX ADMIN — HYDROMORPHONE HYDROCHLORIDE 0.5 MG: 1 INJECTION, SOLUTION INTRAMUSCULAR; INTRAVENOUS; SUBCUTANEOUS at 11:47

## 2023-07-21 RX ADMIN — SODIUM CHLORIDE, POTASSIUM CHLORIDE, SODIUM LACTATE AND CALCIUM CHLORIDE 40 ML/HR: 600; 310; 30; 20 INJECTION, SOLUTION INTRAVENOUS at 13:41

## 2023-07-21 RX ADMIN — METOPROLOL TARTRATE 50 MG: 50 TABLET, FILM COATED ORAL at 13:40

## 2023-07-21 RX ADMIN — POLYETHYLENE GLYCOL 3350 17 G: 17 POWDER, FOR SOLUTION ORAL at 13:49

## 2023-07-21 RX ADMIN — SODIUM CHLORIDE, POTASSIUM CHLORIDE, SODIUM LACTATE AND CALCIUM CHLORIDE 1000 ML: 600; 310; 30; 20 INJECTION, SOLUTION INTRAVENOUS at 05:53

## 2023-07-21 RX ADMIN — HYDROMORPHONE HYDROCHLORIDE 0.5 MG: 1 INJECTION, SOLUTION INTRAMUSCULAR; INTRAVENOUS; SUBCUTANEOUS at 11:37

## 2023-07-21 RX ADMIN — IPRATROPIUM BROMIDE AND ALBUTEROL SULFATE 3 ML: .5; 3 SOLUTION RESPIRATORY (INHALATION) at 14:33

## 2023-07-21 RX ADMIN — ACETAMINOPHEN 650 MG: 325 TABLET ORAL at 21:29

## 2023-07-21 RX ADMIN — ACETAMINOPHEN 1000 MG: 500 TABLET, FILM COATED ORAL at 06:35

## 2023-07-21 RX ADMIN — CEFAZOLIN 2 G: 2 INJECTION, POWDER, FOR SOLUTION INTRAMUSCULAR; INTRAVENOUS at 21:29

## 2023-07-21 RX ADMIN — ACETYLCYSTEINE 3 ML: 200 SOLUTION ORAL; RESPIRATORY (INHALATION) at 14:34

## 2023-07-21 RX ADMIN — CITALOPRAM 20 MG: 20 TABLET, FILM COATED ORAL at 13:40

## 2023-07-21 RX ADMIN — ACETYLCYSTEINE 3 ML: 200 SOLUTION ORAL; RESPIRATORY (INHALATION) at 23:35

## 2023-07-21 RX ADMIN — HYDROMORPHONE HYDROCHLORIDE 0.5 MG: 1 INJECTION, SOLUTION INTRAMUSCULAR; INTRAVENOUS; SUBCUTANEOUS at 11:27

## 2023-07-21 RX ADMIN — IBUPROFEN 600 MG: 600 TABLET, FILM COATED ORAL at 13:39

## 2023-07-21 RX ADMIN — IPRATROPIUM BROMIDE AND ALBUTEROL SULFATE 3 ML: .5; 3 SOLUTION RESPIRATORY (INHALATION) at 23:35

## 2023-07-21 RX ADMIN — HEPARIN SODIUM 5000 UNITS: 5000 INJECTION INTRAVENOUS; SUBCUTANEOUS at 06:35

## 2023-07-21 RX ADMIN — DROPERIDOL 0.62 MG: 2.5 INJECTION, SOLUTION INTRAMUSCULAR; INTRAVENOUS at 11:30

## 2023-07-21 RX ADMIN — DOCUSATE SODIUM 100 MG: 100 CAPSULE, LIQUID FILLED ORAL at 13:40

## 2023-07-21 RX ADMIN — GABAPENTIN 300 MG: 300 CAPSULE ORAL at 13:49

## 2023-07-21 NOTE — INTERVAL H&P NOTE
Since H&P have reviewed PFT's, DLCO is not adequate to tolerate anatomic resection.  Will proceed with robotic RUL wedge resection, MLND; discussed risks and benefits and he understands and agrees to proceed.    Shaggy Philippe M.D.  Cardiothoracic Surgeon

## 2023-07-21 NOTE — OP NOTE
Cardiothoracic Surgery Operative Note     Date of Procedure: 7/21/2023    Pre-op diagnosis: Lung Cancer, Right Upper Lobe  Obese, BMI 30  Current Active Smoker  Hypertension  Hyperlipidemia  GERD  Type 2 Diabetes Mellitus  HYUN  COPD    Post-op diagnosis: Same    Procedure:  Right Robotic Assisted video-assisted thoracoscopic (VATS) Theraputic Wedge Resection  Right Robot Assisted video-assisted thoracoscopic (VATS) Lymph Node Dissection     Surgeon: Shaggy Philippe M.D.  Assistant: Mendel Encarnacion CFA  Anesthesia: GETA- double lumen  EBL: 50 mL  Drains: 24 Peruvian, Straight Right Chest Tube     This resection was performed for curative intent.    Specimens:  Right Upper Lobe Wedge Resection (frozen section consistent with NSCLC, margin negative with adequate margin)  Level 7 Lymph Node  Level 9 Lymph Node  Level 4R Lymph Node  Level 10 Sump Node    Operative indications:    Mr. Verma is a 68-year-old male with history of obesity, chronic current smoking, COPD.  He incidentally was noted to have a right upper lobe lung nodule.  He was followed by Dr. Rj Fritz and then underwent bronchoscopic biopsy that was consistent with adenocarcinoma.  He was referred to me for surgical reduction.  We performed PFTs and he did not have adequate lung reserve to tolerate anatomic lobectomy.  Given this I discussed with him proceeding with wedge resection with lymph node dissection, he understood the risk and benefits and agreed to proceed.    Operative findings     Intrathoracic Findings: Anthrascitic lungs, he had very friable lungs  Mass identified in right upper lobe with visceropleural puckering.  Wedge resection performed with adequate parenchymal margin     Operative description in detail:  The patient was taken to the operative suite where he was placed in a supine position. General anesthesia was induced without complication and a double lumen ET tube was placed by anesthesia. A timeout was performed. With that bronchoscopy was  performed demonstrating no endobronchial lesion, normal appearing right bronchi, and standard bronchial anatomy.     He was positioned in lateral decubitus position with right side up. All pressure points are protected. Single lung ventilation was initiated and double lumen endotracheal tube position confirmed.  A was then prepped and draped in the usual and sterile fashion.       An incision was made sharply in line with the middle axillary line at approximately the 8th intercostal space.  The pleural cavity was accessed and surveyed.  Additional robotic ports were placed in the 8th and 9th interspace.    Assistant port was placed laterally along the diaphragmatic margin under direct vision.  We then identified the right upper lobe mass and performed wedge resection with serial blue load staples.  Wedge resection was removed from the thorax with a protective bag and on examination did appear to contain nodule with adequate margin.      We then incised the pleura over level 4R lymph nodes and biopsied these lymph nodes.  The lung was retracted anteriorly and level 7 lymph nodes were identified and biopsied as well.  The inferior pulmonary ligament was divided up to the level of the inferior pulmonary vein, level 9 lymph node was taken.  I retracted the lung anteriorly and opened up the secondary carinal space and biopsied level 10 sump node.    Hemostasis was ensured in the operative site and Progel was applied over staple lines.  A 24 Haitian straight chest tube was placed through the third port site after tunneling.  I performed intercostal blocks using a one-to-one Marcaine/Exparel mixture.  All ports were removed under direct vision ensuring adequate hemostasis.  The lung was allowed to expand under direct vision and both lower and upper lobes expanded well and filled the thorax.  All port sites and assistant site were closed in anatomical layers using absorbable suture.  Dressings were placed.  The chest tube  was connected to 20 cm of suction.    The patient tolerated the procedure well was extubated in the operating room and transferred the PACU in stable condition.  All needle sponge and instrument counts were correct at the end of the case.     Complications: None  Disposition: Transfer to PACU extubated and in stable condition.      Shaggy Philippe MD  Cardiothoracic Surgeon

## 2023-07-21 NOTE — DISCHARGE SUMMARY
[facility] Cardiothoracic Surgery  DISCHARGE SUMMARY        Date of Admission: 7/21/2023  Date of Discharge:  7/23/2023  Primary Care Physician: Carol Ann Sapp APRN    Discharge Diagnoses:  Active Hospital Problems    Diagnosis     **Adenocarcinoma, lung, right     Lung cancer     Tobacco use        Procedures Performed:   Right Robotic Assisted video-assisted thoracoscopic (VATS) Theraputic Wedge Resection, Right Robot Assisted video-assisted thoracoscopic (VATS) Lymph Node Dissection by Dr. Philippe on 7/21/2022    HPI:  Mr. Rupa Verma is a 68 y.o. male who presented to Dr. Philippe as an outpatient with incidentally noted right upper lobe lung nodule.  He underwent bronchoscopic biopsy by Dr. Fritz which was consistent with adenocarcinoma.  He was referred to Dr. Philippe for surgical resection.  Pulmonary function test were performed and he did not have adequate lung reserve to tolerate anatomic lobectomy.  Dr. Philippe discussed with him the risk and benefits of proceeding with wedge resection with mediastinal lymph node dissection and he understood the risk and agreed to proceed.    Hospital Course: On 7/21/2022, Mr. Verma was taken to the operating room for right robotic assisted VATS wedge resection and mediastinal lymph node dissection.  See separate op note by Dr. Philippe detailing the operation.  Following surgery he was transferred to the PACU in stable condition and ultimately transferred to  for ongoing recovery.  The remainder of his hospital stay was significant for encouraging pulmonary toilet and ambulation, pain control, and weaning supplemental O2.  He is eating well.  He is ambulating without difficulty.  The right chest tube was removed without remark on postop day 2.  Follow up imaging revealed stable right apical space.  He meets criteria for discharge home on postop day 2 and the patient is agreeable this plan.    Routine follow-up with me in 1 week with chest x-ray  prior to appointment.    Condition on Discharge:  Neurologically intact and has good pain control.  He is eating well and has demonstrable good bowel function.  All thoracic incisions are healing nicely without evidence of thoracic hernia.  The heart is in normal sinus rhythm.         Discharge Disposition:  Home or Self Care [1]    Discharge Medications:     Discharge Medications        New Medications        Instructions Start Date   traMADol 50 MG tablet  Commonly known as: ULTRAM   50 mg, Oral, Every 6 Hours PRN             Continue These Medications        Instructions Start Date   amLODIPine 5 MG tablet  Commonly known as: NORVASC   5 mg, Oral, Daily      aspirin 81 MG tablet   Take 1 tablet by mouth Daily.      citalopram 20 MG tablet  Commonly known as: CeleXA   20 mg, Oral, Daily      gabapentin 300 MG capsule  Commonly known as: NEURONTIN   300 mg, Oral, 3 Times Daily      HYDROcodone-acetaminophen 7.5-325 MG per tablet  Commonly known as: NORCO   1 tablet, Oral, Every 12 Hours PRN      ibuprofen 100 MG/5ML suspension  Commonly known as: ADVIL,MOTRIN   200 mg, Oral, Every 6 Hours PRN      metFORMIN 500 MG tablet  Commonly known as: GLUCOPHAGE   500 mg, Oral, Daily With Breakfast      metoprolol tartrate 50 MG tablet  Commonly known as: LOPRESSOR   50 mg, Oral, 2 Times Daily      naloxone 4 MG/0.1ML nasal spray  Commonly known as: NARCAN   1 spray, Nasal, As Needed, For Hydrocodone prescription      pantoprazole 20 MG EC tablet  Commonly known as: PROTONIX   20 mg, Oral, Daily      simvastatin 40 MG tablet  Commonly known as: ZOCOR   40 mg, Oral, Daily               Discharge Diet: No concentrated sweets diet with an effort to maintain acceptable glycemic control.      Discharge Care Plan / Instructions:   Keep incisions clean and open to air.  May wash with soap and water.  Chest tube sites with dressings to keep on for 48 hours.  Ok to reapply dressing as needed after removal.      Activity at Discharge:    No heavy lifting greater than 5 pounds or a gallon of milk and refrain from driving until cleared.      Tobacco: Patient has been counseled as to smoking cessation. We discussed its benefits in regards to immediate recovery and the long-term benefits of avoidance of tobacco products.  We discussed the benefit of long-term health that tobacco cessation would convey.      Follow-up Appointments: Rupa Verma  is requested to see Carol Ann Sapp APRN within 1-2 weeks from time of discharge, to follow-up with ARIAN Villanueva in 1 week with chest x-ray prior to appointment.

## 2023-07-22 ENCOUNTER — APPOINTMENT (OUTPATIENT)
Dept: GENERAL RADIOLOGY | Facility: HOSPITAL | Age: 68
DRG: 165 | End: 2023-07-22
Payer: MEDICARE

## 2023-07-22 LAB
ANION GAP SERPL CALCULATED.3IONS-SCNC: 17 MMOL/L (ref 5–15)
BUN SERPL-MCNC: 20 MG/DL (ref 8–23)
BUN/CREAT SERPL: 14.2 (ref 7–25)
CALCIUM SPEC-SCNC: 8.3 MG/DL (ref 8.6–10.5)
CHLORIDE SERPL-SCNC: 100 MMOL/L (ref 98–107)
CO2 SERPL-SCNC: 20 MMOL/L (ref 22–29)
CREAT SERPL-MCNC: 1.41 MG/DL (ref 0.76–1.27)
DEPRECATED RDW RBC AUTO: 48.8 FL (ref 37–54)
EGFRCR SERPLBLD CKD-EPI 2021: 54.6 ML/MIN/1.73
ERYTHROCYTE [DISTWIDTH] IN BLOOD BY AUTOMATED COUNT: 13.1 % (ref 12.3–15.4)
GLUCOSE SERPL-MCNC: 161 MG/DL (ref 65–99)
HCT VFR BLD AUTO: 44.8 % (ref 37.5–51)
HGB BLD-MCNC: 13.7 G/DL (ref 13–17.7)
MCH RBC QN AUTO: 30.7 PG (ref 26.6–33)
MCHC RBC AUTO-ENTMCNC: 30.6 G/DL (ref 31.5–35.7)
MCV RBC AUTO: 100.4 FL (ref 79–97)
PLATELET # BLD AUTO: 188 10*3/MM3 (ref 140–450)
PMV BLD AUTO: 10.6 FL (ref 6–12)
POTASSIUM SERPL-SCNC: 4.7 MMOL/L (ref 3.5–5.2)
RBC # BLD AUTO: 4.46 10*6/MM3 (ref 4.14–5.8)
SODIUM SERPL-SCNC: 137 MMOL/L (ref 136–145)
WBC NRBC COR # BLD: 14.47 10*3/MM3 (ref 3.4–10.8)

## 2023-07-22 PROCEDURE — 71045 X-RAY EXAM CHEST 1 VIEW: CPT

## 2023-07-22 PROCEDURE — 80048 BASIC METABOLIC PNL TOTAL CA: CPT | Performed by: SURGERY

## 2023-07-22 PROCEDURE — 94799 UNLISTED PULMONARY SVC/PX: CPT

## 2023-07-22 PROCEDURE — 25010000002 ENOXAPARIN PER 10 MG: Performed by: SURGERY

## 2023-07-22 PROCEDURE — 85027 COMPLETE CBC AUTOMATED: CPT | Performed by: SURGERY

## 2023-07-22 PROCEDURE — 25010000002 CEFAZOLIN PER 500 MG: Performed by: SURGERY

## 2023-07-22 PROCEDURE — 94664 DEMO&/EVAL PT USE INHALER: CPT

## 2023-07-22 PROCEDURE — 94760 N-INVAS EAR/PLS OXIMETRY 1: CPT

## 2023-07-22 RX ORDER — ALBUTEROL SULFATE 1.25 MG/3ML
1.25 SOLUTION RESPIRATORY (INHALATION)
Status: DISCONTINUED | OUTPATIENT
Start: 2023-07-22 | End: 2023-07-23 | Stop reason: HOSPADM

## 2023-07-22 RX ADMIN — IPRATROPIUM BROMIDE AND ALBUTEROL SULFATE 3 ML: .5; 3 SOLUTION RESPIRATORY (INHALATION) at 23:50

## 2023-07-22 RX ADMIN — METOPROLOL TARTRATE 50 MG: 50 TABLET, FILM COATED ORAL at 21:05

## 2023-07-22 RX ADMIN — IBUPROFEN 600 MG: 600 TABLET, FILM COATED ORAL at 05:09

## 2023-07-22 RX ADMIN — ATORVASTATIN CALCIUM 10 MG: 10 TABLET, FILM COATED ORAL at 21:05

## 2023-07-22 RX ADMIN — METOPROLOL TARTRATE 50 MG: 50 TABLET, FILM COATED ORAL at 08:59

## 2023-07-22 RX ADMIN — AMLODIPINE BESYLATE 5 MG: 5 TABLET ORAL at 08:59

## 2023-07-22 RX ADMIN — DOCUSATE SODIUM 100 MG: 100 CAPSULE, LIQUID FILLED ORAL at 08:59

## 2023-07-22 RX ADMIN — ALBUTEROL SULFATE 1.25 MG: 1.25 SOLUTION RESPIRATORY (INHALATION) at 11:00

## 2023-07-22 RX ADMIN — ALBUTEROL SULFATE 1.25 MG: 1.25 SOLUTION RESPIRATORY (INHALATION) at 20:25

## 2023-07-22 RX ADMIN — PANTOPRAZOLE SODIUM 20 MG: 20 TABLET, DELAYED RELEASE ORAL at 05:09

## 2023-07-22 RX ADMIN — IPRATROPIUM BROMIDE AND ALBUTEROL SULFATE 3 ML: .5; 3 SOLUTION RESPIRATORY (INHALATION) at 15:31

## 2023-07-22 RX ADMIN — ASPIRIN 81 MG: 81 TABLET, COATED ORAL at 08:59

## 2023-07-22 RX ADMIN — ACETYLCYSTEINE 3 ML: 200 SOLUTION ORAL; RESPIRATORY (INHALATION) at 07:12

## 2023-07-22 RX ADMIN — GABAPENTIN 300 MG: 300 CAPSULE ORAL at 21:05

## 2023-07-22 RX ADMIN — ACETAMINOPHEN 650 MG: 325 TABLET ORAL at 13:58

## 2023-07-22 RX ADMIN — ACETYLCYSTEINE 3 ML: 200 SOLUTION ORAL; RESPIRATORY (INHALATION) at 15:31

## 2023-07-22 RX ADMIN — GABAPENTIN 300 MG: 300 CAPSULE ORAL at 05:09

## 2023-07-22 RX ADMIN — ACETYLCYSTEINE 3 ML: 200 SOLUTION ORAL; RESPIRATORY (INHALATION) at 23:50

## 2023-07-22 RX ADMIN — GABAPENTIN 300 MG: 300 CAPSULE ORAL at 13:58

## 2023-07-22 RX ADMIN — IBUPROFEN 600 MG: 600 TABLET, FILM COATED ORAL at 00:21

## 2023-07-22 RX ADMIN — OXYCODONE HYDROCHLORIDE AND ACETAMINOPHEN 1 TABLET: 5; 325 TABLET ORAL at 09:03

## 2023-07-22 RX ADMIN — IPRATROPIUM BROMIDE AND ALBUTEROL SULFATE 3 ML: .5; 3 SOLUTION RESPIRATORY (INHALATION) at 07:11

## 2023-07-22 RX ADMIN — OXYCODONE HYDROCHLORIDE AND ACETAMINOPHEN 1 TABLET: 5; 325 TABLET ORAL at 21:05

## 2023-07-22 RX ADMIN — IBUPROFEN 600 MG: 600 TABLET, FILM COATED ORAL at 12:11

## 2023-07-22 RX ADMIN — CEFAZOLIN 2 G: 2 INJECTION, POWDER, FOR SOLUTION INTRAMUSCULAR; INTRAVENOUS at 04:38

## 2023-07-22 RX ADMIN — ACETAMINOPHEN 650 MG: 325 TABLET ORAL at 03:03

## 2023-07-22 RX ADMIN — ENOXAPARIN SODIUM 40 MG: 100 INJECTION SUBCUTANEOUS at 09:03

## 2023-07-22 RX ADMIN — POLYETHYLENE GLYCOL 3350 17 G: 17 POWDER, FOR SOLUTION ORAL at 08:59

## 2023-07-22 RX ADMIN — ACETAMINOPHEN 650 MG: 325 TABLET ORAL at 09:00

## 2023-07-22 RX ADMIN — CITALOPRAM 20 MG: 20 TABLET, FILM COATED ORAL at 08:59

## 2023-07-22 RX ADMIN — IBUPROFEN 600 MG: 600 TABLET, FILM COATED ORAL at 18:20

## 2023-07-22 NOTE — PLAN OF CARE
Problem: Adult Inpatient Plan of Care  Goal: Plan of Care Review  Outcome: Ongoing, Progressing  Flowsheets (Taken 7/22/2023 4772)  Progress: no change  Plan of Care Reviewed With: patient  Outcome Evaluation: Chest tube to -20 cm dry suction. Catheter intact. Small amount of SQ emphysema on right side above chest tube. Provider notified, no new orders. Safety maintained.

## 2023-07-22 NOTE — PROGRESS NOTES
[facility] Cardiothoracic Surgery  PROGRESS NOTE   CC: Postop lung resection    Subjective:  Doing well today pain is well controlled no airleak on today's exam        Objective:      /50 (BP Location: Left arm, Patient Position: Sitting)   Pulse 82   Temp 97.5 °F (36.4 °C)   Resp 16   Wt 104 kg (228 lb 9.9 oz)   SpO2 97%   BMI 29.34 kg/m²       Intake/Output Summary (Last 24 hours) at 7/22/2023 1258  Last data filed at 7/22/2023 1019  Gross per 24 hour   Intake --   Output 788 ml   Net -788 ml       CT Output: 35 cc, no airleak    PE:  Vitals:    07/22/23 1106   BP:    Pulse: 82   Resp: 16   Temp:    SpO2: 97%     GENERAL: NAD, resting comfortably, normal color  CARDIOVASCULAR: regular, regular rate, sinus  PULMONARY: Normal bilateral breath sounds, no labored breathing  ABDOMEN: soft, nontender/nondistended  EXTREMITIES: mild peripheral edema, normal pulses, normal ROM        Lab Results (last 72 hours)       Procedure Component Value Units Date/Time    Basic Metabolic Panel [[id number]]  (Abnormal) Collected: 07/22/23 0415    Specimen: Blood Updated: 07/22/23 0501     Glucose 161 mg/dL      BUN 20 mg/dL      Creatinine 1.41 mg/dL      Sodium 137 mmol/L      Potassium 4.7 mmol/L      Chloride 100 mmol/L      CO2 20.0 mmol/L      Calcium 8.3 mg/dL      BUN/Creatinine Ratio 14.2     Anion Gap 17.0 mmol/L      eGFR 54.6 mL/min/1.73     Narrative:      GFR Normal >60  Chronic Kidney Disease <60  Kidney Failure <15      CBC (No Diff) [[id number]]  (Abnormal) Collected: 07/22/23 0415    Specimen: Blood Updated: 07/22/23 0441     WBC 14.47 10*3/mm3      RBC 4.46 10*6/mm3      Hemoglobin 13.7 g/dL      Hematocrit 44.8 %      .4 fL      MCH 30.7 pg      MCHC 30.6 g/dL      RDW 13.1 %      RDW-SD 48.8 fl      MPV 10.6 fL      Platelets 188 10*3/mm3     Tissue Pathology Exam [[id number]] Collected: 07/21/23 0832    Specimen: Tissue from Lymph Node; Tissue from Lymph Node; Tissue from  Lymph Node; Tissue from Lymph Node; Tissue from Lung, Right Upper Lobe Updated: 07/21/23 1259     Case Report --     Surgical Pathology Report                         Case: [id number]                                  Authorizing Provider:  Shaggy Philippe MD         Collected:           07/21/2023 09:29 AM          Ordering Location:     [facility] OR  Received:            07/21/2023 09:36 AM          Pathologist:           Vahid Cedillo MD                                                     Intraop:               Claire Benjamin MD                                                        Specimen:    Lung, Right Upper Lobe, Right Upper Lobe Wedge                                              Intraoperative Consultation --       Specimen: Lung, right upper lobe, wedge resection  FROZEN SECTION DIAGNOSIS: Adenocarcinoma.  No tumor identified at shaved parenchymal margin.  Reported to Dr. Shaggy Philippe on 7/21/2023 at 10:05 CDT by Claire Benjamin MD.      Basic Metabolic Panel [[id number]]  (Abnormal) Collected: 07/21/23 1112    Specimen: Blood Updated: 07/21/23 1144     Glucose 179 mg/dL      BUN 11 mg/dL      Creatinine 1.10 mg/dL      Sodium 137 mmol/L      Potassium 4.9 mmol/L      Chloride 102 mmol/L      CO2 21.0 mmol/L      Calcium 8.7 mg/dL      BUN/Creatinine Ratio 10.0     Anion Gap 14.0 mmol/L      eGFR 73.6 mL/min/1.73     Narrative:      GFR Normal >60  Chronic Kidney Disease <60  Kidney Failure <15      CBC (No Diff) [[id number]]  (Normal) Collected: 07/21/23 1112    Specimen: Blood Updated: 07/21/23 1128     WBC 10.39 10*3/mm3      RBC 4.97 10*6/mm3      Hemoglobin 15.6 g/dL      Hematocrit 47.9 %      MCV 96.4 fL      MCH 31.4 pg      MCHC 32.6 g/dL      RDW 13.0 %      RDW-SD 46.5 fl      MPV 10.2 fL      Platelets 209 10*3/mm3     POC Glucose Once [[id number]]  (Abnormal) Collected: 07/21/23 1059    Specimen: Blood Updated: 07/21/23 1112     Glucose 192 mg/dL      Comment: :  [id number] Zohaib Barbosa ID: [id number]       POC Glucose Once [[id number]]  (Abnormal) Collected: 07/21/23 0550    Specimen: Blood Updated: 07/21/23 0602     Glucose 132 mg/dL      Comment: : [id number] Ronny Low ID: [id number]                   CXR: Good lung expansion some minor fluid in the fissure on the right no pneumothorax subcu emphysema is improving    Assessment & Plan     68-year-old male postop day 1 from right robotic wedge resection and lymph node dissection for lung malignancy.  He is doing well overall.  Transition to waterseal today likely remove tube tomorrow.  Wean O2 as tolerated.  Recheck labs tomorrow given slightly elevated creatinine today.  Remove Hurley catheter.    Shaggy Philippe MD  Cardiothoracic Surgeon

## 2023-07-23 ENCOUNTER — APPOINTMENT (OUTPATIENT)
Dept: GENERAL RADIOLOGY | Facility: HOSPITAL | Age: 68
DRG: 165 | End: 2023-07-23
Payer: MEDICARE

## 2023-07-23 VITALS
WEIGHT: 228.62 LBS | TEMPERATURE: 97.6 F | DIASTOLIC BLOOD PRESSURE: 52 MMHG | SYSTOLIC BLOOD PRESSURE: 147 MMHG | OXYGEN SATURATION: 95 % | RESPIRATION RATE: 16 BRPM | BODY MASS INDEX: 29.34 KG/M2 | HEART RATE: 66 BPM

## 2023-07-23 LAB
ANION GAP SERPL CALCULATED.3IONS-SCNC: 13 MMOL/L (ref 5–15)
BUN SERPL-MCNC: 18 MG/DL (ref 8–23)
BUN/CREAT SERPL: 17 (ref 7–25)
CALCIUM SPEC-SCNC: 8.3 MG/DL (ref 8.6–10.5)
CHLORIDE SERPL-SCNC: 100 MMOL/L (ref 98–107)
CO2 SERPL-SCNC: 22 MMOL/L (ref 22–29)
CREAT SERPL-MCNC: 1.06 MG/DL (ref 0.76–1.27)
EGFRCR SERPLBLD CKD-EPI 2021: 76.9 ML/MIN/1.73
GLUCOSE SERPL-MCNC: 122 MG/DL (ref 65–99)
POTASSIUM SERPL-SCNC: 4.5 MMOL/L (ref 3.5–5.2)
SODIUM SERPL-SCNC: 135 MMOL/L (ref 136–145)

## 2023-07-23 PROCEDURE — 71045 X-RAY EXAM CHEST 1 VIEW: CPT

## 2023-07-23 PROCEDURE — 80048 BASIC METABOLIC PNL TOTAL CA: CPT | Performed by: SURGERY

## 2023-07-23 PROCEDURE — 71046 X-RAY EXAM CHEST 2 VIEWS: CPT

## 2023-07-23 PROCEDURE — 94799 UNLISTED PULMONARY SVC/PX: CPT

## 2023-07-23 PROCEDURE — 94664 DEMO&/EVAL PT USE INHALER: CPT

## 2023-07-23 PROCEDURE — 25010000002 ENOXAPARIN PER 10 MG: Performed by: SURGERY

## 2023-07-23 RX ORDER — TRAMADOL HYDROCHLORIDE 50 MG/1
50 TABLET ORAL EVERY 6 HOURS PRN
Qty: 25 TABLET | Refills: 0 | Status: SHIPPED | OUTPATIENT
Start: 2023-07-23

## 2023-07-23 RX ADMIN — ACETAMINOPHEN 650 MG: 325 TABLET ORAL at 13:10

## 2023-07-23 RX ADMIN — CITALOPRAM 20 MG: 20 TABLET, FILM COATED ORAL at 08:45

## 2023-07-23 RX ADMIN — OXYCODONE HYDROCHLORIDE AND ACETAMINOPHEN 1 TABLET: 5; 325 TABLET ORAL at 08:50

## 2023-07-23 RX ADMIN — ACETYLCYSTEINE 3 ML: 200 SOLUTION ORAL; RESPIRATORY (INHALATION) at 05:47

## 2023-07-23 RX ADMIN — ENOXAPARIN SODIUM 40 MG: 100 INJECTION SUBCUTANEOUS at 08:45

## 2023-07-23 RX ADMIN — PANTOPRAZOLE SODIUM 20 MG: 20 TABLET, DELAYED RELEASE ORAL at 06:16

## 2023-07-23 RX ADMIN — AMLODIPINE BESYLATE 5 MG: 5 TABLET ORAL at 08:45

## 2023-07-23 RX ADMIN — GABAPENTIN 300 MG: 300 CAPSULE ORAL at 13:10

## 2023-07-23 RX ADMIN — IBUPROFEN 600 MG: 600 TABLET, FILM COATED ORAL at 00:16

## 2023-07-23 RX ADMIN — IBUPROFEN 600 MG: 600 TABLET, FILM COATED ORAL at 11:53

## 2023-07-23 RX ADMIN — DOCUSATE SODIUM 100 MG: 100 CAPSULE, LIQUID FILLED ORAL at 08:45

## 2023-07-23 RX ADMIN — IBUPROFEN 600 MG: 600 TABLET, FILM COATED ORAL at 06:16

## 2023-07-23 RX ADMIN — ALBUTEROL SULFATE 1.25 MG: 1.25 SOLUTION RESPIRATORY (INHALATION) at 09:47

## 2023-07-23 RX ADMIN — METOPROLOL TARTRATE 50 MG: 50 TABLET, FILM COATED ORAL at 08:45

## 2023-07-23 RX ADMIN — POLYETHYLENE GLYCOL 3350 17 G: 17 POWDER, FOR SOLUTION ORAL at 08:46

## 2023-07-23 RX ADMIN — ALBUTEROL SULFATE 1.25 MG: 1.25 SOLUTION RESPIRATORY (INHALATION) at 05:47

## 2023-07-23 RX ADMIN — ASPIRIN 81 MG: 81 TABLET, COATED ORAL at 08:45

## 2023-07-23 RX ADMIN — GABAPENTIN 300 MG: 300 CAPSULE ORAL at 06:15

## 2023-07-23 NOTE — PROGRESS NOTES
[facility] Cardiothoracic Surgery  PROGRESS NOTE   CC: RUL Lung Cancer    Subjective:  Doing well, no pain.  No air leak, anxious to go home    ROS: No fevers or chills, hemodynamically stable    Objective:      /52 (BP Location: Left arm, Patient Position: Lying)   Pulse 66   Temp 97.6 °F (36.4 °C) (Oral)   Resp 16   Wt 104 kg (228 lb 9.9 oz)   SpO2 95%   BMI 29.34 kg/m²       Intake/Output Summary (Last 24 hours) at 7/23/2023 1210  Last data filed at 7/23/2023 0732  Gross per 24 hour   Intake 360 ml   Output 3052 ml   Net -2692 ml       CT Output: 55cc, no air leak with tidaling    PE:  Vitals:    07/23/23 0953   BP:    Pulse: 66   Resp: 16   Temp:    SpO2:      GENERAL: NAD, resting comfortably, normal color  CARDIOVASCULAR: regular, regular rate, sinus  PULMONARY: Normal bilateral breath sounds, no labored breathing  ABDOMEN: soft, nontender/nondistended  EXTREMITIES: mild peripheral edema, normal pulses, normal ROM        Lab Results (last 72 hours)       Procedure Component Value Units Date/Time    Basic Metabolic Panel [[id number]]  (Abnormal) Collected: 07/23/23 1056    Specimen: Blood Updated: 07/23/23 1157     Glucose 122 mg/dL      BUN 18 mg/dL      Creatinine 1.06 mg/dL      Sodium 135 mmol/L      Potassium 4.5 mmol/L      Chloride 100 mmol/L      CO2 22.0 mmol/L      Calcium 8.3 mg/dL      BUN/Creatinine Ratio 17.0     Anion Gap 13.0 mmol/L      eGFR 76.9 mL/min/1.73     Narrative:      GFR Normal >60  Chronic Kidney Disease <60  Kidney Failure <15      Basic Metabolic Panel [[id number]]  (Abnormal) Collected: 07/22/23 0415    Specimen: Blood Updated: 07/22/23 0501     Glucose 161 mg/dL      BUN 20 mg/dL      Creatinine 1.41 mg/dL      Sodium 137 mmol/L      Potassium 4.7 mmol/L      Chloride 100 mmol/L      CO2 20.0 mmol/L      Calcium 8.3 mg/dL      BUN/Creatinine Ratio 14.2     Anion Gap 17.0 mmol/L      eGFR 54.6 mL/min/1.73     Narrative:      GFR Normal >60  Chronic  Kidney Disease <60  Kidney Failure <15      CBC (No Diff) [[id number]]  (Abnormal) Collected: 07/22/23 0415    Specimen: Blood Updated: 07/22/23 0441     WBC 14.47 10*3/mm3      RBC 4.46 10*6/mm3      Hemoglobin 13.7 g/dL      Hematocrit 44.8 %      .4 fL      MCH 30.7 pg      MCHC 30.6 g/dL      RDW 13.1 %      RDW-SD 48.8 fl      MPV 10.6 fL      Platelets 188 10*3/mm3     Tissue Pathology Exam [[id number]] Collected: 07/21/23 0832    Specimen: Tissue from Lymph Node; Tissue from Lymph Node; Tissue from Lymph Node; Tissue from Lymph Node; Tissue from Lung, Right Upper Lobe Updated: 07/21/23 1259     Case Report --     Surgical Pathology Report                         Case: [id number]                                  Authorizing Provider:  Shaggy Philippe MD         Collected:           07/21/2023 09:29 AM          Ordering Location:     [facility] OR  Received:            07/21/2023 09:36 AM          Pathologist:           Vahid Cedillo MD                                                     Intraop:               Claire Benjamin MD                                                        Specimen:    Lung, Right Upper Lobe, Right Upper Lobe Wedge                                              Intraoperative Consultation --       Specimen: Lung, right upper lobe, wedge resection  FROZEN SECTION DIAGNOSIS: Adenocarcinoma.  No tumor identified at shaved parenchymal margin.  Reported to Dr. Shaggy Philippe on 7/21/2023 at 10:05 CDT by Claire Benjamin MD.      Basic Metabolic Panel [[id number]]  (Abnormal) Collected: 07/21/23 1112    Specimen: Blood Updated: 07/21/23 1144     Glucose 179 mg/dL      BUN 11 mg/dL      Creatinine 1.10 mg/dL      Sodium 137 mmol/L      Potassium 4.9 mmol/L      Chloride 102 mmol/L      CO2 21.0 mmol/L      Calcium 8.7 mg/dL      BUN/Creatinine Ratio 10.0     Anion Gap 14.0 mmol/L      eGFR 73.6 mL/min/1.73     Narrative:      GFR Normal >60  Chronic Kidney Disease  <60  Kidney Failure <15      CBC (No Diff) [[id number]]  (Normal) Collected: 07/21/23 1112    Specimen: Blood Updated: 07/21/23 1128     WBC 10.39 10*3/mm3      RBC 4.97 10*6/mm3      Hemoglobin 15.6 g/dL      Hematocrit 47.9 %      MCV 96.4 fL      MCH 31.4 pg      MCHC 32.6 g/dL      RDW 13.0 %      RDW-SD 46.5 fl      MPV 10.2 fL      Platelets 209 10*3/mm3     POC Glucose Once [[id number]]  (Abnormal) Collected: 07/21/23 1059    Specimen: Blood Updated: 07/21/23 1112     Glucose 192 mg/dL      Comment: : [id number] Zohaib McknightuaMeter ID: [id number]       POC Glucose Once [[id number]]  (Abnormal) Collected: 07/21/23 0550    Specimen: Blood Updated: 07/21/23 0602     Glucose 132 mg/dL      Comment: : [id number] Ronny CondonMeter ID: [id number]                   CXR: very small apical PTX, other stable, improved subQ emphysema    Assessment & Plan     68-year-old male postop day 2 from right robotic wedge resection and lymph node dissection for lung malignancy. He is doing well overall.     Oxygen removed this morning, sats were 96% on 2L, needs 90% and wean as tolerated  Chest tube removed  Repeat CXR at 1430, if okay then ok for d/c home  Standard follow up    Shaggy Philippe MD  Cardiothoracic Surgeon

## 2023-07-23 NOTE — NURSING NOTE
"Call placed to Dr Philippe to inform of 1430 CXR report.  \"Ok for discharge\" per Dr Philippe.  "

## 2023-07-23 NOTE — PLAN OF CARE
Goal Outcome Evaluation:  Plan of Care Reviewed With: patient, spouse        Progress: no change  Outcome Evaluation: Chest tube to water seal, small air leak noted, sub q emphysema noted near site, serosang drainage noted, up ad gurvinder, wife at bedside

## 2023-07-23 NOTE — PLAN OF CARE
Goal Outcome Evaluation:           Progress: improving     VSS.  UP ad gurvinder.  Voiding.  Denies pain or SOA.  Dr Philippe notified about CXR report.  Discharge cleared by Dr Philippe.  Verbalizes understanding of all discharge instructions.

## 2023-07-24 ENCOUNTER — TELEPHONE (OUTPATIENT)
Dept: CARDIAC SURGERY | Facility: CLINIC | Age: 68
End: 2023-07-24
Payer: MEDICARE

## 2023-07-24 ENCOUNTER — TRANSITIONAL CARE MANAGEMENT TELEPHONE ENCOUNTER (OUTPATIENT)
Dept: CALL CENTER | Facility: HOSPITAL | Age: 68
End: 2023-07-24
Payer: MEDICARE

## 2023-07-24 NOTE — PAYOR COMM NOTE
"ADMIT INPT 7-21-23  [id number]    [id number]    Rupa Verma (68 y.o. Male)       Date of Birth   1955    Social Security Number       Address   [address] [address]    Home Phone   [phone]    MRN   [id number]       Adventism   Mormonism    Marital Status                               Admission Date   7/21/23    Admission Type   Elective    Admitting Provider   Philippe, Shaggy DELGADO MD    Attending Provider       Department, Room/Bed   [facility] 3C, 374/1       Discharge Date   7/23/2023    Discharge Disposition   Home or Self Care    Discharge Destination                                 Attending Provider: (none)   Allergies: No Known Allergies    Isolation: None   Infection: None   Code Status: Prior    Ht: 188 cm (74.02\")   Wt: 104 kg (228 lb 9.9 oz)    Admission Cmt: None   Principal Problem: Adenocarcinoma, lung, right [C34.91]                   Active Insurance as of 7/21/2023       Primary Coverage       Payor Plan Insurance Group Employer/Plan Group    [facility] MEDICARE REPLACEMENT [facility] DUAL COMPLETE MEDICARE REPLACEMENT [id number]       Payor Plan Address Payor Plan Phone Number Payor Plan Fax Number Effective Dates    [address] [phone]  5/1/2023 - None Entered    [facility] [address]         Subscriber Name Subscriber Birth Date Member ID       RUPA VERMA 1955 [id number]               Secondary Coverage       Payor Plan Insurance Group Employer/Plan Group    [facility] MEDICAID        Payor Plan Address Payor Plan Phone Number Payor Plan Fax Number Effective Dates    [address] [address] [phone]  6/5/2023 - None Entered    [facility] [id number]         Subscriber Name Subscriber Birth Date Member ID       RUPA VERMA 1955 [id number]                     Emergency Contacts        (Rel.) Home Phone Work Phone Mobile Phone    DANIELDODIE (Spouse) [phone] -- --    DANIEL MUNGUIADANDYMIREILLE (Son) " [phone] -- --                 History & Physical        Shaggy Philippe MD at 07/21/23 5152          Since H&P have reviewed PFT's, DLCO is not adequate to tolerate anatomic resection.  Will proceed with robotic RUL wedge resection, MLND; discussed risks and benefits and he understands and agrees to proceed.    Shaggy Philippe M.D.  Cardiothoracic Surgeon        Electronically signed by Shaggy Philippe MD at 07/21/23 7905   Source Note          Thoracic Surgery Consultation    Referring physician: Dr. Rj Fritz      Primary care physician: ARIAN Waller    He is accompained by an adult female.       Chief Complaint   Patient presents with    Adenocarcinoma of right Lung     New patient referred from Dr. Fritz       Subjective    History of Present Illness    The patient states he is feeling a little better. He states he is coughing up blood. He reports he completed x-rays requested by Dr. Fritz. He adds he is uncertain on why he was requested to complete the x-rays. He states he has been smoking since he was 15 years old, and he smokes a pack a day.  He reports he is short of breath. He reports he is uncertain if he would have to take a break walking up a flight of stairs. He denies previous surgeries on his heart, lungs, or chest. He currently denies heart problems. He reports he takes aspirin.        Review of Systems     A complete review of systems was performed, is negative except stated above.    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    Hypertension     Left upper lobe pulmonary nodule 09/12/2019     Past Surgical History:   Procedure Laterality Date    BRONCHOSCOPY WITH ION ROBOTIC ASSIST N/A 6/26/2023    Procedure: BRONCHOSCOPY WITH ION ROBOT;  Surgeon: Rj Fritz MD;  Location: [facility];  Service: Robotics - Pulmonary;  Laterality: N/A;  pre  lung mass   post lung mass   Carol Ann Sapp    CHOLECYSTECTOMY      COLONOSCOPY      ENDOSCOPY      EYE SURGERY Right     cataract surgery    PATELLA FRACTURE SURGERY Left      Family History   Problem Relation Age of Onset    Hypertension Mother     No Known Problems Father     Diabetes Neg Hx     Cancer Neg Hx     Stroke Neg Hx      Social History     Tobacco Use    Smoking status: Every Day     Packs/day: 1.00     Years: 53.00     Pack years: 53.00     Types: Cigarettes     Start date: 1970    Smokeless tobacco: Never   Vaping Use    Vaping Use: Never used   Substance Use Topics    Alcohol use: Yes     Alcohol/week: 42.0 standard drinks     Types: 42 Cans of beer per week     Comment: 6 pack of beer per day    Drug use: No     Current Outpatient Medications   Medication Sig Dispense Refill    amLODIPine (NORVASC) 5 MG tablet Take 1 tablet by mouth Daily. 90 tablet 1    aspirin 81 MG tablet aspirin 81 mg tablet,delayed release      citalopram (CeleXA) 20 MG tablet Take 1 tablet by mouth Daily. 90 tablet 1    gabapentin (NEURONTIN) 300 MG capsule Take 1 capsule by mouth 3 (Three) Times a Day.      HYDROcodone-acetaminophen (NORCO) 7.5-325 MG per tablet Take 1 tablet by mouth Every 12 (Twelve) Hours As Needed.      metFORMIN (GLUCOPHAGE) 500 MG tablet Take 1 tablet by mouth Daily With Breakfast. 90 tablet 1    metoprolol tartrate (LOPRESSOR) 50 MG tablet Take 1 tablet by mouth 2 (Two) Times a Day. 180 tablet 1    naloxone (NARCAN) 4 MG/0.1ML nasal spray 1 spray into the nostril(s) as directed by provider As Needed. For Hydrocodone prescription      pantoprazole (PROTONIX) 20 MG EC tablet Take 1 tablet by mouth Daily. 90 tablet 1    simvastatin (ZOCOR) 40 MG tablet Take 1 tablet by mouth Daily. 90 tablet 1     Current Facility-Administered Medications   Medication Dose Route Frequency Provider Last Rate Last Admin    cyanocobalamin injection 1,000 mcg  1,000 mcg Intramuscular Q28 Days Carol Ann Sapp, APRN   1,000  "mcg at 06/30/23 0912   Allergies:  Patient has no known allergies.    Objective     Vital Signs  Visit Vitals  /64 (BP Location: Right arm, Patient Position: Sitting, Cuff Size: Adult)   Pulse 75   Ht 187 cm (73.62\")   Wt 105 kg (232 lb)   SpO2 94%   BMI 30.09 kg/m²       Physical Exam  /64 (BP Location: Right arm, Patient Position: Sitting, Cuff Size: Adult)   Pulse 75   Ht 187 cm (73.62\")   Wt 105 kg (232 lb)   SpO2 94%   BMI 30.09 kg/m²     General Appearance:  Alert, cooperative, no distress, appears stated age   Head:  Normocephalic, without obvious abnormality, atraumatic   Eyes:  PERRL, conjunctiva/corneas clear, EOM's intact, fundi benign, both eyes   Ears:  Normal TM's and external ear canals, both ears   Nose: Nares normal, septum midline, mucosa normal, no drainage or sinus tenderness   Throat: Lips, mucosa, and tongue normal; teeth and gums normal   Neck: Supple, symmetrical, trachea midline, no adenopathy, thyroid: not enlarged, symmetric, no tenderness/mass/nodules, no carotid bruit or JVD   Back:   Symmetric, no curvature, ROM normal, no CVA tenderness   Lungs:   Clear to auscultation bilaterally, respirations unlabored   Chest Wall:  No tenderness or deformity   Heart:  Regular rate and rhythm, S1, S2 normal, no murmur, rub or gallop   Abdomen:   Soft, non-tender, bowel sounds active all four quadrants,  no masses, no organomegaly   Genitalia:  Normal male   Rectal:  Normal tone, normal prostate, no masses or tenderness;  guaiac negative stool   Extremities: Extremities normal, atraumatic, no cyanosis or edema   Pulses: 2+ and symmetric   Skin: Skin color, texture, turgor normal, no rashes or lesions   Lymph nodes: Cervical, supraclavicular, and axillary nodes normal   Neurologic: Normal     Results Review:     WBC   Date Value Ref Range Status   06/19/2023 9.56 3.40 - 10.80 10*3/mm3 Final   12/29/2022 10.3 3.4 - 10.8 x10E3/uL Final     RBC   Date Value Ref Range Status   06/19/2023 " 5.07 4.14 - 5.80 10*6/mm3 Final   12/29/2022 5.37 4.14 - 5.80 x10E6/uL Final     Hemoglobin   Date Value Ref Range Status   06/19/2023 15.7 13.0 - 17.7 g/dL Final     Hematocrit   Date Value Ref Range Status   06/19/2023 48.7 37.5 - 51.0 % Final     MCV   Date Value Ref Range Status   06/19/2023 96.1 79.0 - 97.0 fL Final     MCH   Date Value Ref Range Status   06/19/2023 31.0 26.6 - 33.0 pg Final     MCHC   Date Value Ref Range Status   06/19/2023 32.2 31.5 - 35.7 g/dL Final     RDW   Date Value Ref Range Status   06/19/2023 13.2 12.3 - 15.4 % Final     RDW-SD   Date Value Ref Range Status   06/19/2023 46.9 37.0 - 54.0 fl Final     MPV   Date Value Ref Range Status   06/19/2023 10.4 6.0 - 12.0 fL Final     Platelets   Date Value Ref Range Status   06/19/2023 226 140 - 450 10*3/mm3 Final     Neutrophil Rel %   Date Value Ref Range Status   12/29/2022 73 Not Estab. % Final     Lymphocyte Rel %   Date Value Ref Range Status   12/29/2022 13 Not Estab. % Final     Monocyte Rel %   Date Value Ref Range Status   12/29/2022 10 Not Estab. % Final     Eosinophil Rel %   Date Value Ref Range Status   12/29/2022 2 Not Estab. % Final     Basophil Rel %   Date Value Ref Range Status   12/29/2022 1 Not Estab. % Final     Neutrophils Absolute   Date Value Ref Range Status   12/29/2022 7.6 (H) 1.4 - 7.0 x10E3/uL Final     Lymphocytes Absolute   Date Value Ref Range Status   12/29/2022 1.3 0.7 - 3.1 x10E3/uL Final     Monocytes Absolute   Date Value Ref Range Status   12/29/2022 1.0 (H) 0.1 - 0.9 x10E3/uL Final     Eosinophils Absolute   Date Value Ref Range Status   12/29/2022 0.2 0.0 - 0.4 x10E3/uL Final     Basophils Absolute   Date Value Ref Range Status   12/29/2022 0.1 0.0 - 0.2 x10E3/uL Final     Glucose   Date Value Ref Range Status   06/19/2023 110 (H) 65 - 99 mg/dL Final     Sodium   Date Value Ref Range Status   06/19/2023 137 136 - 145 mmol/L Final     Potassium   Date Value Ref Range Status   06/19/2023 4.2 3.5 - 5.2  mmol/L Final     CO2   Date Value Ref Range Status   06/19/2023 24.0 22.0 - 29.0 mmol/L Final     Chloride   Date Value Ref Range Status   06/19/2023 100 98 - 107 mmol/L Final     Anion Gap   Date Value Ref Range Status   06/19/2023 13.0 5.0 - 15.0 mmol/L Final     Creatinine   Date Value Ref Range Status   06/19/2023 0.76 0.76 - 1.27 mg/dL Final     BUN   Date Value Ref Range Status   06/19/2023 12 8 - 23 mg/dL Final     BUN/Creatinine Ratio   Date Value Ref Range Status   06/19/2023 15.8 7.0 - 25.0 Final     Calcium   Date Value Ref Range Status   06/19/2023 9.3 8.6 - 10.5 mg/dL Final     eGFR Non  Am   Date Value Ref Range Status   12/27/2021 82 >59 mL/min/1.73 Final     Alkaline Phosphatase   Date Value Ref Range Status   12/29/2022 74 44 - 121 IU/L Final     ALT (SGPT)   Date Value Ref Range Status   12/29/2022 45 (H) 0 - 44 IU/L Final     AST (SGOT)   Date Value Ref Range Status   12/29/2022 38 0 - 40 IU/L Final     Total Bilirubin   Date Value Ref Range Status   12/29/2022 0.6 0.0 - 1.2 mg/dL Final     Albumin   Date Value Ref Range Status   12/29/2022 4.3 3.8 - 4.8 g/dL Final     A/G Ratio   Date Value Ref Range Status   12/29/2022 1.6 1.2 - 2.2 Final      I reviewed the patient's clinical results and discussed with patient.    CT Chest::FINDINGS:     Redemonstrated spiculated 1.7 cm RIGHT upper lobe pulmonary nodule on  image 77. This nodule is increase in size compared to a study from  10/08/2022 at which time it measured 10 mm. No change in LEFT upper lobe  8 mm nodule on image 47 and groundglass 8 mm nodule on image 48.     Central airways are clear. No consolidation or pleural effusion.  Moderate emphysema.     No enlarged thoracic lymph nodes. Main pulmonary artery is nondilated.  Thoracic aorta is nonaneurysmal. Moderate coronary calcification. No  pericardial effusion.     No large thyroid nodule. No acute chest wall soft tissue abnormality.  Prior cholecystectomy. LEFT renal cyst no acute  osseous finding.     IMPRESSION:     1.  1.7 cm spiculated RIGHT upper lobe pulmonary nodule, highly  suspicious for neoplasms.      2.  No evidence of metastatic disease in chest.  This report was finalized on 06/26/2023 13:38 by Dr. Gerry Byrne MD.    PFTs:      PET Scan:Findings:  Background right hepatic lobe metabolic activity measures max SUV 3.8.     *  10 mm RIGHT upper lobe pulmonary nodule on image 151 has minimal  metabolic activity with max SUV of 2.1, far below physiologic background  level.     *  16 mm spiculated scarlike nodule in the LEFT upper lobe on image 159  and 168 has minimal metabolic activity with max SUV of 1.1, far below  physiologic background.     *  No hypermetabolic pulmonary nodule. No enlarged or hypermetabolic  thoracic lymph nodes.     *  No abnormal hypermetabolic activity in the neck.     *  No abnormal hypermetabolic activity in the abdomen or pelvis.     Non-FDG avid findings:     No acute orbital finding. Parotid glands demonstrate fatty replacement  but are otherwise unremarkable. Parapharyngeal fat planes are  maintained. No focal asymmetry in the aerodigestive tract. No large  thyroid nodule. No cervical lymphadenopathy. Mastoid air cells and  visualized paranasal sinuses are clear.     The central airways are clear. No consolidation or pleural effusion.  Mild centrilobular emphysema. No enlarged thoracic lymph nodes. Main  pulmonary artery is nondilated. Thoracic aorta is nonaneurysmal.  Moderate to heavy coronary atherosclerotic calcification. No pericardial  effusion.     Unenhanced liver, spleen, adrenal glands, and pancreas are unremarkable.  Prior cholecystectomy. No biliary ductal dilatation. The LEFT upper pole  renal cyst. No urolithiasis or hydronephrosis. No focal urinary bladder  wall thickening.     Colonic diverticulosis without evidence of diverticulitis. Normal  appendix. Tiny hiatal hernia. No abnormal bowel distention or evidence  of active bowel  inflammation. No ascites or free pelvic fluid. No pelvic  mass or pelvic collection. Small LEFT inguinal fat-containing hernia.  Prostate and seminal vesicles appear unremarkable.     Nonaneurysmal atherosclerotic abdominal aorta. No enlarged abdominal or  pelvic lymph nodes. No acute osseous finding.     IMPRESSION:     1.  10 mm RIGHT upper lobe pulmonary nodule and 16 mm spiculated  scarlike LEFT upper lobe pulmonary nodule both have only minimal  metabolic activity far below physiologic background. Favor these to be  related to an infectious or inflammatory process but recommend continued  imaging surveillance as an indolent neoplasm remains within the  differential.     2.  No abnormal hypermetabolic activity in the neck, chest, abdomen, or  pelvis.  This report was finalized on 10/18/2022 12:42 by Dr. Gerry Byrne MD.      I personally reviewed images of following exams, the following is my interpretation:    CT Chest: There is mild to moderate emphysematous changes that is heterogeneous and more upper lobe predominant spiculated right upper lobe lung nodule near the periphery laterally. Well-developed fissures and normal hilar anatomy. No significant adenopathy appreciated  PET Scan: Low metabolic activity at nodule of interest. No evidence of mediastinal hypermetabolic activity.         Bronchoscopic Biopsy:  Final Diagnosis   Lesion, right upper lobe lung, transbronchial biopsy:  Adenocarcinoma.   Electronically signed by Mele Hinojosa MD on 7/5/2023 at 1440             Assessment & Plan    Mr. Verma is a 68-year-old male who presents to me with a right upper lobe lung cancer, adenocarcinoma after bronchoscopic biopsy by Dr. Fritz. He has a strong smoking history. He has a strong smoking history, he has smoked for over at 50 years, at least 1 pack/day. He has another left lower lobe nodule that has been unchanged and I do not suspect malignancy. He does have some shortness of breath with daily  activity but is able to walk in from parking lot without break. He does not have much desire to quit smoking, we did discuss smoking cessation for greater than 5 minutes.      I discussed with him at length today the natural history of lung malignancy in cases such as his. I do not see evidence of metastatic spread, he will be a T1b N0 M0, stage I A2. Given this, I would recommend surgical resection, his nodule is borderline less than 2 cm, but in some views looks to be about 2.1.  We did discuss treatment alternatives of stereotactic radiation and wedge resection if his pulmonary reserve is not adequate to tolerate lobectomy. He does not want to consider stereotactic radiation, he would rather have surgery. We will plan on full PFT and if those are adequate, we will proceed with robotic right upper lobectomy with mediastinal lymph node dissection. We discussed the risk and benefits of surgery and alternatives including but not limited to bleeding, infection, injury to major vessels or organs, need for transfusion, need for conversion to open operation, pneumonia, prolonged airleak, risk of anesthesia, and/or death.   He understands and wants to proceed. We will plan on surgery a week from Friday.     I have reviewed his PFT since clinic visit, he has hypoxia on room air (O2 Sat 88%) and DLCO of 44%, given this not a candidate for anatomic lobectomy.  Will proceed with wedge resection and lymph node dissection.    Thank you for trusting me with the care of  Mr. Verma.  Please do not hesitate to call with any questions or concerns.    Shaggy Philippe MD   Cardiothoracic Surgeon        Transcribed from ambient dictation for Shaggy Philippe MD by Nancy Pyle.  07/13/23   12:05 CDT    Patient or patient representative verbalized consent to the visit recording.  I have personally performed the services described in this document as transcribed by the above individual, and it is both accurate and complete.          Electronically signed by Shaggy Philippe MD at 07/20/23 8249                 Shaggy Philippe MD at 07/13/23 6967          Thoracic Surgery Consultation    Referring physician: Dr. Rj Fritz      Primary care physician: ARIAN Waller    He is accompained by an adult female.       Chief Complaint   Patient presents with    Adenocarcinoma of right Lung     New patient referred from Dr. Fritz         Subjective    History of Present Illness    The patient states he is feeling a little better. He states he is coughing up blood. He reports he completed x-rays requested by Dr. Fritz. He adds he is uncertain on why he was requested to complete the x-rays. He states he has been smoking since he was 15 years old, and he smokes a pack a day.  He reports he is short of breath. He reports he is uncertain if he would have to take a break walking up a flight of stairs. He denies previous surgeries on his heart, lungs, or chest. He currently denies heart problems. He reports he takes aspirin.        Review of Systems     A complete review of systems was performed, is negative except stated above.    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    Hypertension     Left upper lobe pulmonary nodule 09/12/2019     Past Surgical History:   Procedure Laterality Date    BRONCHOSCOPY WITH ION ROBOTIC ASSIST N/A 6/26/2023    Procedure: BRONCHOSCOPY WITH ION ROBOT;  Surgeon: Rj Fritz MD;  Location: [facility];  Service: Robotics - Pulmonary;  Laterality: N/A;  pre lung mass   post lung mass   Carol Ann Sapp    CHOLECYSTECTOMY      COLONOSCOPY      ENDOSCOPY      EYE SURGERY Right     cataract surgery    PATELLA FRACTURE SURGERY Left      Family History   Problem Relation Age of Onset    Hypertension Mother     No Known Problems Father     Diabetes Neg Hx     Cancer Neg Hx     Stroke  "Neg Hx      Social History     Tobacco Use    Smoking status: Every Day     Packs/day: 1.00     Years: 53.00     Pack years: 53.00     Types: Cigarettes     Start date: 1970    Smokeless tobacco: Never   Vaping Use    Vaping Use: Never used   Substance Use Topics    Alcohol use: Yes     Alcohol/week: 42.0 standard drinks     Types: 42 Cans of beer per week     Comment: 6 pack of beer per day    Drug use: No     Current Outpatient Medications   Medication Sig Dispense Refill    amLODIPine (NORVASC) 5 MG tablet Take 1 tablet by mouth Daily. 90 tablet 1    aspirin 81 MG tablet aspirin 81 mg tablet,delayed release      citalopram (CeleXA) 20 MG tablet Take 1 tablet by mouth Daily. 90 tablet 1    gabapentin (NEURONTIN) 300 MG capsule Take 1 capsule by mouth 3 (Three) Times a Day.      HYDROcodone-acetaminophen (NORCO) 7.5-325 MG per tablet Take 1 tablet by mouth Every 12 (Twelve) Hours As Needed.      metFORMIN (GLUCOPHAGE) 500 MG tablet Take 1 tablet by mouth Daily With Breakfast. 90 tablet 1    metoprolol tartrate (LOPRESSOR) 50 MG tablet Take 1 tablet by mouth 2 (Two) Times a Day. 180 tablet 1    naloxone (NARCAN) 4 MG/0.1ML nasal spray 1 spray into the nostril(s) as directed by provider As Needed. For Hydrocodone prescription      pantoprazole (PROTONIX) 20 MG EC tablet Take 1 tablet by mouth Daily. 90 tablet 1    simvastatin (ZOCOR) 40 MG tablet Take 1 tablet by mouth Daily. 90 tablet 1     Current Facility-Administered Medications   Medication Dose Route Frequency Provider Last Rate Last Admin    cyanocobalamin injection 1,000 mcg  1,000 mcg Intramuscular Q28 Days Carol Ann Sapp APRN   1,000 mcg at 06/30/23 0912     Allergies:  Patient has no known allergies.    Objective     Vital Signs  Visit Vitals  /64 (BP Location: Right arm, Patient Position: Sitting, Cuff Size: Adult)   Pulse 75   Ht 187 cm (73.62\")   Wt 105 kg (232 lb)   SpO2 94%   BMI 30.09 kg/m²         Physical Exam  /64 (BP Location: " "Right arm, Patient Position: Sitting, Cuff Size: Adult)   Pulse 75   Ht 187 cm (73.62\")   Wt 105 kg (232 lb)   SpO2 94%   BMI 30.09 kg/m²     General Appearance:  Alert, cooperative, no distress, appears stated age   Head:  Normocephalic, without obvious abnormality, atraumatic   Eyes:  PERRL, conjunctiva/corneas clear, EOM's intact, fundi benign, both eyes   Ears:  Normal TM's and external ear canals, both ears   Nose: Nares normal, septum midline, mucosa normal, no drainage or sinus tenderness   Throat: Lips, mucosa, and tongue normal; teeth and gums normal   Neck: Supple, symmetrical, trachea midline, no adenopathy, thyroid: not enlarged, symmetric, no tenderness/mass/nodules, no carotid bruit or JVD   Back:   Symmetric, no curvature, ROM normal, no CVA tenderness   Lungs:   Clear to auscultation bilaterally, respirations unlabored   Chest Wall:  No tenderness or deformity   Heart:  Regular rate and rhythm, S1, S2 normal, no murmur, rub or gallop   Abdomen:   Soft, non-tender, bowel sounds active all four quadrants,  no masses, no organomegaly   Genitalia:  Normal male   Rectal:  Normal tone, normal prostate, no masses or tenderness;  guaiac negative stool   Extremities: Extremities normal, atraumatic, no cyanosis or edema   Pulses: 2+ and symmetric   Skin: Skin color, texture, turgor normal, no rashes or lesions   Lymph nodes: Cervical, supraclavicular, and axillary nodes normal   Neurologic: Normal       Results Review:     WBC   Date Value Ref Range Status   06/19/2023 9.56 3.40 - 10.80 10*3/mm3 Final   12/29/2022 10.3 3.4 - 10.8 x10E3/uL Final     RBC   Date Value Ref Range Status   06/19/2023 5.07 4.14 - 5.80 10*6/mm3 Final   12/29/2022 5.37 4.14 - 5.80 x10E6/uL Final     Hemoglobin   Date Value Ref Range Status   06/19/2023 15.7 13.0 - 17.7 g/dL Final     Hematocrit   Date Value Ref Range Status   06/19/2023 48.7 37.5 - 51.0 % Final     MCV   Date Value Ref Range Status   06/19/2023 96.1 79.0 - 97.0 fL " Final     MCH   Date Value Ref Range Status   06/19/2023 31.0 26.6 - 33.0 pg Final     MCHC   Date Value Ref Range Status   06/19/2023 32.2 31.5 - 35.7 g/dL Final     RDW   Date Value Ref Range Status   06/19/2023 13.2 12.3 - 15.4 % Final     RDW-SD   Date Value Ref Range Status   06/19/2023 46.9 37.0 - 54.0 fl Final     MPV   Date Value Ref Range Status   06/19/2023 10.4 6.0 - 12.0 fL Final     Platelets   Date Value Ref Range Status   06/19/2023 226 140 - 450 10*3/mm3 Final     Neutrophil Rel %   Date Value Ref Range Status   12/29/2022 73 Not Estab. % Final     Lymphocyte Rel %   Date Value Ref Range Status   12/29/2022 13 Not Estab. % Final     Monocyte Rel %   Date Value Ref Range Status   12/29/2022 10 Not Estab. % Final     Eosinophil Rel %   Date Value Ref Range Status   12/29/2022 2 Not Estab. % Final     Basophil Rel %   Date Value Ref Range Status   12/29/2022 1 Not Estab. % Final     Neutrophils Absolute   Date Value Ref Range Status   12/29/2022 7.6 (H) 1.4 - 7.0 x10E3/uL Final     Lymphocytes Absolute   Date Value Ref Range Status   12/29/2022 1.3 0.7 - 3.1 x10E3/uL Final     Monocytes Absolute   Date Value Ref Range Status   12/29/2022 1.0 (H) 0.1 - 0.9 x10E3/uL Final     Eosinophils Absolute   Date Value Ref Range Status   12/29/2022 0.2 0.0 - 0.4 x10E3/uL Final     Basophils Absolute   Date Value Ref Range Status   12/29/2022 0.1 0.0 - 0.2 x10E3/uL Final     Glucose   Date Value Ref Range Status   06/19/2023 110 (H) 65 - 99 mg/dL Final     Sodium   Date Value Ref Range Status   06/19/2023 137 136 - 145 mmol/L Final     Potassium   Date Value Ref Range Status   06/19/2023 4.2 3.5 - 5.2 mmol/L Final     CO2   Date Value Ref Range Status   06/19/2023 24.0 22.0 - 29.0 mmol/L Final     Chloride   Date Value Ref Range Status   06/19/2023 100 98 - 107 mmol/L Final     Anion Gap   Date Value Ref Range Status   06/19/2023 13.0 5.0 - 15.0 mmol/L Final     Creatinine   Date Value Ref Range Status   06/19/2023  0.76 0.76 - 1.27 mg/dL Final     BUN   Date Value Ref Range Status   06/19/2023 12 8 - 23 mg/dL Final     BUN/Creatinine Ratio   Date Value Ref Range Status   06/19/2023 15.8 7.0 - 25.0 Final     Calcium   Date Value Ref Range Status   06/19/2023 9.3 8.6 - 10.5 mg/dL Final     eGFR Non  Am   Date Value Ref Range Status   12/27/2021 82 >59 mL/min/1.73 Final     Alkaline Phosphatase   Date Value Ref Range Status   12/29/2022 74 44 - 121 IU/L Final     ALT (SGPT)   Date Value Ref Range Status   12/29/2022 45 (H) 0 - 44 IU/L Final     AST (SGOT)   Date Value Ref Range Status   12/29/2022 38 0 - 40 IU/L Final     Total Bilirubin   Date Value Ref Range Status   12/29/2022 0.6 0.0 - 1.2 mg/dL Final     Albumin   Date Value Ref Range Status   12/29/2022 4.3 3.8 - 4.8 g/dL Final     A/G Ratio   Date Value Ref Range Status   12/29/2022 1.6 1.2 - 2.2 Final        I reviewed the patient's clinical results and discussed with patient.    CT Chest::FINDINGS:     Redemonstrated spiculated 1.7 cm RIGHT upper lobe pulmonary nodule on  image 77. This nodule is increase in size compared to a study from  10/08/2022 at which time it measured 10 mm. No change in LEFT upper lobe  8 mm nodule on image 47 and groundglass 8 mm nodule on image 48.     Central airways are clear. No consolidation or pleural effusion.  Moderate emphysema.     No enlarged thoracic lymph nodes. Main pulmonary artery is nondilated.  Thoracic aorta is nonaneurysmal. Moderate coronary calcification. No  pericardial effusion.     No large thyroid nodule. No acute chest wall soft tissue abnormality.  Prior cholecystectomy. LEFT renal cyst no acute osseous finding.     IMPRESSION:     1.  1.7 cm spiculated RIGHT upper lobe pulmonary nodule, highly  suspicious for neoplasms.      2.  No evidence of metastatic disease in chest.  This report was finalized on 06/26/2023 13:38 by Dr. Gerry Byrne MD.    PFTs:      PET Scan:Findings:  Background right hepatic lobe  metabolic activity measures max SUV 3.8.     *  10 mm RIGHT upper lobe pulmonary nodule on image 151 has minimal  metabolic activity with max SUV of 2.1, far below physiologic background  level.     *  16 mm spiculated scarlike nodule in the LEFT upper lobe on image 159  and 168 has minimal metabolic activity with max SUV of 1.1, far below  physiologic background.     *  No hypermetabolic pulmonary nodule. No enlarged or hypermetabolic  thoracic lymph nodes.     *  No abnormal hypermetabolic activity in the neck.     *  No abnormal hypermetabolic activity in the abdomen or pelvis.     Non-FDG avid findings:     No acute orbital finding. Parotid glands demonstrate fatty replacement  but are otherwise unremarkable. Parapharyngeal fat planes are  maintained. No focal asymmetry in the aerodigestive tract. No large  thyroid nodule. No cervical lymphadenopathy. Mastoid air cells and  visualized paranasal sinuses are clear.     The central airways are clear. No consolidation or pleural effusion.  Mild centrilobular emphysema. No enlarged thoracic lymph nodes. Main  pulmonary artery is nondilated. Thoracic aorta is nonaneurysmal.  Moderate to heavy coronary atherosclerotic calcification. No pericardial  effusion.     Unenhanced liver, spleen, adrenal glands, and pancreas are unremarkable.  Prior cholecystectomy. No biliary ductal dilatation. The LEFT upper pole  renal cyst. No urolithiasis or hydronephrosis. No focal urinary bladder  wall thickening.     Colonic diverticulosis without evidence of diverticulitis. Normal  appendix. Tiny hiatal hernia. No abnormal bowel distention or evidence  of active bowel inflammation. No ascites or free pelvic fluid. No pelvic  mass or pelvic collection. Small LEFT inguinal fat-containing hernia.  Prostate and seminal vesicles appear unremarkable.     Nonaneurysmal atherosclerotic abdominal aorta. No enlarged abdominal or  pelvic lymph nodes. No acute osseous finding.     IMPRESSION:      1.  10 mm RIGHT upper lobe pulmonary nodule and 16 mm spiculated  scarlike LEFT upper lobe pulmonary nodule both have only minimal  metabolic activity far below physiologic background. Favor these to be  related to an infectious or inflammatory process but recommend continued  imaging surveillance as an indolent neoplasm remains within the  differential.     2.  No abnormal hypermetabolic activity in the neck, chest, abdomen, or  pelvis.  This report was finalized on 10/18/2022 12:42 by Dr. Gerry Byrne MD.      I personally reviewed images of following exams, the following is my interpretation:    CT Chest: There is mild to moderate emphysematous changes that is heterogeneous and more upper lobe predominant spiculated right upper lobe lung nodule near the periphery laterally. Well-developed fissures and normal hilar anatomy. No significant adenopathy appreciated  PET Scan: Low metabolic activity at nodule of interest. No evidence of mediastinal hypermetabolic activity.         Bronchoscopic Biopsy:  Final Diagnosis   Lesion, right upper lobe lung, transbronchial biopsy:  Adenocarcinoma.   Electronically signed by Mele Hinojosa MD on 7/5/2023 at 1440               Assessment & Plan    Mr. Verma is a 68-year-old male who presents to me with a right upper lobe lung cancer, adenocarcinoma after bronchoscopic biopsy by Dr. Fritz. He has a strong smoking history. He has a strong smoking history, he has smoked for over at 50 years, at least 1 pack/day. He has another left lower lobe nodule that has been unchanged and I do not suspect malignancy. He does have some shortness of breath with daily activity but is able to walk in from parking lot without break. He does not have much desire to quit smoking, we did discuss smoking cessation for greater than 5 minutes.      I discussed with him at length today the natural history of lung malignancy in cases such as his. I do not see evidence of metastatic spread, he  will be a T1b N0 M0, stage I A2. Given this, I would recommend surgical resection, his nodule is borderline less than 2 cm, but in some views looks to be about 2.1.  We did discuss treatment alternatives of stereotactic radiation and wedge resection if his pulmonary reserve is not adequate to tolerate lobectomy. He does not want to consider stereotactic radiation, he would rather have surgery. We will plan on full PFT and if those are adequate, we will proceed with robotic right upper lobectomy with mediastinal lymph node dissection. We discussed the risk and benefits of surgery and alternatives including but not limited to bleeding, infection, injury to major vessels or organs, need for transfusion, need for conversion to open operation, pneumonia, prolonged airleak, risk of anesthesia, and/or death.   He understands and wants to proceed. We will plan on surgery a week from Friday.     I have reviewed his PFT since clinic visit, he has hypoxia on room air (O2 Sat 88%) and DLCO of 44%, given this not a candidate for anatomic lobectomy.  Will proceed with wedge resection and lymph node dissection.    Thank you for trusting me with the care of  Mr. Verma.  Please do not hesitate to call with any questions or concerns.    Shaggy Philippe MD   Cardiothoracic Surgeon        Transcribed from ambient dictation for Shaggy Philippe MD by Nancy Pyle.  07/13/23   12:05 CDT    Patient or patient representative verbalized consent to the visit recording.  I have personally performed the services described in this document as transcribed by the above individual, and it is both accurate and complete.         Electronically signed by Shaggy Philippe MD at 07/20/23 1139       Emergency Department Notes    No notes of this type exist for this encounter.          Operative/Procedure Notes (last 7 days)        Shaggy Philippe MD at 07/21/23 0900          Cardiothoracic Surgery Operative Note     Date of Procedure:  7/21/2023    Pre-op diagnosis: Lung Cancer, Right Upper Lobe  Obese, BMI 30  Current Active Smoker  Hypertension  Hyperlipidemia  GERD  Type 2 Diabetes Mellitus  HYUN  COPD    Post-op diagnosis: Same    Procedure:  Right Robotic Assisted video-assisted thoracoscopic (VATS) Theraputic Wedge Resection  Right Robot Assisted video-assisted thoracoscopic (VATS) Lymph Node Dissection     Surgeon: Shaggy Philippe M.D.  Assistant: Mendel Encarnacion CFA  Anesthesia: GETA- double lumen  EBL: 50 mL  Drains: 24 Sami, Straight Right Chest Tube     This resection was performed for curative intent.    Specimens:  Right Upper Lobe Wedge Resection (frozen section consistent with NSCLC, margin negative with adequate margin)  Level 7 Lymph Node  Level 9 Lymph Node  Level 4R Lymph Node  Level 10 Sump Node    Operative indications:    Mr. Verma is a 68-year-old male with history of obesity, chronic current smoking, COPD.  He incidentally was noted to have a right upper lobe lung nodule.  He was followed by Dr. Rj Fritz and then underwent bronchoscopic biopsy that was consistent with adenocarcinoma.  He was referred to me for surgical reduction.  We performed PFTs and he did not have adequate lung reserve to tolerate anatomic lobectomy.  Given this I discussed with him proceeding with wedge resection with lymph node dissection, he understood the risk and benefits and agreed to proceed.    Operative findings     Intrathoracic Findings: Anthrascitic lungs, he had very friable lungs  Mass identified in right upper lobe with visceropleural puckering.  Wedge resection performed with adequate parenchymal margin     Operative description in detail:  The patient was taken to the operative suite where he was placed in a supine position. General anesthesia was induced without complication and a double lumen ET tube was placed by anesthesia. A timeout was performed. With that bronchoscopy was performed demonstrating no endobronchial lesion, normal  appearing right bronchi, and standard bronchial anatomy.     He was positioned in lateral decubitus position with right side up. All pressure points are protected. Single lung ventilation was initiated and double lumen endotracheal tube position confirmed.  A was then prepped and draped in the usual and sterile fashion.       An incision was made sharply in line with the middle axillary line at approximately the 8th intercostal space.  The pleural cavity was accessed and surveyed.  Additional robotic ports were placed in the 8th and 9th interspace.    Assistant port was placed laterally along the diaphragmatic margin under direct vision.  We then identified the right upper lobe mass and performed wedge resection with serial blue load staples.  Wedge resection was removed from the thorax with a protective bag and on examination did appear to contain nodule with adequate margin.      We then incised the pleura over level 4R lymph nodes and biopsied these lymph nodes.  The lung was retracted anteriorly and level 7 lymph nodes were identified and biopsied as well.  The inferior pulmonary ligament was divided up to the level of the inferior pulmonary vein, level 9 lymph node was taken.  I retracted the lung anteriorly and opened up the secondary carinal space and biopsied level 10 sump node.    Hemostasis was ensured in the operative site and Progel was applied over staple lines.  A 24 [place] straight chest tube was placed through the third port site after tunneling.  I performed intercostal blocks using a one-to-one Marcaine/Exparel mixture.  All ports were removed under direct vision ensuring adequate hemostasis.  The lung was allowed to expand under direct vision and both lower and upper lobes expanded well and filled the thorax.  All port sites and assistant site were closed in anatomical layers using absorbable suture.  Dressings were placed.  The chest tube was connected to 20 cm of suction.    The patient  tolerated the procedure well was extubated in the operating room and transferred the PACU in stable condition.  All needle sponge and instrument counts were correct at the end of the case.     Complications: None  Disposition: Transfer to PACU extubated and in stable condition.      Shaggy Philippe MD  Cardiothoracic Surgeon      Electronically signed by Shaggy Philippe MD at 07/21/23 1042          Physician Progress Notes (last 7 days)        Shaggy Philippe MD at 07/23/23 1210              [facility] Cardiothoracic Surgery  PROGRESS NOTE   CC: RUL Lung Cancer    Subjective:  Doing well, no pain.  No air leak, anxious to go home    ROS: No fevers or chills, hemodynamically stable    Objective:      /52 (BP Location: Left arm, Patient Position: Lying)   Pulse 66   Temp 97.6 °F (36.4 °C) (Oral)   Resp 16   Wt 104 kg (228 lb 9.9 oz)   SpO2 95%   BMI 29.34 kg/m²       Intake/Output Summary (Last 24 hours) at 7/23/2023 1210  Last data filed at 7/23/2023 0732  Gross per 24 hour   Intake 360 ml   Output 3052 ml   Net -2692 ml       CT Output: 55cc, no air leak with tidaling    PE:  Vitals:    07/23/23 0953   BP:    Pulse: 66   Resp: 16   Temp:    SpO2:      GENERAL: NAD, resting comfortably, normal color  CARDIOVASCULAR: regular, regular rate, sinus  PULMONARY: Normal bilateral breath sounds, no labored breathing  ABDOMEN: soft, nontender/nondistended  EXTREMITIES: mild peripheral edema, normal pulses, normal ROM        Lab Results (last 72 hours)       Procedure Component Value Units Date/Time    Basic Metabolic Panel [[id number]]  (Abnormal) Collected: 07/23/23 1056    Specimen: Blood Updated: 07/23/23 1157     Glucose 122 mg/dL      BUN 18 mg/dL      Creatinine 1.06 mg/dL      Sodium 135 mmol/L      Potassium 4.5 mmol/L      Chloride 100 mmol/L      CO2 22.0 mmol/L      Calcium 8.3 mg/dL      BUN/Creatinine Ratio 17.0     Anion Gap 13.0 mmol/L      eGFR 76.9 mL/min/1.73     Narrative:       GFR Normal >60  Chronic Kidney Disease <60  Kidney Failure <15      Basic Metabolic Panel [[id number]]  (Abnormal) Collected: 07/22/23 0415    Specimen: Blood Updated: 07/22/23 0501     Glucose 161 mg/dL      BUN 20 mg/dL      Creatinine 1.41 mg/dL      Sodium 137 mmol/L      Potassium 4.7 mmol/L      Chloride 100 mmol/L      CO2 20.0 mmol/L      Calcium 8.3 mg/dL      BUN/Creatinine Ratio 14.2     Anion Gap 17.0 mmol/L      eGFR 54.6 mL/min/1.73     Narrative:      GFR Normal >60  Chronic Kidney Disease <60  Kidney Failure <15      CBC (No Diff) [[id number]]  (Abnormal) Collected: 07/22/23 0415    Specimen: Blood Updated: 07/22/23 0441     WBC 14.47 10*3/mm3      RBC 4.46 10*6/mm3      Hemoglobin 13.7 g/dL      Hematocrit 44.8 %      .4 fL      MCH 30.7 pg      MCHC 30.6 g/dL      RDW 13.1 %      RDW-SD 48.8 fl      MPV 10.6 fL      Platelets 188 10*3/mm3     Tissue Pathology Exam [[id number]] Collected: 07/21/23 0832    Specimen: Tissue from Lymph Node; Tissue from Lymph Node; Tissue from Lymph Node; Tissue from Lymph Node; Tissue from Lung, Right Upper Lobe Updated: 07/21/23 1259     Case Report --     Surgical Pathology Report                         Case: [id number]                                  Authorizing Provider:  Shaggy Philippe MD         Collected:           07/21/2023 09:29 AM          Ordering Location:     [facility] OR  Received:            07/21/2023 09:36 AM          Pathologist:           Vahid Cedillo MD                                                     Intraop:               Claire Benjamin MD                                                        Specimen:    Lung, Right Upper Lobe, Right Upper Lobe Wedge                                              Intraoperative Consultation --       Specimen: Lung, right upper lobe, wedge resection  FROZEN SECTION DIAGNOSIS: Adenocarcinoma.  No tumor identified at shaved parenchymal margin.  Reported to Dr. Shaggy Philippe on  7/21/2023 at 10:05 CDT by Claire Benjamin MD.      Basic Metabolic Panel [[id number]]  (Abnormal) Collected: 07/21/23 1112    Specimen: Blood Updated: 07/21/23 1144     Glucose 179 mg/dL      BUN 11 mg/dL      Creatinine 1.10 mg/dL      Sodium 137 mmol/L      Potassium 4.9 mmol/L      Chloride 102 mmol/L      CO2 21.0 mmol/L      Calcium 8.7 mg/dL      BUN/Creatinine Ratio 10.0     Anion Gap 14.0 mmol/L      eGFR 73.6 mL/min/1.73     Narrative:      GFR Normal >60  Chronic Kidney Disease <60  Kidney Failure <15      CBC (No Diff) [[id number]]  (Normal) Collected: 07/21/23 1112    Specimen: Blood Updated: 07/21/23 1128     WBC 10.39 10*3/mm3      RBC 4.97 10*6/mm3      Hemoglobin 15.6 g/dL      Hematocrit 47.9 %      MCV 96.4 fL      MCH 31.4 pg      MCHC 32.6 g/dL      RDW 13.0 %      RDW-SD 46.5 fl      MPV 10.2 fL      Platelets 209 10*3/mm3     POC Glucose Once [[id number]]  (Abnormal) Collected: 07/21/23 1059    Specimen: Blood Updated: 07/21/23 1112     Glucose 192 mg/dL      Comment: : [id number] Zohaib McknightwilliamsMeter ID: [id number]       POC Glucose Once [[id number]]  (Abnormal) Collected: 07/21/23 0550    Specimen: Blood Updated: 07/21/23 0602     Glucose 132 mg/dL      Comment: : [id number] Ronny KristennyMeter ID: [id number]                   CXR: very small apical PTX, other stable, improved subQ emphysema    Assessment & Plan     68-year-old male postop day 2 from right robotic wedge resection and lymph node dissection for lung malignancy. He is doing well overall.     Oxygen removed this morning, sats were 96% on 2L, needs 90% and wean as tolerated  Chest tube removed  Repeat CXR at 1430, if okay then ok for d/c home  Standard follow up    Shaggy Philippe MD  Cardiothoracic Surgeon      Electronically signed by Shaggy Philippe MD at 07/23/23 1212       Shaggy Philippe MD at 07/22/23 1258              [facility] Cardiothoracic Surgery  PROGRESS NOTE   CC: Postop lung  resection    Subjective:  Doing well today pain is well controlled no airleak on today's exam        Objective:      /50 (BP Location: Left arm, Patient Position: Sitting)   Pulse 82   Temp 97.5 °F (36.4 °C)   Resp 16   Wt 104 kg (228 lb 9.9 oz)   SpO2 97%   BMI 29.34 kg/m²       Intake/Output Summary (Last 24 hours) at 7/22/2023 1258  Last data filed at 7/22/2023 1019  Gross per 24 hour   Intake --   Output 788 ml   Net -788 ml       CT Output: 35 cc, no airleak    PE:  Vitals:    07/22/23 1106   BP:    Pulse: 82   Resp: 16   Temp:    SpO2: 97%     GENERAL: NAD, resting comfortably, normal color  CARDIOVASCULAR: regular, regular rate, sinus  PULMONARY: Normal bilateral breath sounds, no labored breathing  ABDOMEN: soft, nontender/nondistended  EXTREMITIES: mild peripheral edema, normal pulses, normal ROM        Lab Results (last 72 hours)       Procedure Component Value Units Date/Time    Basic Metabolic Panel [[id number]]  (Abnormal) Collected: 07/22/23 0415    Specimen: Blood Updated: 07/22/23 0501     Glucose 161 mg/dL      BUN 20 mg/dL      Creatinine 1.41 mg/dL      Sodium 137 mmol/L      Potassium 4.7 mmol/L      Chloride 100 mmol/L      CO2 20.0 mmol/L      Calcium 8.3 mg/dL      BUN/Creatinine Ratio 14.2     Anion Gap 17.0 mmol/L      eGFR 54.6 mL/min/1.73     Narrative:      GFR Normal >60  Chronic Kidney Disease <60  Kidney Failure <15      CBC (No Diff) [[id number]]  (Abnormal) Collected: 07/22/23 0415    Specimen: Blood Updated: 07/22/23 0441     WBC 14.47 10*3/mm3      RBC 4.46 10*6/mm3      Hemoglobin 13.7 g/dL      Hematocrit 44.8 %      .4 fL      MCH 30.7 pg      MCHC 30.6 g/dL      RDW 13.1 %      RDW-SD 48.8 fl      MPV 10.6 fL      Platelets 188 10*3/mm3     Tissue Pathology Exam [[id number]] Collected: 07/21/23 0832    Specimen: Tissue from Lymph Node; Tissue from Lymph Node; Tissue from Lymph Node; Tissue from Lymph Node; Tissue from Lung, Right Upper Lobe Updated: 07/21/23  1259     Case Report --     Surgical Pathology Report                         Case: [id number]                                  Authorizing Provider:  Shaggy Philippe MD         Collected:           07/21/2023 09:29 AM          Ordering Location:     [facility] OR  Received:            07/21/2023 09:36 AM          Pathologist:           Vahid Cedillo MD                                                     Intraop:               Claire Benjamin MD                                                        Specimen:    Lung, Right Upper Lobe, Right Upper Lobe Wedge                                              Intraoperative Consultation --       Specimen: Lung, right upper lobe, wedge resection  FROZEN SECTION DIAGNOSIS: Adenocarcinoma.  No tumor identified at shaved parenchymal margin.  Reported to Dr. Shaggy Philippe on 7/21/2023 at 10:05 CDT by Claire Benjamin MD.      Basic Metabolic Panel [[id number]]  (Abnormal) Collected: 07/21/23 1112    Specimen: Blood Updated: 07/21/23 1144     Glucose 179 mg/dL      BUN 11 mg/dL      Creatinine 1.10 mg/dL      Sodium 137 mmol/L      Potassium 4.9 mmol/L      Chloride 102 mmol/L      CO2 21.0 mmol/L      Calcium 8.7 mg/dL      BUN/Creatinine Ratio 10.0     Anion Gap 14.0 mmol/L      eGFR 73.6 mL/min/1.73     Narrative:      GFR Normal >60  Chronic Kidney Disease <60  Kidney Failure <15      CBC (No Diff) [[id number]]  (Normal) Collected: 07/21/23 1112    Specimen: Blood Updated: 07/21/23 1128     WBC 10.39 10*3/mm3      RBC 4.97 10*6/mm3      Hemoglobin 15.6 g/dL      Hematocrit 47.9 %      MCV 96.4 fL      MCH 31.4 pg      MCHC 32.6 g/dL      RDW 13.0 %      RDW-SD 46.5 fl      MPV 10.2 fL      Platelets 209 10*3/mm3     POC Glucose Once [[id number]]  (Abnormal) Collected: 07/21/23 1059    Specimen: Blood Updated: 07/21/23 1112     Glucose 192 mg/dL      Comment: : [id number] Zohaib Rodriguezmartin ID: [id number]       POC Glucose Once [[id number]]  (Abnormal)  Collected: 07/21/23 0550    Specimen: Blood Updated: 07/21/23 0602     Glucose 132 mg/dL      Comment: : [id number] Ronny CondonMeter ID: [id number]                   CXR: Good lung expansion some minor fluid in the fissure on the right no pneumothorax subcu emphysema is improving    Assessment & Plan     68-year-old male postop day 1 from right robotic wedge resection and lymph node dissection for lung malignancy.  He is doing well overall.  Transition to waterseal today likely remove tube tomorrow.  Wean O2 as tolerated.  Recheck labs tomorrow given slightly elevated creatinine today.  Remove Hurley catheter.    Shaggy Philippe MD  Cardiothoracic Surgeon      Electronically signed by Shaggy Philippe MD at 07/22/23 1300       Consult Notes (last 7 days)  Notes from 07/17/23 through 07/24/23   No notes of this type exist for this encounter.       Discharge Summary    No notes of this type exist for this encounter.

## 2023-07-24 NOTE — TELEPHONE ENCOUNTER
Patient was discharged home over the weekend and needs 1 week follow-up with me with chest x-ray prior to appointment

## 2023-07-24 NOTE — OUTREACH NOTE
Call Center TCM Note      Flowsheet Row Responses   [facility] patient discharged from? [facility]   Does the patient have one of the following disease processes/diagnoses(primary or secondary)? Cardiothoracic surgery   TCM attempt successful? Yes   Call start time 1345   Call end time 1348   Discharge diagnosis RIGHT THORACOSCOPY WITH DAVINCI ROBOT WITH WEDGE RESECTION, POSSIBLE LOBECTOMY, MEDIASTINAL LYMPH NODE DISSECTION,   Meds reviewed with patient/caregiver? Yes   Is the patient having any side effects they believe may be caused by any medication additions or changes? No   Does the patient have all medications related to this admission filled (includes all antibiotics, pain medications, cardiac medications, etc.) Yes   Prescription comments Patient states the Tramadol is not helping. Patient advised to call surgeon's office to report this.   Is the patient taking all medications as directed (includes completed medication regime)? Yes   Does the patient have an appointment with their PCP within 7-14 days of discharge? No   Nursing Interventions Assisted patient with making appointment per protocol   Has home health visited the patient within 72 hours of discharge? N/A   Psychosocial issues? No   Did the patient receive a copy of their discharge instructions? Yes   Nursing interventions Reviewed instructions with patient   What is the patient's perception of their health status since discharge? Improving   Nursing interventions Nurse provided patient education   Is the patient/caregiver able to teach back normal signs of recovery? Pain or discomfort at incisional site, Depression or irritability, Constipation, Nausea and lack of appetite   Nursing interventions Reassured on normal signs of recovery   Is the patient /caregiver able to teach back basic post-op care? Hold pillow to support chest when coughing, Drive as instructed by MD in discharge instructions, Shower daily, No tub bath, swimming, or hot tub  until instructed by MD, Use a clean wash cloth and antibacterial bar or liquid soap to clean incisions, Lifting as instructed by MD in discharge instructions   Is the patient/caregiver able to teach back signs and symptoms of incisional infection? Fever, Increased drainage or bleeding, Increased redness, swelling or pain at the incisonal site, Incisional warmth, Pus or odor from incision   Is the patient/caregiver able to teach back steps to recovery at home? Set small, achievable goals for return to baseline health, Rest and rebuild strength, gradually increase activity, Make a list of questions for surgeon's appointment   If the patient is a current smoker, are they able to teach back resources for cessation? [id number]   Is the patient/caregiver able to teach back the hierarchy of who to call/visit for symptoms/problems? PCP, Specialist, Home health nurse, Urgent Care, ED, 911 Yes   TCM call completed? Yes   Call end time 1348   Would this patient benefit from a Referral to Amb Social Work? No   Is the patient interested in additional calls from an ambulatory ?  NOTE:  applies to high risk patients requiring additional follow-up. No            Erica Pinto LPN    7/24/2023, 13:59 CDT

## 2023-07-26 ENCOUNTER — OFFICE VISIT (OUTPATIENT)
Dept: FAMILY MEDICINE CLINIC | Facility: CLINIC | Age: 68
End: 2023-07-26
Payer: MEDICARE

## 2023-07-26 VITALS
OXYGEN SATURATION: 96 % | TEMPERATURE: 97.5 F | SYSTOLIC BLOOD PRESSURE: 138 MMHG | HEIGHT: 74 IN | HEART RATE: 72 BPM | DIASTOLIC BLOOD PRESSURE: 70 MMHG | WEIGHT: 231.8 LBS | BODY MASS INDEX: 29.75 KG/M2

## 2023-07-26 DIAGNOSIS — C34.91 ADENOCARCINOMA, LUNG, RIGHT: ICD-10-CM

## 2023-07-26 DIAGNOSIS — Z98.890 STATUS POST THORACOTOMY: ICD-10-CM

## 2023-07-26 DIAGNOSIS — Z92.89 HISTORY OF RECENT HOSPITALIZATION: Primary | ICD-10-CM

## 2023-07-26 RX ORDER — HYDROCODONE BITARTRATE AND ACETAMINOPHEN 7.5; 325 MG/1; MG/1
1 TABLET ORAL EVERY 6 HOURS PRN
Qty: 48 TABLET | Refills: 0 | Status: SHIPPED | OUTPATIENT
Start: 2023-07-26
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
## 2023-07-27 ENCOUNTER — TELEPHONE (OUTPATIENT)
Dept: CARDIAC SURGERY | Facility: CLINIC | Age: 68
End: 2023-07-27
Payer: MEDICARE

## 2023-08-03 LAB
CYTO UR: NORMAL
LAB AP CASE REPORT: NORMAL
LAB AP SYNOPTIC CHECKLIST: NORMAL
Lab: NORMAL
PATH REPORT.ADDENDUM SPEC: NORMAL
PATH REPORT.FINAL DX SPEC: NORMAL
PATH REPORT.GROSS SPEC: NORMAL

## 2023-08-04 ENCOUNTER — READMISSION MANAGEMENT (OUTPATIENT)
Dept: CALL CENTER | Facility: HOSPITAL | Age: 68
End: 2023-08-04
Payer: MEDICARE

## 2023-08-10 ENCOUNTER — HOSPITAL ENCOUNTER (OUTPATIENT)
Dept: GENERAL RADIOLOGY | Facility: HOSPITAL | Age: 68
Discharge: HOME | End: 2023-08-10
Admitting: NURSE PRACTITIONER
Payer: MEDICARE

## 2023-08-10 ENCOUNTER — OFFICE VISIT (OUTPATIENT)
Dept: CARDIAC SURGERY | Facility: CLINIC | Age: 68
End: 2023-08-10
Payer: MEDICARE

## 2023-08-10 VITALS
HEIGHT: 74 IN | SYSTOLIC BLOOD PRESSURE: 124 MMHG | WEIGHT: 234 LBS | DIASTOLIC BLOOD PRESSURE: 52 MMHG | OXYGEN SATURATION: 94 % | HEART RATE: 72 BPM | BODY MASS INDEX: 30.03 KG/M2

## 2023-08-10 DIAGNOSIS — Z72.0 TOBACCO USE: ICD-10-CM

## 2023-08-10 DIAGNOSIS — C34.91 ADENOCARCINOMA, LUNG, RIGHT: Primary | ICD-10-CM

## 2023-08-10 DIAGNOSIS — C34.91 ADENOCARCINOMA, LUNG, RIGHT: ICD-10-CM

## 2023-08-10 PROCEDURE — 3074F SYST BP LT 130 MM HG: CPT | Performed by: NURSE PRACTITIONER

## 2023-08-10 PROCEDURE — 71046 X-RAY EXAM CHEST 2 VIEWS: CPT

## 2023-08-10 PROCEDURE — 1160F RVW MEDS BY RX/DR IN RCRD: CPT | Performed by: NURSE PRACTITIONER

## 2023-08-10 PROCEDURE — 3078F DIAST BP <80 MM HG: CPT | Performed by: NURSE PRACTITIONER

## 2023-08-10 PROCEDURE — 99024 POSTOP FOLLOW-UP VISIT: CPT | Performed by: NURSE PRACTITIONER

## 2023-08-10 PROCEDURE — 1159F MED LIST DOCD IN RCRD: CPT | Performed by: NURSE PRACTITIONER

## 2023-08-10 RX ORDER — IBUPROFEN 800 MG/1
800 TABLET ORAL EVERY 6 HOURS PRN
COMMUNITY

## 2023-08-10 NOTE — PROGRESS NOTES
"Subjective   Chief Complaint   Patient presents with    Post-op Follow-up     Pt had Right Thoracoscopy on 07/21/2023    Pt is here for follow-up w/ CXR         Patient ID: Rupa Verma is a 68 y.o. male who is here for follow-up having had Right Robotic Assisted video-assisted thoracoscopic (VATS) Theraputic Wedge Resection, Right Robot Assisted video-assisted thoracoscopic (VATS) Lymph Node Dissection by Dr. Philippe on 7/21/2022     History of Present Illness  Post operative recovery was uneventful without any major complications. Sleep habits are good. Pain control has been good.  He does complain of skin sensitivity on the right chest since nerve block has worn off but overall he states he is doing very well. No fevers/sweats/chills. No drainage from incisions.   Walking without difficulty and shortness of breath is minimal.  He does continue to smoke.      The following portions of the patient's history were reviewed and updated as appropriate: allergies, current medications, past family history, past medical history, past social history, past surgical history and problem list.    Review of Systems   Constitutional:  Negative for chills, diaphoresis, fatigue and fever.   HENT:  Negative for trouble swallowing and voice change.    Respiratory:  Negative for chest tightness and shortness of breath.    Cardiovascular:  Negative for chest pain, palpitations and leg swelling.   Gastrointestinal:  Negative for abdominal pain, diarrhea, nausea and vomiting.   Musculoskeletal:  Negative for arthralgias and myalgias.   Skin:  Negative for color change, pallor, rash and wound.   Neurological:  Negative for dizziness, syncope and light-headedness.   Psychiatric/Behavioral:  Negative for agitation, confusion and sleep disturbance.      Objective   Visit Vitals  /52 (BP Location: Right arm, Patient Position: Sitting, Cuff Size: Adult)   Pulse 72   Ht 188 cm (74\")   Wt 106 kg (234 lb)   SpO2 94%   BMI 30.04 kg/mý "       Physical Exam  Vitals reviewed.   Constitutional:       General: He is not in acute distress.  HENT:      Head: Normocephalic.   Eyes:      Pupils: Pupils are equal, round, and reactive to light.   Cardiovascular:      Rate and Rhythm: Normal rate and regular rhythm.      Heart sounds: Normal heart sounds. No murmur heard.  Pulmonary:      Breath sounds: Normal breath sounds. No wheezing or rales.      Comments: Thoracic incisions are clean dry and intact and healing nicely.  Abdominal:      General: There is no distension.      Palpations: Abdomen is soft.      Tenderness: There is no abdominal tenderness.   Musculoskeletal:         General: No swelling or tenderness.   Skin:     General: Skin is warm and dry.   Neurological:      General: No focal deficit present.      Mental Status: He is alert and oriented to person, place, and time.   Psychiatric:         Mood and Affect: Mood normal.         Behavior: Behavior normal.         Thought Content: Thought content normal.         Judgment: Judgment normal.         Assessment & Plan       Independent Review of Radiographic Studies:    CXR: No pneumothorax.  Trace right pleural effusion        Diagnoses and all orders for this visit:    1. Adenocarcinoma, lung, right (Primary)  -     Ambulatory Referral to Oncology    2. Tobacco use             Overall, Rupa Verma is doing well.  Surgical incisions are healing nicely.  No purulent drainage, erythema, or warmth.  We discussed that skin sensitivity on the right chest wall should improve with time.  Pathology results reviewed with patient and we will refer to oncology.  He has no preference on who he sees and referral has been placed to [facility].  He does continue to smoke and we discussed the importance of smoking cessation.  Following post op thoracic surgery home instructions. Provided support and encouragement. All questions have been answered to the best of my ability.  Patient has  been instructed to contact our office with any questions or concerns should they arise.  He can follow-up with CT surgery on an as-needed basis.  Patient and his wife verbalized understanding and are agreeable.      Rupa Verma  reports that he has been smoking cigarettes. He started smoking about 53 years ago. He has a 53.00 pack-year smoking history. He has never been exposed to tobacco smoke. He has never used smokeless tobacco.. I have educated him on the risk of diseases from using tobacco products such as cancer, COPD, and heart disease.     I spent 3  minutes counseling the patient.    Advance Care Planning   ACP discussion was held with the patient during this visit. Patient does not have an advance directive, declines further assistance.

## 2023-08-15 NOTE — PROGRESS NOTES
Kkkljjukkllklllllklllllklkljjlkkloo;l;;p[;[[facility] HEMATOLOGY & ONCOLOGY  [address]  [place] [address]  [phone]    Patient Name: Rupa Verma  Encounter Date: 08/21/2023  YOB: 1955  Patient Number: [id number]    REASON FOR CONSULTATION: Lung adenocarcinoma    HISTORY OF PRESENT ILLNESS: Rupa Verma is a 68 yoM whose medical history includes tobacco use/abuse (over 50 pack years-smoking since age 15--up to 1 pack/day), COPD, emphysema, tuberculosis diagnosed approximately 11 years ago fully treated through the health department and HYUN who presented with worsening shortness of breath and cough.    -- 6/5/2023-CT chest wo contrast-comparison with CT chest, 10/7/2022-PET scan 10/18/2022-Interval increase in size of a spiculated nodule in the right upper lobe, currently measuring 1.6 cm, compared with 10 mm on the previous chest CT and PET/CT. This is suspicious for pulmonary neoplasm and tissue diagnosis is recommended. The nodule is relatively peripheral, may possibly be amenable to navigational bronchoscopy and biopsy, versus CT-guided biopsy. No evidence of intrathoracic lymphadenopathy.    --6/26/23- CT chest wo contrast-1.7 cm spiculated RIGHT upper lobe pulmonary nodule, highly suspicious for neoplasm (increase in size from 10/8/2022 when it measured 10 mm).  No evidence of metastatic disease in chest.    --6/26/23- Flexible fiberoptic bronchoscopy Bronchoscopy, Diagnostic, Robotic guidance, Transbronchial biopsy, Endobronchial ultrasound, Transbronchial needle aspirate, and transbronchial needle pulmonary aspirate, and radial probe ultrasound by Dr. Fritz.  Final diagnosis: 1.  Lung, right upper lobe, fine-needle aspiration: Adenocarcinoma. 2.  Lymph node, station 11 R, EBUS: Negative for malignant cells. Polymorphous lymphocyte population. 3.  Lung, bronchial washing: Negative for malignant cells. Benign  bronchial cells, squamous cells, and macrophages.    --7/13/23-consult by CT surgery, Dr. Philippe.  Most recent CT scan of chest, 6/26/2023 and PET scan from 10/18/2022, as well as PFTs and bronchoscopic biopsy findings on 6/26/2023 reviewed.  Preop stage: T1b N0 M0, stage I A2. Recommend surgical resection, his nodule is borderline less than 2 cm, but in some views looks to be about 2.1. Discuss treatment alternatives of stereotactic radiation and wedge resection if his pulmonary reserve is not adequate to tolerate lobectomy. He did not want to consider stereotactic radiation, he would rather have surgery. Plan on full PFT and if those are adequate, proceed with robotic right upper lobectomy with mediastinal lymph node dissection. PFT since clinic visit:  He has hypoxia on room air (O2 Sat 88%) and DLCO of 44%, given this not a candidate for anatomic lobectomy. Will proceed with wedge resection and lymph node dissection.     --7/21/23- 1.  Lung, right upper lobe wedge resection:  A.  Moderately differentiated lung adenocarcinoma, papillary predominate.  B.  Invasive carcinoma measures 1.8 cm in greatest dimension grossly.  C.  Surgical excision margins are negative for evidence of malignancy.  D.  Negative for evidence of pleural invasion.     2.  Lymph node, level 7 biopsy: Benign lymph node tissue.     3.  Lymph node, level 10 biopsy: Benign lymph node tissue.     4.  Lymph node, level 4R biopsy: Benign lymph node tissue.     5.  Lymph node, level 9 biopsy: Benign lymph node tissue.     AJCC STAGE: pT1b, pN0, pMx    Synoptic Checklist   LUNG   8th Edition - Protocol posted: 9/21/2022LUNG: RESECTION - All Specimens  SPECIMEN   Procedure  Wedge resection   Specimen Laterality  Right   TUMOR   Tumor Focality  Single focus   Tumor Site  Upper lobe of lung   Tumor Size     Total Tumor Size (size of entire tumor)  Greatest Dimension (Centimeters): 1.8 cm   Histologic Type  Invasive papillary adenocarcinoma   Histologic  Patterns Present  Papillary     Solid     Micropapillary   Histologic Grade  G2, moderately differentiated   Spread Through Air Spaces (CESAR)  Not identified   Visceral Pleura Invasion  Not identified   Direct Invasion of Adjacent Structures  Not applicable (no adjacent structures present)   Treatment Effect  No known presurgical therapy   Lymphovascular Invasion  Not identified   MARGINS   Margin Status for Invasive Carcinoma  All margins negative for invasive carcinoma   Closest Margin(s) to Invasive Carcinoma  Parenchymal   Distance from Invasive Carcinoma to Closest Margin  0.8 cm   Margin Status for Non-Invasive Tumor  Not applicable   REGIONAL LYMPH NODES   Lymph Node(s) from Prior Procedures  No known prior lymph node sampling performed   Regional Lymph Node Status  All regional lymph nodes negative for tumor   Number of Lymph Nodes Examined  At least: 4   [place] Site(s) Examined  4R: Lower paratracheal     9R: Pulmonary ligament     10R: Hilar     7: Subcarinal   PATHOLOGIC STAGE CLASSIFICATION (pTNM, AJCC 8th Edition)   Reporting of pT, pN, and (when applicable) pM categories is based on information available to the pathologist at the time the report is issued. As per the AJCC (Chapter 1, 8th Ed.) it is the managing physician's responsibility to establish the final pathologic stage based upon all pertinent information, including but potentially not limited to this pathology report.   pT Category  pT1b   pN Category  pN0        --7/23/23-glucose 122, sodium 135, calcium 8.3 otherwise normal BMP with a GFR of 76.9.  Hemoglobin 13.7, hematocrit 44.8, .4, platelets 188,000, WBC 14.47 (no differential).    --Postoperatively the patient is seen for adjuvant management considerations.    I have reviewed the HPI and verified with the patient the accuracy of it. No changes to interval history since the information was documented. Harshal Wells MD 08/21/23      LABS    Lab Results - Last 18 Months   Lab  Units 07/22/23  0415 07/21/23  1112 07/19/23  1123 06/19/23  1304 12/29/22  0838   HEMOGLOBIN g/dL 13.7 15.6 16.8 15.7 17.0   HEMATOCRIT % 44.8 47.9 51.3* 48.7 50.5   MCV fL 100.4* 96.4 96.1 96.1 94   WBC 10*3/mm3 14.47* 10.39 9.75 9.56 10.3   RDW % 13.1 13.0 12.8 13.2 12.7   MPV fL 10.6 10.2 11.0 10.4  --    PLATELETS 10*3/mm3 188 209 235 226 278   IMM GRAN % %  --   --  0.9*  --   --    NEUTROS ABS 10*3/mm3  --   --  7.41*  --  7.6*   LYMPHS ABS 10*3/mm3  --   --  1.22  --  1.3   MONOS ABS 10*3/mm3  --   --  0.79  --  1.0*   EOS ABS 10*3/mm3  --   --  0.16  --  0.2   BASOS ABS 10*3/mm3  --   --  0.08  --  0.1   IMMATURE GRANS (ABS) 10*3/mm3  --   --  0.09*  --   --    NRBC /100 WBC  --   --  0.0  --   --        Lab Results - Last 18 Months   Lab Units 07/23/23  1056 07/22/23  0415 07/21/23  1112 07/19/23  1123 07/14/23  0728 06/19/23  1304 12/29/22  0838   GLUCOSE mg/dL 122* 161* 179* 104*  --  110* 131*   SODIUM mmol/L 135* 137 137 137  --  137 137   SODIUM, ARTERIAL mmol/L  --   --   --   --  139  --   --    POTASSIUM mmol/L 4.5 4.7 4.9 4.4  --  4.2 4.9   CO2 mmol/L 22.0 20.0* 21.0* 24.0  --  24.0 24   CHLORIDE mmol/L 100 100 102 101  --  100 101   ANION GAP mmol/L 13.0 17.0* 14.0 12.0  --  13.0  --    CREATININE mg/dL 1.06 1.41* 1.10 0.72*  --  0.76 0.93   BUN mg/dL 18 20 11 10  --  12 15   BUN / CREAT RATIO  17.0 14.2 10.0 13.9  --  15.8 16   CALCIUM mg/dL 8.3* 8.3* 8.7 9.4  --  9.3 9.5   ALK PHOS U/L  --   --   --  78  --   --  74   TOTAL PROTEIN g/dL  --   --   --  7.2  --   --   --    ALT (SGPT) U/L  --   --   --  45*  --   --  45*   AST (SGOT) U/L  --   --   --  42*  --   --  38   BILIRUBIN mg/dL  --   --   --  0.5  --   --  0.6   ALBUMIN g/dL  --   --   --  4.5  --   --  4.3   GLOBULIN gm/dL  --   --   --  2.7  --   --   --        No results for input(s): MSPIKE, KAPPALAMB, IGLFLC, URICACID, FREEKAPPAL, CEA, LDH, REFLABREPO in the last 65460 hours.    Lab Results - Last 18 Months   Lab Units  12/29/22  0838   T4 TOTAL ug/dL 7.9   TSH uIU/mL 1.150         PAST MEDICAL HISTORY:  ALLERGIES:  No Known Allergies  CURRENT MEDICATIONS:  Outpatient Encounter Medications as of 8/21/2023   Medication Sig Dispense Refill    amLODIPine (NORVASC) 5 MG tablet Take 1 tablet by mouth Daily. 90 tablet 1    aspirin 81 MG tablet Take 1 tablet by mouth Daily.      citalopram (CeleXA) 20 MG tablet Take 1 tablet by mouth Daily. 90 tablet 1    gabapentin (NEURONTIN) 300 MG capsule Take 1 capsule by mouth 3 (Three) Times a Day.      HYDROcodone-acetaminophen (NORCO) 7.5-325 MG per tablet Take 1 tablet by mouth Every 6 (Six) Hours As Needed for Moderate Pain. 48 tablet 0    ibuprofen (ADVIL,MOTRIN) 100 MG/5ML suspension Take 10 mL by mouth Every 6 (Six) Hours As Needed for Mild Pain.      metFORMIN (GLUCOPHAGE) 500 MG tablet Take 1 tablet by mouth Daily With Breakfast. 90 tablet 1    metoprolol tartrate (LOPRESSOR) 50 MG tablet Take 1 tablet by mouth 2 (Two) Times a Day. 180 tablet 1    pantoprazole (PROTONIX) 20 MG EC tablet Take 1 tablet by mouth Daily. 90 tablet 1    simvastatin (ZOCOR) 40 MG tablet Take 1 tablet by mouth Daily. 90 tablet 1    HYDROcodone-acetaminophen (NORCO) 7.5-325 MG per tablet Take 1 tablet by mouth Every 12 (Twelve) Hours As Needed for Moderate Pain.      ibuprofen (ADVIL,MOTRIN) 800 MG tablet Take 1 tablet by mouth Every 6 (Six) Hours As Needed for Mild Pain. Pt states he takes four 200mg tablets in the morning and three 200mg tablets in the evening (Patient not taking: Reported on 8/21/2023)      naloxone (NARCAN) 4 MG/0.1ML nasal spray 1 spray into the nostril(s) as directed by provider As Needed (opioid overdose). For Hydrocodone prescription (Patient not taking: Reported on 8/10/2023)      traMADol (ULTRAM) 50 MG tablet Take 1 tablet by mouth Every 6 (Six) Hours As Needed for Moderate Pain. (Patient not taking: Reported on 8/10/2023) 25 tablet 0     No facility-administered encounter medications on  file as of 8/21/2023.     Adult illnesses:  Arthritis  Chronic back pain  Hypertension  Diabetes mellitus  Hyperlipidemia  History of pulmonary tuberculosis  Hypertension  Left upper lobe nodule/adenocarcinoma  History of nicotine dependence  HYUN    Past surgeries:  Bronchoscopy with Ion robotic assisted, 6/26/2023  Cholecystectomy  Colonoscopy  Endoscopy  Right eye cataract surgery  Patella fracture surgery, left  Right upper lobectomy, 7/21/2023.  Right thoracoscopy with da Margy robot wedge resection, mediastinal lymph node dissection, possible lobectomy-Dr. Philippe    ADULT ILLNESSES:  Patient Active Problem List   Diagnosis Code    Lumbar back pain M54.50    Gastroesophageal reflux disease without esophagitis K21.9    Essential hypertension I10    Hyperlipidemia LDL goal <100 E78.5    Left upper lobe pulmonary nodule R91.1    History of primary tuberculosis Z86.11    Personal history of nicotine dependence Z87.891    Panlobular emphysema J43.1    Cervical paraspinal muscle spasm M62.838    Annual physical exam Z00.00    Obstructive sleep apnea G47.33    Hilar adenopathy R59.0    Adenocarcinoma, lung, right C34.91    Lung cancer C34.90    Tobacco use Z72.0     SURGERIES:  Past Surgical History:   Procedure Laterality Date    BRONCHOSCOPY WITH ION ROBOTIC ASSIST N/A 6/26/2023    Procedure: BRONCHOSCOPY WITH ION ROBOT;  Surgeon: Rj Fritz MD;  Location:  [facility] OR;  Service: Robotics - Pulmonary;  Laterality: N/A;  pre lung mass   post lung mass   Carol Ann Sapp    CHOLECYSTECTOMY      COLONOSCOPY      ENDOSCOPY      EYE SURGERY Right     cataract surgery    LOBECTOMY Right 7/21/2023    Procedure: RIGHT THORACOSCOPY WITH DAVINCI ROBOT WITH WEDGE RESECTION, POSSIBLE LOBECTOMY, MEDIASTINAL LYMPH NODE DISSECTION, POSSIBLE LOBECTOMY;  Surgeon: Shaggy Philippe MD;  Location:  [facility] OR;  Service: Robotics - DaVinci;  Laterality: Right;    PATELLA FRACTURE SURGERY Left      HEALTH MAINTENANCE ITEMS:  Health  "Maintenance Due   Topic Date Due    URINE MICROALBUMIN  Never done    COVID-19 Vaccine (1) Never done    DIABETIC FOOT EXAM  Never done    DIABETIC EYE EXAM  Never done       <no information>  Last Completed Colonoscopy            COLORECTAL CANCER SCREENING (COLONOSCOPY - Every 10 Years) Next due on 5/3/2026      05/03/2016  COLONOSCOPY (Done)                  Immunization History   Administered Date(s) Administered    Fluzone Quad >6mos (Multi-dose) 10/10/2012    Influenza Quad Vaccine (Inpatient) 10/10/2012     Last Completed Mammogram       This patient has no relevant Health Maintenance data.              FAMILY HISTORY:  Family History   Problem Relation Age of Onset    Hypertension Mother     No Known Problems Father     Diabetes Neg Hx     Cancer Neg Hx     Stroke Neg Hx      SOCIAL HISTORY:  Social History     Socioeconomic History    Marital status:    Tobacco Use    Smoking status: Every Day     Packs/day: 1.00     Years: 53.00     Pack years: 53.00     Types: Cigarettes     Start date: 1970     Passive exposure: Never    Smokeless tobacco: Never   Vaping Use    Vaping Use: Never used   Substance and Sexual Activity    Alcohol use: Not Currently     Alcohol/week: 42.0 - 56.0 standard drinks     Types: 42 - 56 Cans of beer per week     Comment: 6-8 beers per day    Drug use: No    Sexual activity: Not Currently       REVIEW OF SYSTEMS:  Review of Systems   Constitutional: Negative.  Negative for fatigue and unexpected weight loss.        Manages his ADLs, to include chores, errands and driving.  He is up and about, \"most of the time.\"   HENT:  Positive for postnasal drip.    Eyes: Negative.    Respiratory:  Positive for shortness of breath (Baseline exertional dyspnea but denies sob with routine activities). Negative for cough (\"Just a little.\"  Dry).         Current smoker-age 15-present (1 pack/day).  No plans to stop.  \"I'm just being honest doc, I just don't want to.\"   Cardiovascular: " "Negative.    Gastrointestinal: Negative.    Endocrine: Negative.    Genitourinary: Negative.    Musculoskeletal:  Positive for arthralgias (chronic), back pain (chronic) and myalgias (Intermittent nocturnal leg cramps and with ambulation). Negative for neck pain.   Skin: Negative.    Allergic/Immunologic: Negative.    Neurological: Negative.    Hematological: Negative.    Psychiatric/Behavioral: Negative.       /76   Pulse 75   Temp 97.9 øF (36.6 øC) (Temporal)   Resp 18   Ht 188 cm (74\")   Wt 106 kg (232 lb 9.6 oz)   SpO2 95%   BMI 29.86 kg/mý  Body surface area is 2.32 meters squared.  Pain Score    08/21/23 1132   PainSc:   6   PainLoc: Rib Cage       Physical Exam:  Physical Exam  Vitals and nursing note reviewed.   Constitutional:       Appearance: He is obese.      Comments: Pleasant, cooperative, modestly kept, obese elderly male.  Ambulatory.  ECOG 0.  He is here alone.   HENT:      Head: Normocephalic and atraumatic.      Mouth/Throat:      Mouth: Mucous membranes are moist.      Pharynx: Oropharynx is clear.      Comments: Poor dentition with multiple missing teeth  Eyes:      General: No scleral icterus.     Extraocular Movements: Extraocular movements intact.      Pupils: Pupils are equal, round, and reactive to light.   Cardiovascular:      Rate and Rhythm: Normal rate.   Pulmonary:      Effort: Pulmonary effort is normal.      Comments: Distant breath sounds bilaterally  Abdominal:      Palpations: Abdomen is soft.      Tenderness: There is no abdominal tenderness.   Musculoskeletal:         General: Normal range of motion.      Cervical back: Normal range of motion.   Lymphadenopathy:      Cervical: No cervical adenopathy.   Skin:     General: Skin is warm.   Neurological:      General: No focal deficit present.      Mental Status: He is alert and oriented to person, place, and time.   Psychiatric:         Mood and Affect: Mood normal.         Behavior: Behavior normal.         Thought " Content: Thought content normal.         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 This is suspicious for pulmonary neoplasm and tissue diagnosis is recommended. The nodule is relatively peripheral, may possibly be amenable to navigational bronchoscopy and biopsy, versus CT-guided biopsy. No evidence of intrathoracic lymphadenopathy.    -- Tumor status:  -- 7/21/23- robotic RUL wedge resection, MLND Final diagnosis:  1.8 cm tumor right upper lobe, all regional nodes negative for malignancy (number of nodes: At least 4; 4R lower paratracheal, 9R pulmonary ligament, 10 R, hilar, 7 subcarinal).  Surgical margins: All margins negative for invasive carcinoma .  --7/31/23- PD-L1- High expression (TPS- 95%)  --8/2/23 - EGFR mutation - NOT detected  --8/3/23 - BRAF mutation -  Detected low level; ALK/ROS1/MET/RET (FISH) - negative    2.  Emphysema  3.  Nicotine dependence.  Continues to smoke.  1 pack/day since age 15 (>50 pack year smoker)  4.  History of TB  5.  Diabetes  6.  Beer use/excess- 6-8 cans/day since age 22  7.  Mild macrocytic anemia.  Contribution from malignancy/anemia of chronic disease    Plan:  Review available diagnostic information including preoperative imaging (CT scans, PET scan), bronchoscopic biopsy, pathology from RUL wedge resection and MLND (above) on 7/21/23 and postop labs including mild microcytic anemia otherwise stable CBC.  Note the GFR>60  Draw CBC with differential, CMP, iron, iron saturation, ferritin, B12, CEA  Review and discuss the NCCN guidelines version 3.2023 non-small cell lung cancer after definitive surgical  "intervention: Stage IA with negative margins-adjuvant treatment: Observation.  Stage IB (T2a, M0)-margins negative (R0)-observe or chemotherapy for high risk patients (poorly differentiated tumors/neuroendocrine tumors, vascular invasion, wedge resection, tumors> 4 cm, visceral pleural involvement, and unknown lymph node status).  Surveillance: Stage 1-2 (primary treatment included surgery+/-chemotherapy): H&P and chest CT+/-contrast every 6 months for 2-3 years then H&P and low-dose noncontrast enhanced chest CT annually.  4.   Given his continued smoking, and wedge resection we discussed the rationale for adjuvant systemic therapy (chemotherapy) at some length.  Potential toxicities (to include myelosuppression and risk of infection, etc.) also discussed.  Ultimately the patient declined systemic therapy stating that, \"I am not quitting smoking anyway.\"  Prefers active surveillance and, \"we will deal with it if it ever comes back.\"  5.   Schedule CT chest with IV contrast in [facility] weeks at [facility].  Compare to previous studies.  6.   Return to office in 12 weeks preoffice CBC and diff, CMP, CEA, iron, fe sat, ferritin, b12, folate.       I spent 112 minutes caring for Rupa on this date of service. This time includes time spent by me in the following activities: preparing for the visit, reviewing tests, performing a medically appropriate examination and/or evaluation, counseling and educating the patient/family/caregiver, ordering medications, tests, or procedures and documenting information in the medical record.         "

## 2023-08-21 ENCOUNTER — CONSULT (OUTPATIENT)
Dept: ONCOLOGY | Facility: CLINIC | Age: 68
End: 2023-08-21
Payer: MEDICARE

## 2023-08-21 ENCOUNTER — LAB (OUTPATIENT)
Dept: LAB | Facility: HOSPITAL | Age: 68
End: 2023-08-21
Payer: MEDICARE

## 2023-08-21 VITALS
SYSTOLIC BLOOD PRESSURE: 150 MMHG | RESPIRATION RATE: 18 BRPM | TEMPERATURE: 97.9 F | WEIGHT: 232.6 LBS | HEIGHT: 74 IN | OXYGEN SATURATION: 95 % | BODY MASS INDEX: 29.85 KG/M2 | HEART RATE: 75 BPM | DIASTOLIC BLOOD PRESSURE: 76 MMHG

## 2023-08-21 DIAGNOSIS — C34.91 ADENOCARCINOMA, LUNG, RIGHT: Primary | ICD-10-CM

## 2023-08-21 DIAGNOSIS — C34.91 ADENOCARCINOMA, LUNG, RIGHT: ICD-10-CM

## 2023-08-21 LAB
ALBUMIN SERPL-MCNC: 4.5 G/DL (ref 3.5–5.2)
ALBUMIN/GLOB SERPL: 1.6 G/DL
ALP SERPL-CCNC: 88 U/L (ref 39–117)
ALT SERPL W P-5'-P-CCNC: 41 U/L (ref 1–41)
ANION GAP SERPL CALCULATED.3IONS-SCNC: 11 MMOL/L (ref 5–15)
AST SERPL-CCNC: 40 U/L (ref 1–40)
BASOPHILS # BLD AUTO: 0.11 10*3/MM3 (ref 0–0.2)
BASOPHILS NFR BLD AUTO: 1 % (ref 0–1.5)
BILIRUB SERPL-MCNC: 0.5 MG/DL (ref 0–1.2)
BUN SERPL-MCNC: 10 MG/DL (ref 8–23)
BUN/CREAT SERPL: 13.9 (ref 7–25)
CALCIUM SPEC-SCNC: 9.6 MG/DL (ref 8.6–10.5)
CHLORIDE SERPL-SCNC: 102 MMOL/L (ref 98–107)
CO2 SERPL-SCNC: 25 MMOL/L (ref 22–29)
CREAT SERPL-MCNC: 0.72 MG/DL (ref 0.76–1.27)
DEPRECATED RDW RBC AUTO: 45.1 FL (ref 37–54)
EGFRCR SERPLBLD CKD-EPI 2021: 100.1 ML/MIN/1.73
EOSINOPHIL # BLD AUTO: 0.21 10*3/MM3 (ref 0–0.4)
EOSINOPHIL NFR BLD AUTO: 1.9 % (ref 0.3–6.2)
ERYTHROCYTE [DISTWIDTH] IN BLOOD BY AUTOMATED COUNT: 13.2 % (ref 12.3–15.4)
FERRITIN SERPL-MCNC: 309.2 NG/ML (ref 30–400)
FOLATE SERPL-MCNC: 9.95 NG/ML (ref 4.78–24.2)
GLOBULIN UR ELPH-MCNC: 2.8 GM/DL
GLUCOSE SERPL-MCNC: 121 MG/DL (ref 65–99)
HCT VFR BLD AUTO: 46.9 % (ref 37.5–51)
HGB BLD-MCNC: 15.4 G/DL (ref 13–17.7)
IMM GRANULOCYTES # BLD AUTO: 0.08 10*3/MM3 (ref 0–0.05)
IMM GRANULOCYTES NFR BLD AUTO: 0.7 % (ref 0–0.5)
IRON 24H UR-MRATE: 83 MCG/DL (ref 59–158)
IRON SATN MFR SERPL: 23 % (ref 20–50)
LYMPHOCYTES # BLD AUTO: 1.53 10*3/MM3 (ref 0.7–3.1)
LYMPHOCYTES NFR BLD AUTO: 13.8 % (ref 19.6–45.3)
MCH RBC QN AUTO: 30.7 PG (ref 26.6–33)
MCHC RBC AUTO-ENTMCNC: 32.8 G/DL (ref 31.5–35.7)
MCV RBC AUTO: 93.6 FL (ref 79–97)
MONOCYTES # BLD AUTO: 0.88 10*3/MM3 (ref 0.1–0.9)
MONOCYTES NFR BLD AUTO: 7.9 % (ref 5–12)
NEUTROPHILS NFR BLD AUTO: 74.7 % (ref 42.7–76)
NEUTROPHILS NFR BLD AUTO: 8.29 10*3/MM3 (ref 1.7–7)
NRBC BLD AUTO-RTO: 0 /100 WBC (ref 0–0.2)
PLATELET # BLD AUTO: 245 10*3/MM3 (ref 140–450)
PMV BLD AUTO: 10.1 FL (ref 6–12)
POTASSIUM SERPL-SCNC: 4.6 MMOL/L (ref 3.5–5.2)
PROT SERPL-MCNC: 7.3 G/DL (ref 6–8.5)
RBC # BLD AUTO: 5.01 10*6/MM3 (ref 4.14–5.8)
SODIUM SERPL-SCNC: 138 MMOL/L (ref 136–145)
TIBC SERPL-MCNC: 361 MCG/DL (ref 298–536)
TRANSFERRIN SERPL-MCNC: 242 MG/DL (ref 200–360)
VIT B12 BLD-MCNC: 447 PG/ML (ref 211–946)
WBC NRBC COR # BLD: 11.1 10*3/MM3 (ref 3.4–10.8)

## 2023-08-21 PROCEDURE — 85025 COMPLETE CBC W/AUTO DIFF WBC: CPT

## 2023-08-21 PROCEDURE — 82607 VITAMIN B-12: CPT

## 2023-08-21 PROCEDURE — 82746 ASSAY OF FOLIC ACID SERUM: CPT

## 2023-08-21 PROCEDURE — 36415 COLL VENOUS BLD VENIPUNCTURE: CPT

## 2023-08-21 PROCEDURE — 83540 ASSAY OF IRON: CPT

## 2023-08-21 PROCEDURE — 82728 ASSAY OF FERRITIN: CPT

## 2023-08-21 PROCEDURE — 80053 COMPREHEN METABOLIC PANEL: CPT

## 2023-08-21 PROCEDURE — 84466 ASSAY OF TRANSFERRIN: CPT

## 2023-08-24 ENCOUNTER — PATIENT ROUNDING (BHMG ONLY) (OUTPATIENT)
Dept: ONCOLOGY | Facility: CLINIC | Age: 68
End: 2023-08-24
Payer: MEDICARE

## 2023-08-24 NOTE — PROGRESS NOTES
August 24, 2023    Hello, may I speak with Rupa Verma?    My name is TODD      I am  with [facility] HEMATOLOGY & ONCOLOGY  [address]  [place] [address] [phone].    Before we get started may I verify your date of birth? 1955    I am calling to officially welcome you to our practice and ask about your recent visit. Is this a good time to talk? yes    Tell me about your visit with us. What things went well?  VISIT WENT WELL       We're always looking for ways to make our patients' experiences even better. Do you have recommendations on ways we may improve?  no    Overall were you satisfied with your first visit to our practice? yes       I appreciate you taking the time to speak with me today. Is there anything else I can do for you? no      Thank you, and have a great day.

## 2023-08-29 ENCOUNTER — HOSPITAL ENCOUNTER (OUTPATIENT)
Dept: CT IMAGING | Facility: HOSPITAL | Age: 68
Discharge: HOME | End: 2023-08-29
Admitting: INTERNAL MEDICINE
Payer: MEDICARE

## 2023-08-29 DIAGNOSIS — C34.91 ADENOCARCINOMA, LUNG, RIGHT: ICD-10-CM

## 2023-08-29 PROCEDURE — 25510000001 IOPAMIDOL 61 % SOLUTION: Performed by: INTERNAL MEDICINE

## 2023-08-29 PROCEDURE — 71260 CT THORAX DX C+: CPT

## 2023-08-29 RX ADMIN — IOPAMIDOL 100 ML: 612 INJECTION, SOLUTION INTRAVENOUS at 14:30

## 2023-09-26 DIAGNOSIS — D51.9 ANEMIA DUE TO VITAMIN B12 DEFICIENCY, UNSPECIFIED B12 DEFICIENCY TYPE: Primary | ICD-10-CM

## 2023-09-26 RX ORDER — CYANOCOBALAMIN 1000 UG/ML
1000 INJECTION, SOLUTION INTRAMUSCULAR; SUBCUTANEOUS
Status: SHIPPED | OUTPATIENT
Start: 2023-09-26

## 2023-09-29 ENCOUNTER — CLINICAL SUPPORT (OUTPATIENT)
Dept: FAMILY MEDICINE CLINIC | Facility: CLINIC | Age: 68
End: 2023-09-29
Payer: MEDICARE

## 2023-09-29 RX ADMIN — CYANOCOBALAMIN 1000 MCG: 1000 INJECTION, SOLUTION INTRAMUSCULAR; SUBCUTANEOUS at 08:32

## 2023-09-29 NOTE — PROGRESS NOTES
Pt presented in office today in order to receive his b12 injection, injection was given in the left deltoid per pt request, pt tolerated well

## 2023-10-10 ENCOUNTER — HOSPITAL ENCOUNTER (OUTPATIENT)
Dept: PAIN MANAGEMENT | Age: 68
Discharge: HOME | End: 2023-10-10
Payer: MEDICARE

## 2023-10-10 ENCOUNTER — HOSPITAL ENCOUNTER (OUTPATIENT)
Dept: GENERAL RADIOLOGY | Age: 68
Discharge: HOME | End: 2023-10-10
Payer: MEDICARE

## 2023-10-10 VITALS
RESPIRATION RATE: 18 BRPM | BODY MASS INDEX: 30.03 KG/M2 | SYSTOLIC BLOOD PRESSURE: 153 MMHG | HEART RATE: 75 BPM | WEIGHT: 234 LBS | TEMPERATURE: 97.6 F | DIASTOLIC BLOOD PRESSURE: 75 MMHG | OXYGEN SATURATION: 94 % | HEIGHT: 74 IN

## 2023-10-10 DIAGNOSIS — M54.50 CHRONIC MIDLINE LOW BACK PAIN WITHOUT SCIATICA: ICD-10-CM

## 2023-10-10 DIAGNOSIS — G89.29 CHRONIC MIDLINE LOW BACK PAIN WITHOUT SCIATICA: ICD-10-CM

## 2023-10-10 DIAGNOSIS — M70.61 TROCHANTERIC BURSITIS OF RIGHT HIP: ICD-10-CM

## 2023-10-10 DIAGNOSIS — G89.29 CHRONIC BILATERAL LOW BACK PAIN WITHOUT SCIATICA: Primary | ICD-10-CM

## 2023-10-10 DIAGNOSIS — G89.29 CHRONIC MIDLINE LOW BACK PAIN WITHOUT SCIATICA: Primary | ICD-10-CM

## 2023-10-10 DIAGNOSIS — M53.3 SI (SACROILIAC) JOINT DYSFUNCTION: ICD-10-CM

## 2023-10-10 DIAGNOSIS — M54.50 CHRONIC MIDLINE LOW BACK PAIN WITHOUT SCIATICA: Primary | ICD-10-CM

## 2023-10-10 DIAGNOSIS — Z02.89 PAIN MEDICATION AGREEMENT SIGNED: ICD-10-CM

## 2023-10-10 DIAGNOSIS — M54.50 CHRONIC BILATERAL LOW BACK PAIN WITHOUT SCIATICA: ICD-10-CM

## 2023-10-10 DIAGNOSIS — M54.50 CHRONIC BILATERAL LOW BACK PAIN WITHOUT SCIATICA: Primary | ICD-10-CM

## 2023-10-10 DIAGNOSIS — G89.29 CHRONIC BILATERAL LOW BACK PAIN WITHOUT SCIATICA: ICD-10-CM

## 2023-10-10 PROCEDURE — 99215 OFFICE O/P EST HI 40 MIN: CPT

## 2023-10-10 PROCEDURE — 73521 X-RAY EXAM HIPS BI 2 VIEWS: CPT

## 2023-10-10 PROCEDURE — 72100 X-RAY EXAM L-S SPINE 2/3 VWS: CPT

## 2023-10-10 RX ORDER — ASPIRIN 81 MG/1
TABLET, CHEWABLE ORAL
COMMUNITY

## 2023-10-10 RX ORDER — CYANOCOBALAMIN 1000 UG/ML
1000 INJECTION, SOLUTION INTRAMUSCULAR; SUBCUTANEOUS
COMMUNITY
Start: 2023-09-26

## 2023-10-10 RX ORDER — CITALOPRAM 20 MG/1
1 TABLET ORAL
COMMUNITY

## 2023-10-10 RX ORDER — AMLODIPINE BESYLATE 5 MG/1
5 TABLET ORAL DAILY
COMMUNITY
Start: 2023-06-30

## 2023-10-10 RX ORDER — CELECOXIB 100 MG/1
100 CAPSULE ORAL 2 TIMES DAILY
Qty: 60 CAPSULE | Refills: 2 | Status: SHIPPED | OUTPATIENT
Start: 2023-10-10 | End: 2023-10-12 | Stop reason: SDUPTHER

## 2023-10-10 RX ORDER — CITALOPRAM 20 MG/1
TABLET ORAL
COMMUNITY
Start: 2023-06-30

## 2023-10-10 RX ORDER — HYDROCODONE BITARTRATE AND ACETAMINOPHEN 7.5; 325 MG/1; MG/1
TABLET ORAL
COMMUNITY
Start: 2023-07-07 | End: 2023-10-10 | Stop reason: SDUPTHER

## 2023-10-10 RX ORDER — PANTOPRAZOLE SODIUM 20 MG/1
1 TABLET, DELAYED RELEASE ORAL
COMMUNITY
Start: 2020-07-17

## 2023-10-10 RX ORDER — SIMVASTATIN 40 MG
40 TABLET ORAL DAILY
COMMUNITY
Start: 2023-06-30

## 2023-10-10 RX ORDER — SIMVASTATIN 20 MG
1 TABLET ORAL DAILY
COMMUNITY

## 2023-10-10 RX ORDER — SIMVASTATIN 20 MG/5ML
SUSPENSION ORAL
COMMUNITY

## 2023-10-10 RX ORDER — HYDROCODONE BITARTRATE AND ACETAMINOPHEN 7.5; 325 MG/1; MG/1
1 TABLET ORAL 2 TIMES DAILY
Qty: 60 TABLET | Refills: 0 | Status: SHIPPED | OUTPATIENT
Start: 2023-10-10 | End: 2023-10-12 | Stop reason: SDUPTHER

## 2023-10-10 RX ORDER — HYDROCODONE BITARTRATE AND ACETAMINOPHEN 7.5; 325 MG/1; MG/1
1 TABLET ORAL 2 TIMES DAILY
Qty: 60 TABLET | Refills: 0 | Status: SHIPPED | OUTPATIENT
Start: 2023-10-10 | End: 2023-10-10 | Stop reason: CLARIF

## 2023-10-10 RX ORDER — PANTOPRAZOLE SODIUM 20 MG/1
20 TABLET, DELAYED RELEASE ORAL DAILY
COMMUNITY
Start: 2023-06-30

## 2023-10-10 RX ORDER — GABAPENTIN 300 MG/1
300 CAPSULE ORAL 3 TIMES DAILY
COMMUNITY

## 2023-10-10 ASSESSMENT — PAIN DESCRIPTION - ONSET: ONSET: ON-GOING

## 2023-10-10 ASSESSMENT — PAIN - FUNCTIONAL ASSESSMENT: PAIN_FUNCTIONAL_ASSESSMENT: PREVENTS OR INTERFERES SOME ACTIVE ACTIVITIES AND ADLS

## 2023-10-10 ASSESSMENT — PAIN DESCRIPTION - FREQUENCY: FREQUENCY: CONTINUOUS

## 2023-10-10 ASSESSMENT — PAIN DESCRIPTION - DESCRIPTORS: DESCRIPTORS: ACHING;NUMBNESS

## 2023-10-10 ASSESSMENT — PAIN DESCRIPTION - ORIENTATION: ORIENTATION: RIGHT

## 2023-10-10 ASSESSMENT — PAIN SCALES - GENERAL: PAINLEVEL_OUTOF10: 6

## 2023-10-10 ASSESSMENT — PAIN DESCRIPTION - PAIN TYPE: TYPE: CHRONIC PAIN

## 2023-10-10 ASSESSMENT — PAIN DESCRIPTION - LOCATION: LOCATION: BACK;LEG;HIP

## 2023-10-10 NOTE — DISCHARGE INSTRUCTIONS
Treatment: The treatment goal of pain management is to find the cause behind the pain and treat the causes. To find the cause, a plan is developed which could included, but not limited to, diagnostic testing, imaging, physical therapy, possibly injections. Once the cause has been determined, a treatment plan is developed to treat the source of the pain. This could include, but not limited to, chiropractics, physical therapy, massage therapy, dry needling, medical equipment, cognitive behavioral therapy, injections, surgical referrals, and medications. The plan is to treat what is causing the pain with conservative alternate treatments. Masking the pain with opioids and other medications is not an effective treatment but a temporary fix. Opioids are not given to every patient in pain management. Actually, opioids are avoided if possible. If opioid medications were given, the goal is to use the least amount of opioids as possible while tailoring a more effective treatment plan to treat the cause of your pain which will help reduce and possibly eliminate your pain. If opioids were given the goal is to wean medication as soon as appropriate. If your pain is proven to be chronic pain that continuous around the clock pain medication is required, the use of abuse deterrent medication will be used as directed by the opioids drug task force. Exercise - Relying solely on medications and injections will not alleviate your pain or keep your pain at a tolerable level. Core strengthening coupled with a generalized exercise program has been proven to help function and reduce pain. Therefore, following a regular generalized exercise program is vital in helping to reduce your pain. Please follow these exercise instructions as given in your discharge instructions.      Mariaelena Meyers, [...], and Claire Mccoy; (2012) A [place]-Analysis of Core Stability Exercise versus General Exercise for Chronic Low Back Pain; PloS

## 2023-10-10 NOTE — PROGRESS NOTES
Clinic Documentation      Education Provided:  [x] Review of Parveen Mccabe  [x] Agreement Review  [x] PEG Score Calculated [x] PHQ Score Calculated [x] ORT Score Calculated    [x] Compliance Issues Discussed [x] Cognitive Behavior Needs [x] Exercise [] Review of Test [] Financial Issues  [x] Tobacco/Alcohol Use Reviewed [x] Teaching [x] New Patient [] Picture Obtained    Physician Plan:  [] Outgoing Referral  [] Pharmacy Consult  [x] Test Ordered [x] Prescription Ordered/Changed   [] Obtained Test Results / Consult Notes        Complete if patient is withholding blood thinner for procedure     Blood Thinner Patient is currently taking:      [] Plavix (Hold for 7 days)  [] Aspirin (Hold for 5 days)     [] Pletal (Hold for 2 days)  [] Pradaxa (Hold for 3 days)    [] Effient (Hold for 7 days)  [] Xarelto (Hold for 2 days)    [] Eliquis (Hold for 2 days)  [] Brilinta (Hold for 7 days)    [] Coumadin (Hold for 5 days) - (INR needs to be drawn the day prior to procedure- INR < 2.0)    [] Aggrenox (Hold for 7 days)        [] Patient will stop medication on their own.    [] Blood Thinner Form Faxed for approval to hold.    Provider form faxed to:     Assessment Completed by:  Electronically signed by Ilene Hernandez RN on 10/10/2023 at 8:17 AM

## 2023-10-10 NOTE — ADDENDUM NOTE
Encounter addended by: Lori Clark PA-C on: 10/10/2023 10:08 AM   Actions taken: Pend clinical note, SmartForm saved

## 2023-10-10 NOTE — H&P
[facility]    History and Physical    Patient Name: Leela Norton    MR #: [id number]    Account [de-identified]    : 1955    Age: 80 y.o. Sex: male    Date: 10/10/23    PCP: BRIAN Rueda CNP    Referring Provider:    Chief Complaint:   Chief Complaint   Patient presents with    New Patient     Low back pain       History of Present Illness: The patient is a 80 y.o. male who was referrred by orthopedic [place] pain management for chronic back and hip pain. He has had a LESI in the past which he reports gave him no relief. He has had a right trochanteric bursa injection on 2023 which did relieve his pain 100% about 2 weeks with about 50% pain relief thereafter. He has adenocarcinoma right lung. He is being followed by Dr. Hetal Ponce and Dr. Marcianne Dandy. He underwent a right upper lobe wedge resection. He declined chemotherapy and stereotactic radiation. He is a smoker. Patient has back pain started several years ago, Caused by fall several times while working, Quality: aching, Behavior:  intermittent, Worsens with: standing and walking, Improves with: medication    Patient rates pain 6    Patient has pain that radiates down from the low back to the without radiation, to the right knee   Patient has more pain getting up from a seated position than the act of setting down. No   Patient has 0% of pain in the back and 100% in the leg(s). Patient leans on shopping cart or leans forward when sitting in a chair to relieve low back pain. Yes   Patient has to sleep in a recliner or in an inclined position due to pain. No   Pain worsens going up and downstairs. Yes Patient has to take one step at a time due to pain. No   Patient is a side sleeper. Yes  Patient tosses and turns at night due to pain. No Patient sleeps with a pillow between his/her knees to help lessen pain. No Patient has pain that radiates into the groin.  No    Patient has to use

## 2023-10-10 NOTE — H&P
[facility]    History and Physical    Patient Name: Julio Dos Santos    MR #: [id number]    Account [de-identified]    : 1955    Age: 80 y.o. Sex: male    Date: 10/10/23    PCP: Link Goodpasture, APRN - CNP    Referring Provider:    Chief Complaint:   Chief Complaint   Patient presents with    New Patient     Low back pain       History of Present Illness: The patient is a 80 y.o. male who was referrred by orthopedic [facility] pain management for chronic back and hip pain. He has had a LESI in the past which he reports gave him no relief. He has had a right trochanteric bursa injection on 2023 which did relieve his pain 100% about 2 weeks with about 50% pain relief thereafter. He has adenocarcinoma right lung. He is being followed by Dr. Henrry Valerio and Dr. Tiara Martin. He underwent a right upper lobe wedge resection. He declined chemotherapy and stereotactic radiation. He is a smoker. Patient has back pain started several years ago, Caused by fall several times while working, Quality: aching, Behavior:  intermittent, Worsens with: standing and walking, Improves with: medication    Patient rates pain 6    Patient has pain that radiates down from the low back to the without radiation, to the right knee   Patient has more pain getting up from a seated position than the act of setting down. No   Patient has 0% of pain in the back and 100% in the leg(s). Patient leans on shopping cart or leans forward when sitting in a chair to relieve low back pain. Yes   Patient has to sleep in a recliner or in an inclined position due to pain. No   Pain worsens going up and downstairs. Yes Patient has to take one step at a time due to pain. No   Patient is a side sleeper. Yes  Patient tosses and turns at night due to pain. No Patient sleeps with a pillow between his/her knees to help lessen pain. No Patient has pain that radiates into the groin.  No    Patient has to use

## 2023-10-10 NOTE — TELEPHONE ENCOUNTER
1. Chronic midline low back pain without sciatica    2. Pain medication agreement signed    3. Trochanteric bursitis of right hip  - celecoxib (CELEBREX) 100 MG capsule; Take 1 capsule by mouth 2 times daily  Dispense: 60 capsule; Refill: 2    4. SI (sacroiliac) joint dysfunction  - celecoxib (CELEBREX) 100 MG capsule; Take 1 capsule by mouth 2 times daily  Dispense: 60 capsule; Refill: 2      Requested Prescriptions     Pending Prescriptions Disp Refills    HYDROcodone-acetaminophen (NORCO) 7.5-325 MG per tablet 60 tablet 0     Sig: Take 1 tablet by mouth 2 times daily for 30 days.  Max Daily Amount: 2 tablets     Signed Prescriptions Disp Refills    celecoxib (CELEBREX) 100 MG capsule 60 capsule 2     Sig: Take 1 capsule by mouth 2 times daily     Authorizing Provider: Danette Cuadra       Continue medication with refill sent at appointment yes; refill sent to medical director at appointment no, see refill encounter dated 10/10/2023    Electronically signed by Danette Cuadra PA-C on 10/10/2023 at 9:40 AM

## 2023-10-12 ENCOUNTER — TELEPHONE (OUTPATIENT)
Dept: PAIN MANAGEMENT | Age: 68
End: 2023-10-12

## 2023-10-12 RX ORDER — HYDROCODONE BITARTRATE AND ACETAMINOPHEN 7.5; 325 MG/1; MG/1
1 TABLET ORAL 2 TIMES DAILY
Qty: 60 TABLET | Refills: 0 | Status: SHIPPED | OUTPATIENT
Start: 2023-10-12 | End: 2023-11-11

## 2023-10-12 RX ORDER — CELECOXIB 100 MG/1
100 CAPSULE ORAL 2 TIMES DAILY
Qty: 60 CAPSULE | Refills: 2 | Status: SHIPPED | OUTPATIENT
Start: 2023-10-12

## 2023-10-16 ENCOUNTER — TELEPHONE (OUTPATIENT)
Dept: PAIN MANAGEMENT | Age: 68
End: 2023-10-16

## 2023-10-16 NOTE — TELEPHONE ENCOUNTER
Call placed to patient to inform him of x ray results. Patient's wife answered the phone and took the message. She states understanding.

## 2023-10-25 ENCOUNTER — HOSPITAL ENCOUNTER (OUTPATIENT)
Dept: PAIN MANAGEMENT | Age: 68
Discharge: HOME | End: 2023-10-25
Payer: MEDICARE

## 2023-10-25 VITALS
OXYGEN SATURATION: 97 % | DIASTOLIC BLOOD PRESSURE: 72 MMHG | TEMPERATURE: 97.4 F | RESPIRATION RATE: 18 BRPM | HEART RATE: 72 BPM | SYSTOLIC BLOOD PRESSURE: 151 MMHG

## 2023-10-25 DIAGNOSIS — M53.3 PAIN OF RIGHT SACROILIAC JOINT: ICD-10-CM

## 2023-10-25 DIAGNOSIS — M70.61 TROCHANTERIC BURSITIS OF RIGHT HIP: ICD-10-CM

## 2023-10-25 PROCEDURE — 20611 DRAIN/INJ JOINT/BURSA W/US: CPT

## 2023-10-25 PROCEDURE — 20552 NJX 1/MLT TRIGGER POINT 1/2: CPT | Performed by: NURSE PRACTITIONER

## 2023-10-25 PROCEDURE — 6360000002 HC RX W HCPCS

## 2023-10-25 PROCEDURE — 2500000003 HC RX 250 WO HCPCS

## 2023-10-25 PROCEDURE — 20611 DRAIN/INJ JOINT/BURSA W/US: CPT | Performed by: NURSE PRACTITIONER

## 2023-10-25 RX ORDER — TRIAMCINOLONE ACETONIDE 40 MG/ML
40 INJECTION, SUSPENSION INTRA-ARTICULAR; INTRAMUSCULAR ONCE
Status: DISCONTINUED | OUTPATIENT
Start: 2023-10-25 | End: 2023-10-27 | Stop reason: HOSPADM

## 2023-10-25 RX ORDER — LIDOCAINE HYDROCHLORIDE 10 MG/ML
1 INJECTION, SOLUTION EPIDURAL; INFILTRATION; INTRACAUDAL; PERINEURAL ONCE
Status: DISCONTINUED | OUTPATIENT
Start: 2023-10-25 | End: 2023-10-27 | Stop reason: HOSPADM

## 2023-10-25 RX ORDER — ETHYL CHLORIDE 100 %
AEROSOL, SPRAY (ML) TOPICAL PRN
Status: DISCONTINUED | OUTPATIENT
Start: 2023-10-25 | End: 2023-10-27 | Stop reason: HOSPADM

## 2023-10-25 RX ORDER — BUPIVACAINE HYDROCHLORIDE 5 MG/ML
1 INJECTION, SOLUTION EPIDURAL; INTRACAUDAL ONCE
Status: DISCONTINUED | OUTPATIENT
Start: 2023-10-25 | End: 2023-10-27 | Stop reason: HOSPADM

## 2023-10-25 NOTE — PROGRESS NOTES
Anesthesia Pre Eval Note    Anesthesia ROS/Med Hx        Anesthetic Complication History:  Patient does not have a history of anesthetic complications      Pulmonary Review:  Patient does not have a pulmonary history      Neuro/Psych Review:  Patient does not have a neuro/psych history       Cardiovascular Review:  Exercise tolerance: good (>4 METS)  Positive for hypertension    GI/HEPATIC/RENAL Review:  Patient does not have a GI/hepatic/renalhistory       End/Other Review:    Positive for arthritis  Additional Results:     ALLERGIES:   -- Dust -- Runny Nose   -- Mold   (Environmental) -- Runny Nose   -- Pollen -- Runny Nose       Lab Results       Component                Value               Date                       WBC                      3.1 (L)             11/12/2018                 RBC                      4.64                11/12/2018                 HGB                      13.6                11/12/2018                 HCT                      43.1                11/12/2018                 MCHC                     31.6 (L)            11/12/2018                 SODIUM                   142                 11/07/2020                 POTASSIUM                4.3                 11/07/2020                 CHLORIDE                 108 (H)             11/07/2020                 CO2                      29                  11/07/2020                 GLUCOSE                  84                  11/07/2020                 BUN                      14                  11/07/2020                 CREATININE               1.08 (H)            11/07/2020                 GFRA                     65                  11/07/2020                 GFRNA                    56                  11/07/2020                 CALCIUM                  9.5                 11/07/2020                 PLT                      188                 11/12/2018             Past Medical History:  No date: Arthritis  No date: Essential (primary)  Procedure:  Level of Consciousness: [x]Alert [x]Oriented []Disoriented []Lethargic  Anxiety Level: []Calm [x]Anxious []Depressed []Other  Skin: []Warm [x]Dry []Cool []Moist []Intact []Other  Cardiovascular: [x]Palpitations: [x]Never []Occasionally []Frequently  Chest Pain: [x]No []Yes  Respiratory:  [x]Unlabored []Labored []Cough ([] Productive []Unproductive)  HCG Required: [x]No []Yes   Results: []Negative []Positive  Knowledge Level:        [x]Patient/Other verbalized understanding of pre-procedure instructions. [x]Assessment of post-op care needs (transportation, responsible caregiver)        [x]Able to discuss health care problems and how to deal with it.   Factors that Affect Teaching:        Language Barrier: [x]No []Yes - why:        Hearing Loss:        [x]No []Yes            Corrective Device:  []Yes []No        Vision Loss:           [x]No []Yes            Corrective Device:  []Yes []No        Memory Loss:       [x]No []Yes            []Short Term []Long Term  Motivational Level:  [x]Asks Questions                  []Extremely Anxious       [x]Seems Interested               []Seems Uninterested                  [x]Denies need for Education  Risk for Injury:  [x]Patient oriented to person, place and time  []History of frequent falls/loss of balance  Nutritional:  []Change in appetite   []Weight Gain   []Weight Loss  Functional:  []Requires assistance with ADL's hypertension  No date: Glaucoma (increased eye pressure)    Past Surgical History:  No date: Colonoscopy  08/2010: Hysterectomy      Comment:  prolapse uterus and bladder  No date: Ovary surgery  No date: Total abdom hysterectomy       Prior to Admission medications :  Medication Misc Natural Products (Glucosamine Chond Cmp Double) Tab, Sig Take 2 tablets by mouth daily., Start Date , End Date , Taking? Yes, Authorizing Provider Outside Provider    Medication cholecalciferol (cholecalciferol) 25 mcg (1,000 units) tablet, Sig , Start Date , End Date , Taking? Yes, Authorizing Provider Outside Provider    Medication Cyanocobalamin (VITAMIN B-12 PO), Sig Take 2,500 mg by mouth daily., Start Date , End Date , Taking? Yes, Authorizing Provider Outside Provider    Medication amLODIPine (NORVASC) 5 MG tablet, Sig TAKE 1 TABLET BY MOUTH DAILY, Start Date 5/31/21, End Date 5/31/22, Taking? Yes, Authorizing Provider Sulema Powell MD    Medication latanoprost (XALATAN) 0.005 % ophthalmic solution, Sig Place 1 drop into both eyes nightly. , Start Date , End Date , Taking? Yes, Authorizing Provider Outside Provider    Medication amLODIPine (NORVASC) 5 MG tablet, Sig TAKE 1 TABLET BY MOUTH DAILY, Start Date 2/25/21, End Date , Taking? , Authorizing Provider Sulema Powell MD            Relevant Problems   Anesthesia Problems (within normal limits)       Physical Exam     Airway   Mallampati: II  TM Distance: >3 FB  Neck ROM: Full  Neck: Non-tender and Able to place in sniff position  TMJ Mobility: Good    Cardiovascular  Cardiovascular exam normal  Cardio Rhythm: Regular  Cardio Rate: Normal    Head Assessment  Head assessment: Normocephalic and Atraumatic    General Assessment  General Assessment: Alert and oriented and No acute distress    Dental Exam  Dental exam normal    Pulmonary Exam  Pulmonary exam normal  Breath sounds clear to auscultation:  Yes    Abdominal Exam  Abdominal exam normal      Anesthesia  Plan:  Anesthesia Plan    ASA Status: 2    Anesthesia Type: MAC    Induction: Intravenous  Preferred Airway Type: Nasal Cannula      Post-op Pain Management: Per Surgeon      Checklist  Reviewed: Lab Results, EKG, Chest X-Rays, Nursing Notes, Patient Summary, Beta Blocker Status, Outside Records, Care Everywhere, DNR Status, NPO Status, Allergies, Medications, Problem list and Past Med History  Consent/Risks Discussed Statement:  The proposed anesthetic plan, including its risks and benefits, have been discussed with the Patient along with the risks and benefits of alternatives. Questions were encouraged and answered and the patient and/or representative understands and agrees to proceed.        I discussed with the patient (and/or patient's legal representative) the risks and benefits of the proposed anesthesia plan, MAC, which may include services performed by other anesthesia providers.    Alternative anesthesia plans, if available, were reviewed with the patient (and/or patient's legal representative). Discussion has been held with the patient (and/or patient's legal representative) regarding risks of anesthesia, which include Sore Throat, Dental Injury, Hypotension, Allergic Reaction, Aspiration, Intra-operative Awareness, Emergence Delirium and conversion to general anesthesia and emergent situations that may require change in anesthesia plan.    The patient (and/or patient's legal representative) has indicated understanding, his/her questions have been answered, and he/she wishes to proceed with the planned anesthetic.    Comments  Plan Comments: RECORDS, EKG AND CHEST X-RAYS WHEN AVAILABLE

## 2023-10-25 NOTE — DISCHARGE INSTRUCTIONS
[address] [facility]  Post Procedure Discharge Instructions        YOU HAVE HAD THE FOLLOWING PROCEDURE:                                  [] Occipital Nerve Blocks  [] CTS wrist injection(s)  [] Knee Injection(s)         [] Shoulder Injection(s)   [] Elbow Injection(s)     [] Botox Injection  [] Cervical Trigger Point Injections    [] Thoracic Trigger Point Injections    [] Lumbar Trigger Point Injections  [] Piriformis Trigger Point Injections  [x] SI Joint Injection(s)     [x] Trochanteric Bursa Injection(s)       [] Ankle Injection(s)   [] Plantar Fasciitis   []  ______________  Injection(s) [] Botox []  Migraines [] Spasticity    YOU HAVE RECEIVED THE FOLLOWING MEDICATIONS IN YOUR INJECTION(s)  [x] Lidocaine [x] Bupivacaine   [] DepoMedrol (steroid) [] Decadron (steroid)  [x]  Kenalog (steroid)   [] Toradol  [] Supartz [] Debbie Erm    [] Botox        PATIENT INFORMATION:   You may experience the following symptoms after your procedure. These symptoms are normal and should not cause concern: You may have an increase in your pain. This may last 24 - 48 hours after your procedure. You may have no change in the pain that you had prior to your injection(s). You may have weakness or numbness in your affected extremity. If this occurs, this may last until numbing the medication wears off. REPORT THE FOLLOWING SYMPTOMS TO YOUR DOCTOR:  Redness, swelling or drainage at the injection site(s)  Unusual pain that interferes with your normal activities of daily living. OTHER INSTRUCTIONS:    [x] I will apply ice to the injection site(s) for at least 24 hours after the procedure. I will rotate the ice on for 20 minutes and off for 20 minutes for at least 24 hours. [x] I will not apply heat for at least 48 hours and I will not take a hot bath or shower for at least 24 hours.      [x] I understand that if Lidocaine or Bupivacaine was used in my injection(s) that the injection site(s)

## 2023-11-03 PROBLEM — M53.3 SI (SACROILIAC) JOINT DYSFUNCTION: Status: ACTIVE | Noted: 2023-11-03

## 2023-11-03 ASSESSMENT — ENCOUNTER SYMPTOMS
BACK PAIN: 1
CONSTIPATION: 0

## 2023-11-07 ENCOUNTER — LAB (OUTPATIENT)
Dept: FAMILY MEDICINE CLINIC | Facility: CLINIC | Age: 68
End: 2023-11-07
Payer: MEDICARE

## 2023-11-07 DIAGNOSIS — C34.91 ADENOCARCINOMA, LUNG, RIGHT: ICD-10-CM

## 2023-11-13 DIAGNOSIS — G89.29 CHRONIC MIDLINE LOW BACK PAIN WITHOUT SCIATICA: ICD-10-CM

## 2023-11-13 DIAGNOSIS — G89.4 CHRONIC PAIN SYNDROME: Primary | ICD-10-CM

## 2023-11-13 DIAGNOSIS — M54.50 CHRONIC MIDLINE LOW BACK PAIN WITHOUT SCIATICA: ICD-10-CM

## 2023-11-13 DIAGNOSIS — Z02.89 PAIN MEDICATION AGREEMENT SIGNED: ICD-10-CM

## 2023-11-13 RX ORDER — HYDROCODONE BITARTRATE AND ACETAMINOPHEN 7.5; 325 MG/1; MG/1
1 TABLET ORAL 2 TIMES DAILY
Qty: 60 TABLET | Refills: 0 | Status: SHIPPED | OUTPATIENT
Start: 2023-12-13 | End: 2024-01-12

## 2023-11-13 RX ORDER — HYDROCODONE BITARTRATE AND ACETAMINOPHEN 7.5; 325 MG/1; MG/1
1 TABLET ORAL 2 TIMES DAILY
Qty: 60 TABLET | Refills: 0 | Status: SHIPPED | OUTPATIENT
Start: 2023-11-14 | End: 2023-12-14

## 2023-11-13 RX ORDER — GABAPENTIN 300 MG/1
300 CAPSULE ORAL 3 TIMES DAILY
Qty: 90 CAPSULE | Refills: 0 | Status: SHIPPED | OUTPATIENT
Start: 2023-11-13 | End: 2023-12-13

## 2023-11-13 NOTE — PROGRESS NOTES
Kkkljjukkllklllllklllllklkljjlkkloo;l;;p[;[MGW ONC 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  He underwent robotic RUL wedge resection on 7/21/23 (4 months) and has opted for active surveillance.  He is here alone.    I have reviewed the HPI and verified with the patient the accuracy of it. No changes to interval history since the information was documented. Harshal Wells MD 11/20/23      Diagnostic abnormalities:  --medical history includes tobacco use/abuse (over 50 pack years-smoking since age 15--up to 1 pack/day), COPD, emphysema, tuberculosis diagnosed approximately 11 years ago fully treated through the health department and HYUN who presented with worsening shortness of breath and cough.  -- 6/5/2023-CT chest wo contrast-comparison with CT chest, 10/7/2022-PET scan 10/18/2022-Interval increase in size of a spiculated nodule in the right upper lobe, currently measuring 1.6 cm, compared with 10 mm on the previous chest CT and PET/CT. This is suspicious for pulmonary neoplasm and tissue diagnosis is recommended. The nodule is relatively peripheral, may possibly be amenable to navigational bronchoscopy and biopsy, versus CT-guided biopsy. No evidence of intrathoracic lymphadenopathy.  --6/26/23- CT chest wo contrast-1.7 cm spiculated RIGHT upper lobe pulmonary nodule, highly suspicious for neoplasm (increase in size from 10/8/2022 when it measured 10 mm).  No evidence of metastatic disease in chest.  --6/26/23- Flexible fiberoptic  bronchoscopy Bronchoscopy, Diagnostic, Robotic guidance, Transbronchial biopsy, Endobronchial ultrasound, Transbronchial needle aspirate, and transbronchial needle pulmonary aspirate, and radial probe ultrasound by Dr. Fritz.  Final diagnosis: 1.  Lung, right upper lobe, fine-needle aspiration: Adenocarcinoma. 2.  Lymph node, station 11 R, EBUS: Negative for malignant cells. Polymorphous lymphocyte population. 3.  Lung, bronchial washing: Negative for malignant cells. Benign bronchial cells, squamous cells, and macrophages.  --7/13/23-consult by CT surgery, Dr. Philippe.  Most recent CT scan of chest, 6/26/2023 and PET scan from 10/18/2022, as well as PFTs and bronchoscopic biopsy findings on 6/26/2023 reviewed.  Preop stage: T1b N0 M0, stage I A2. Recommend surgical resection, his nodule is borderline less than 2 cm, but in some views looks to be about 2.1. Discuss treatment alternatives of stereotactic radiation and wedge resection if his pulmonary reserve is not adequate to tolerate lobectomy. He did not want to consider stereotactic radiation, he would rather have surgery. Plan on full PFT and if those are adequate, proceed with robotic right upper lobectomy with mediastinal lymph node dissection. PFT since clinic visit:  He has hypoxia on room air (O2 Sat 88%) and DLCO of 44%, given this not a candidate for anatomic lobectomy. Will proceed with wedge resection and lymph node dissection.   --7/23/23-glucose 122, sodium 135, calcium 8.3 otherwise normal BMP with a GFR of 76.9.  Hemoglobin 13.7, hematocrit 44.8, .4, platelets 188,000, WBC 14.47 (no differential).  --8/21/23- Postoperatively the patient is seen for adjuvant management considerations.    Previous interventions:  --7/21/23- 1.  Lung, right upper lobe wedge resection:  A.  Moderately differentiated lung adenocarcinoma, papillary predominate.  B.  Invasive carcinoma measures 1.8 cm in greatest dimension grossly.  C.  Surgical excision margins are  negative for evidence of malignancy.  D.  Negative for evidence of pleural invasion.     2.  Lymph node, level 7 biopsy: Benign lymph node tissue.     3.  Lymph node, level 10 biopsy: Benign lymph node tissue.     4.  Lymph node, level 4R biopsy: Benign lymph node tissue.     5.  Lymph node, level 9 biopsy: Benign lymph node tissue.     AJCC STAGE: pT1b, pN0, pMx    Synoptic Checklist   LUNG   8th Edition - Protocol posted: 9/21/2022LUNG: RESECTION - All Specimens  SPECIMEN   Procedure  Wedge resection   Specimen Laterality  Right   TUMOR   Tumor Focality  Single focus   Tumor Site  Upper lobe of lung   Tumor Size     Total Tumor Size (size of entire tumor)  Greatest Dimension (Centimeters): 1.8 cm   Histologic Type  Invasive papillary adenocarcinoma   Histologic Patterns Present  Papillary     Solid     Micropapillary   Histologic Grade  G2, moderately differentiated   Spread Through Air Spaces (CESAR)  Not identified   Visceral Pleura Invasion  Not identified   Direct Invasion of Adjacent Structures  Not applicable (no adjacent structures present)   Treatment Effect  No known presurgical therapy   Lymphovascular Invasion  Not identified   MARGINS   Margin Status for Invasive Carcinoma  All margins negative for invasive carcinoma   Closest Margin(s) to Invasive Carcinoma  Parenchymal   Distance from Invasive Carcinoma to Closest Margin  0.8 cm   Margin Status for Non-Invasive Tumor  Not applicable   REGIONAL LYMPH NODES   Lymph Node(s) from Prior Procedures  No known prior lymph node sampling performed   Regional Lymph Node Status  All regional lymph nodes negative for tumor   Number of Lymph Nodes Examined  At least: 4   Mar[place]u Site(s) Examined  4R: Lower paratracheal     9R: Pulmonary ligament     10R: Hilar     7: Subcarinal   PATHOLOGIC STAGE CLASSIFICATION (pTNM, AJCC 8th Edition)   Reporting of pT, pN, and (when applicable) pM categories is based on information available to the pathologist at the time the  report is issued. As per the AJCC (Chapter 1, 8th Ed.) it is the managing physician’s responsibility to establish the final pathologic stage based upon all pertinent information, including but potentially not limited to this pathology report.   pT Category  pT1b   pN Category  pN0            LABS    Lab Results - Last 18 Months   Lab Units 08/21/23  1208 07/22/23  0415 07/21/23  1112 07/19/23  1123 06/19/23  1304 12/29/22  0838   HEMOGLOBIN g/dL 15.4 13.7 15.6 16.8 15.7 17.0   HEMATOCRIT % 46.9 44.8 47.9 51.3* 48.7 50.5   MCV fL 93.6 100.4* 96.4 96.1 96.1 94   WBC 10*3/mm3 11.10* 14.47* 10.39 9.75 9.56 10.3   RDW % 13.2 13.1 13.0 12.8 13.2 12.7   MPV fL 10.1 10.6 10.2 11.0 10.4  --    PLATELETS 10*3/mm3 245 188 209 235 226 278   IMM GRAN % % 0.7*  --   --  0.9*  --   --    NEUTROS ABS 10*3/mm3 8.29*  --   --  7.41*  --  7.6*   LYMPHS ABS 10*3/mm3 1.53  --   --  1.22  --  1.3   MONOS ABS 10*3/mm3 0.88  --   --  0.79  --  1.0*   EOS ABS 10*3/mm3 0.21  --   --  0.16  --  0.2   BASOS ABS 10*3/mm3 0.11  --   --  0.08  --  0.1   IMMATURE GRANS (ABS) 10*3/mm3 0.08*  --   --  0.09*  --   --    NRBC /100 WBC 0.0  --   --  0.0  --   --        Lab Results - Last 18 Months   Lab Units 08/21/23  1208 07/23/23  1056 07/22/23  0415 07/21/23  1112 07/19/23  1123 07/14/23  0728 06/19/23  1304 12/29/22  0838   GLUCOSE mg/dL 121* 122* 161* 179* 104*  --  110* 131*   SODIUM mmol/L 138 135* 137 137 137  --  137 137   SODIUM, ARTERIAL mmol/L  --   --   --   --   --  139  --   --    POTASSIUM mmol/L 4.6 4.5 4.7 4.9 4.4  --  4.2 4.9   CO2 mmol/L 25.0 22.0 20.0* 21.0* 24.0  --  24.0 24   CHLORIDE mmol/L 102 100 100 102 101  --  100 101   ANION GAP mmol/L 11.0 13.0 17.0* 14.0 12.0  --  13.0  --    CREATININE mg/dL 0.72* 1.06 1.41* 1.10 0.72*  --  0.76 0.93   BUN mg/dL 10 18 20 11 10  --  12 15   BUN / CREAT RATIO  13.9 17.0 14.2 10.0 13.9  --  15.8 16   CALCIUM mg/dL 9.6 8.3* 8.3* 8.7 9.4  --  9.3 9.5   ALK PHOS U/L 88  --   --   --  78  --   " --  74   TOTAL PROTEIN g/dL 7.3  --   --   --  7.2  --   --   --    ALT (SGPT) U/L 41  --   --   --  45*  --   --  45*   AST (SGOT) U/L 40  --   --   --  42*  --   --  38   BILIRUBIN mg/dL 0.5  --   --   --  0.5  --   --  0.6   ALBUMIN g/dL 4.5  --   --   --  4.5  --   --  4.3   GLOBULIN gm/dL 2.8  --   --   --  2.7  --   --   --        No results for input(s): \"MSPIKE\", \"KAPPALAMB\", \"IGLFLC\", \"URICACID\", \"FREEKAPPAL\", \"CEA\", \"LDH\", \"REFLABREPO\" in the last 60362 hours.    Lab Results - Last 18 Months   Lab Units 08/21/23  1208 12/29/22  0838   IRON mcg/dL 83  --    TIBC mcg/dL 361  --    IRON SATURATION (TSAT) % 23  --    FERRITIN ng/mL 309.20  --    T4 TOTAL ug/dL  --  7.9   TSH uIU/mL  --  1.150   FOLATE ng/mL 9.95  --          PAST MEDICAL HISTORY:  ALLERGIES:  No Known Allergies  CURRENT MEDICATIONS:  Outpatient Encounter Medications as of 11/20/2023   Medication Sig Dispense Refill    amLODIPine (NORVASC) 5 MG tablet Take 1 tablet by mouth Daily. 90 tablet 1    aspirin 81 MG tablet Take 1 tablet by mouth Daily.      citalopram (CeleXA) 20 MG tablet Take 1 tablet by mouth Daily. 90 tablet 1    gabapentin (NEURONTIN) 300 MG capsule Take 1 capsule by mouth 3 (Three) Times a Day.      HYDROcodone-acetaminophen (NORCO) 7.5-325 MG per tablet Take 1 tablet by mouth Every 12 (Twelve) Hours As Needed for Moderate Pain.      HYDROcodone-acetaminophen (NORCO) 7.5-325 MG per tablet Take 1 tablet by mouth Every 6 (Six) Hours As Needed for Moderate Pain. 48 tablet 0    ibuprofen (ADVIL,MOTRIN) 100 MG/5ML suspension Take 10 mL by mouth Every 6 (Six) Hours As Needed for Mild Pain.      ibuprofen (ADVIL,MOTRIN) 800 MG tablet Take 1 tablet by mouth Every 6 (Six) Hours As Needed for Mild Pain. Pt states he takes four 200mg tablets in the morning and three 200mg tablets in the evening      metFORMIN (GLUCOPHAGE) 500 MG tablet Take 1 tablet by mouth Daily With Breakfast. 90 tablet 1    metoprolol tartrate (LOPRESSOR) 50 MG tablet " Take 1 tablet by mouth 2 (Two) Times a Day. 180 tablet 1    naloxone (NARCAN) 4 MG/0.1ML nasal spray 1 spray into the nostril(s) as directed by provider As Needed (opioid overdose). For Hydrocodone prescription      pantoprazole (PROTONIX) 20 MG EC tablet Take 1 tablet by mouth Daily. 90 tablet 1    simvastatin (ZOCOR) 40 MG tablet Take 1 tablet by mouth Daily. 90 tablet 1    [DISCONTINUED] traMADol (ULTRAM) 50 MG tablet Take 1 tablet by mouth Every 6 (Six) Hours As Needed for Moderate Pain. (Patient not taking: Reported on 11/20/2023) 25 tablet 0     Facility-Administered Encounter Medications as of 11/20/2023   Medication Dose Route Frequency Provider Last Rate Last Admin    cyanocobalamin injection 1,000 mcg  1,000 mcg Intramuscular Q28 Days Carol Ann Sapp, ARIAN   1,000 mcg at 09/29/23 0832     Adult illnesses:  Arthritis  Chronic back pain  Hypertension  Diabetes mellitus  Hyperlipidemia  History of pulmonary tuberculosis  Hypertension  Left upper lobe nodule/adenocarcinoma  History of nicotine dependence  HYUN    Past surgeries:  Bronchoscopy with Ion robotic assisted, 6/26/2023  Cholecystectomy  Colonoscopy  Endoscopy  Right eye cataract surgery  Patella fracture surgery, left  Right upper lobectomy, 7/21/2023.  Right thoracoscopy with da Margy robot wedge resection, mediastinal lymph node dissection, possible lobectomy-Dr. Philippe    ADULT ILLNESSES:  Patient Active Problem List   Diagnosis Code    Lumbar back pain M54.50    Gastroesophageal reflux disease without esophagitis K21.9    Essential hypertension I10    Hyperlipidemia LDL goal <100 E78.5    Left upper lobe pulmonary nodule R91.1    History of primary tuberculosis Z86.11    Personal history of nicotine dependence Z87.891    Panlobular emphysema J43.1    Cervical paraspinal muscle spasm M62.838    Annual physical exam Z00.00    Obstructive sleep apnea G47.33    Hilar adenopathy R59.0    Adenocarcinoma, lung, right C34.91    Lung cancer C34.90     Tobacco use Z72.0     SURGERIES:  Past Surgical History:   Procedure Laterality Date    BRONCHOSCOPY WITH ION ROBOTIC ASSIST N/A 6/26/2023    Procedure: BRONCHOSCOPY WITH ION ROBOT;  Surgeon: Rj Fritz MD;  Location:  [facility] OR;  Service: Robotics - Pulmonary;  Laterality: N/A;  pre lung mass   post lung mass   Carol Ann Sapp    CHOLECYSTECTOMY      COLONOSCOPY      ENDOSCOPY      EYE SURGERY Right     cataract surgery    LOBECTOMY Right 7/21/2023    Procedure: RIGHT THORACOSCOPY WITH DAVINCI ROBOT WITH WEDGE RESECTION, POSSIBLE LOBECTOMY, MEDIASTINAL LYMPH NODE DISSECTION, POSSIBLE LOBECTOMY;  Surgeon: Shaggy Philippe MD;  Location:  [facility] OR;  Service: Robotics - DaVinci;  Laterality: Right;    PATELLA FRACTURE SURGERY Left      HEALTH MAINTENANCE ITEMS:  Health Maintenance Due   Topic Date Due    URINE MICROALBUMIN  Never done    COVID-19 Vaccine (1) Never done    DIABETIC FOOT EXAM  Never done    DIABETIC EYE EXAM  Never done    INFLUENZA VACCINE  08/01/2023       <no information>  Last Completed Colonoscopy            COLORECTAL CANCER SCREENING (COLONOSCOPY - Every 10 Years) Next due on 5/3/2026      05/03/2016  COLONOSCOPY (Done)                  Immunization History   Administered Date(s) Administered    Fluzone Quad >6mos (Multi-dose) 10/10/2012    Influenza Quad Vaccine (Inpatient) 10/10/2012     Last Completed Mammogram       This patient has no relevant Health Maintenance data.              FAMILY HISTORY:  Family History   Problem Relation Age of Onset    Hypertension Mother     No Known Problems Father     Diabetes Neg Hx     Cancer Neg Hx     Stroke Neg Hx      SOCIAL HISTORY:  Social History     Socioeconomic History    Marital status:    Tobacco Use    Smoking status: Every Day     Packs/day: 1.00     Years: 53.00     Additional pack years: 0.00     Total pack years: 53.00     Types: Cigarettes     Start date: 1970     Passive exposure: Never    Smokeless tobacco: Never   Vaping Use  "   Vaping Use: Never used   Substance and Sexual Activity    Alcohol use: Not Currently     Alcohol/week: 42.0 - 56.0 standard drinks of alcohol     Types: 42 - 56 Cans of beer per week     Comment: 6-8 beers per day    Drug use: No    Sexual activity: Not Currently       REVIEW OF SYSTEMS:  Review of Systems   Constitutional: Negative.  Negative for fatigue and unexpected weight loss.        Manages his ADLs, to include chores, errands and driving.  He is still up and about, \"most of the time.\"   HENT:  Positive for postnasal drip.    Eyes: Negative.    Respiratory:  Positive for cough (\"Some.\"  Mostly dry) and shortness of breath (Baseline exertional dyspnea but denies sob with routine activities).         Current smoker-age 15-present (1/2 ppd - prior: 1 pack/day).  Still no plans to stop.  \"I'm just being honest doc.\"   Cardiovascular: Negative.    Gastrointestinal: Negative.    Endocrine: Negative.    Genitourinary: Negative.    Musculoskeletal:  Positive for arthralgias (chronic), back pain (chronic) and myalgias (Intermittent nocturnal leg cramps and with ambulation). Negative for neck pain.   Skin: Negative.    Allergic/Immunologic: Negative.    Neurological: Negative.    Hematological: Negative.    Psychiatric/Behavioral: Negative.         /68   Pulse 92   Temp 97.1 °F (36.2 °C) (Temporal)   Resp 18   Ht 188 cm (74\")   Wt 102 kg (224 lb 12.8 oz)   SpO2 91%   BMI 28.86 kg/m²  Body surface area is 2.28 meters squared.  Pain Score    11/20/23 0952   PainSc: 0-No pain         Physical Exam  Vitals and nursing note reviewed.   Constitutional:       Appearance: He is obese.      Comments: Pleasant, cooperative, modestly kept, obese elderly male.  Ambulatory.  ECOG 0.      Has intentionally lost 8 lb since the prior visit.  \"Portion control.\"   HENT:      Head: Normocephalic and atraumatic.      Mouth/Throat:      Mouth: Mucous membranes are moist.      Pharynx: Oropharynx is clear.      Comments: " Poor dentition with multiple missing teeth  Eyes:      General: No scleral icterus.     Extraocular Movements: Extraocular movements intact.      Pupils: Pupils are equal, round, and reactive to light.   Cardiovascular:      Rate and Rhythm: Normal rate.   Pulmonary:      Effort: Pulmonary effort is normal.      Comments: Distant breath sounds bilaterally    Healed laparoscopic thoracotomy incisions  Abdominal:      Palpations: Abdomen is soft.      Tenderness: There is no abdominal tenderness.   Musculoskeletal:         General: Normal range of motion.      Cervical back: Normal range of motion.   Lymphadenopathy:      Cervical: No cervical adenopathy.   Skin:     General: Skin is warm.   Neurological:      General: No focal deficit present.      Mental Status: He is alert and oriented to person, place, and time.   Psychiatric:         Mood and Affect: Mood normal.         Behavior: Behavior normal.         Thought Content: Thought content normal.       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 This is suspicious for pulmonary neoplasm and tissue diagnosis is recommended. The nodule is relatively peripheral, may possibly be amenable to navigational bronchoscopy and biopsy, versus CT-guided biopsy. No evidence of intrathoracic lymphadenopathy.    -- Tumor status:  -- 7/21/23- robotic RUL wedge resection, MLND Final diagnosis:  1.8 cm tumor right upper lobe, all regional nodes negative for malignancy (number of nodes: At least 4; 4R lower paratracheal, 9R pulmonary ligament, 10 R, hilar, 7 subcarinal).  Surgical margins: All margins negative for  invasive carcinoma .  --7/31/23- PD-L1- High expression (TPS- 95%)  --8/2/23 - EGFR mutation - NOT detected  --8/3/23 - BRAF mutation -  Detected low level; ALK/ROS1/MET/RET (FISH) - negative  --8/29/23- CT chest- Interval right upper lobe wedge resection with linear scarring and/or granulation suspected in the right upper lobe along the anastomotic line. Very small right pleural effusion. Stable left upper lobe scarring. 3 mm right upper lobe pulmonary nodule can be followed on subsequent imaging. No increasing intrathoracic or axillary lymphadenopathy.    2.  Emphysema  3.  Nicotine dependence.  Continues to smoke 1/2- 1 ppd - 1 pack/day since age 15 (>50 pack year smoker)  4.  History of TB  5.  Diabetes  6.  Beer use/excess- 6-8 cans/day since age 22  7.  Mild macrocytic anemia.  Contribution from malignancy/anemia of chronic disease  --Resolved.  Hgb 16.7; MCV 96, 11/7/23 (prior: Hgb 13.7-17)  8.   Degenerative changes hips, pubic symphisis, SI joint and spondylosis is noted in the lumbar spine   --Xray pelvis and lumbar spine, 10/11/23  9.   Self directed    Plan:  Review labs from 8/21/23 - 11/7/23.  Mild leukocytosis otherwise normal CBC, glucose 123 otherwise normal CMP, repleted iron/B12/folate levels  Schedule CT chest this week if not yet done  Review xray pelvis and lumbar spine, 10/11/23 (above)  Review CT chest, 8/29/23 (above).  3 mm RUL nodule, otherwise SALAZAR  Review and discuss the NCCN guidelines version 3.2023 non-small cell lung cancer after definitive surgical intervention: Stage IA with negative margins-adjuvant treatment: Observation.  Stage IB (T2a, M0)-margins negative (R0)-observe or chemotherapy for high risk patients (poorly differentiated tumors/neuroendocrine tumors, vascular invasion, wedge resection, tumors> 4 cm, visceral pleural involvement, and unknown lymph node status).  Surveillance: Stage 1-2 (primary treatment included surgery+/-chemotherapy): H&P and chest CT+/-contrast every  "6 months for 2-3 years then H&P and low-dose noncontrast enhanced chest CT annually.  Given his continued smoking, and wedge resection we discussed the rationale for adjuvant systemic therapy (chemotherapy) at some length.  Potential toxicities (to include myelosuppression and risk of infection, etc.) also discussed.  Ultimately the patient declined systemic therapy stating that, \"I am not quitting smoking anyway.\"  Prefers active surveillance and, \"we will deal with it if it ever comes back.\"  5.   Schedule CT chest with IV contrast in 11 weeks at [facility].  Compare to previous studies.  6.   Return to office in 12 weeks preoffice CBC and diff, CMP, CEA, iron, fe sat, ferritin, b12, folate.       I spent ~42 minutes caring for Rupa on this date of service. This time includes time spent by me in the following activities: preparing for the visit, reviewing tests, performing a medically appropriate examination and/or evaluation, counseling and educating the patient/family/caregiver, ordering medications, tests, or procedures and documenting information in the medical record.         "

## 2023-11-20 ENCOUNTER — OFFICE VISIT (OUTPATIENT)
Dept: ONCOLOGY | Facility: CLINIC | Age: 68
End: 2023-11-20
Payer: MEDICARE

## 2023-11-20 VITALS
WEIGHT: 224.8 LBS | BODY MASS INDEX: 28.85 KG/M2 | DIASTOLIC BLOOD PRESSURE: 68 MMHG | OXYGEN SATURATION: 91 % | SYSTOLIC BLOOD PRESSURE: 140 MMHG | RESPIRATION RATE: 18 BRPM | HEART RATE: 92 BPM | HEIGHT: 74 IN | TEMPERATURE: 97.1 F

## 2023-11-20 DIAGNOSIS — C34.91 ADENOCARCINOMA, LUNG, RIGHT: Primary | ICD-10-CM

## 2023-11-30 ENCOUNTER — CLINICAL SUPPORT (OUTPATIENT)
Dept: FAMILY MEDICINE CLINIC | Facility: CLINIC | Age: 68
End: 2023-11-30
Payer: MEDICARE

## 2023-11-30 DIAGNOSIS — E53.8 B12 DEFICIENCY: Primary | ICD-10-CM

## 2023-11-30 RX ADMIN — CYANOCOBALAMIN 1000 MCG: 1000 INJECTION, SOLUTION INTRAMUSCULAR; SUBCUTANEOUS at 11:39

## 2023-11-30 NOTE — PROGRESS NOTES
Patient presented self to the office today 11/30/23 for a B12 injection Administered in left deltoid. Patient tolerated well and waited 15 minutes without issue.

## 2023-12-11 DIAGNOSIS — G89.29 CHRONIC MIDLINE LOW BACK PAIN WITHOUT SCIATICA: ICD-10-CM

## 2023-12-11 DIAGNOSIS — Z02.89 PAIN MEDICATION AGREEMENT SIGNED: ICD-10-CM

## 2023-12-11 DIAGNOSIS — G89.4 CHRONIC PAIN SYNDROME: ICD-10-CM

## 2023-12-11 DIAGNOSIS — M54.50 CHRONIC MIDLINE LOW BACK PAIN WITHOUT SCIATICA: ICD-10-CM

## 2023-12-11 RX ORDER — GABAPENTIN 300 MG/1
300 CAPSULE ORAL 3 TIMES DAILY
Qty: 90 CAPSULE | Refills: 0 | Status: SHIPPED | OUTPATIENT
Start: 2023-12-13 | End: 2024-01-12

## 2023-12-26 ENCOUNTER — CLINICAL SUPPORT (OUTPATIENT)
Dept: FAMILY MEDICINE CLINIC | Facility: CLINIC | Age: 68
End: 2023-12-26
Payer: MEDICARE

## 2023-12-26 RX ADMIN — CYANOCOBALAMIN 1000 MCG: 1000 INJECTION, SOLUTION INTRAMUSCULAR; SUBCUTANEOUS at 10:47

## 2023-12-27 ENCOUNTER — HOSPITAL ENCOUNTER (OUTPATIENT)
Dept: PAIN MANAGEMENT | Age: 68
Discharge: HOME | End: 2023-12-27
Payer: MEDICARE

## 2023-12-27 VITALS
WEIGHT: 227 LBS | BODY MASS INDEX: 29.13 KG/M2 | HEIGHT: 74 IN | OXYGEN SATURATION: 95 % | DIASTOLIC BLOOD PRESSURE: 73 MMHG | SYSTOLIC BLOOD PRESSURE: 150 MMHG | RESPIRATION RATE: 16 BRPM | HEART RATE: 77 BPM | TEMPERATURE: 97.4 F

## 2023-12-27 DIAGNOSIS — G89.29 CHRONIC MIDLINE LOW BACK PAIN WITHOUT SCIATICA: ICD-10-CM

## 2023-12-27 DIAGNOSIS — G89.4 CHRONIC PAIN SYNDROME: ICD-10-CM

## 2023-12-27 DIAGNOSIS — M54.50 CHRONIC MIDLINE LOW BACK PAIN WITHOUT SCIATICA: ICD-10-CM

## 2023-12-27 DIAGNOSIS — Z02.89 PAIN MEDICATION AGREEMENT SIGNED: ICD-10-CM

## 2023-12-27 PROCEDURE — 99214 OFFICE O/P EST MOD 30 MIN: CPT | Performed by: PHYSICIAN ASSISTANT

## 2023-12-27 PROCEDURE — 99214 OFFICE O/P EST MOD 30 MIN: CPT

## 2023-12-27 RX ORDER — HYDROCODONE BITARTRATE AND ACETAMINOPHEN 7.5; 325 MG/1; MG/1
1 TABLET ORAL 2 TIMES DAILY PRN
Qty: 60 TABLET | Refills: 0 | Status: SHIPPED | OUTPATIENT
Start: 2024-01-12 | End: 2024-02-11

## 2023-12-27 RX ORDER — GABAPENTIN 300 MG/1
300 CAPSULE ORAL 3 TIMES DAILY
Qty: 90 CAPSULE | Refills: 2 | Status: SHIPPED | OUTPATIENT
Start: 2024-01-12 | End: 2024-04-11

## 2023-12-27 NOTE — PROGRESS NOTES
Clinic Documentation      Education Provided:  [x] Review Lala Perales  [] Agreement Review  [x] PEG Score Calculated [] PHQ Score Calculated [] ORT Score Calculated    [] Compliance Issues Discussed [] Cognitive Behavior Needs [x] Exercise [] Review of Test [] Financial Issues  [x] Tobacco/Alcohol Use Reviewed [x] Teaching [] New Patient [] Picture Obtained    Physician Plan:  [] Outgoing Referral  [] Pharmacy Consult  [x] Test Ordered [x] Prescription Ordered/Changed   [] Obtained Test Results / Consult Notes        Complete if patient is withholding blood thinner for procedure     Blood Thinner Patient is currently taking:      [] Plavix (Hold for 7 days)  [] Aspirin (Hold for 5 days)     [] Pletal (Hold for 2 days)  [] Pradaxa (Hold for 3 days)    [] Effient (Hold for 7 days)  [] Xarelto (Hold for 2 days)    [] Eliquis (Hold for 2 days)  [] Brilinta (Hold for 7 days)    [] Coumadin (Hold for 5 days) - (INR needs to be drawn the day prior to procedure- INR < 2.0)    [] Aggrenox (Hold for 7 days)        [] Patient will stop medication on their own.    [] Blood Thinner Form Faxed for approval to hold.    Provider form faxed to:     Assessment Completed by:  Electronically signed by Gloria Duran MA on 12/27/2023 at 10:33 AM        Clinic Documentation      Education Provided:  [x] Review bharti Guillermo  [] Agreement Review  [x] PEG Score Calculated [] PHQ Score Calculated [] ORT Score Calculated    [] Compliance Issues Discussed [] Cognitive Behavior Needs [x] Exercise [] Review of Test [] Financial Issues  [x] Tobacco/Alcohol Use Reviewed [x] Teaching [] New Patient [] Picture Obtained    Physician Plan:  [] Outgoing Referral  [] Pharmacy Consult  [] Test Ordered [] Prescription Ordered/Changed   [] Obtained Test Results / Consult Notes        Complete if patient is withholding blood thinner for procedure     Blood Thinner Patient is currently taking:      [] Plavix (Hold for 7 days)  [] Aspirin (Hold for 5 days)
Musculoskeletal:         General: Tenderness present. Normal range of motion. Cervical back: Normal range of motion and neck supple. Comments: He has pain on palpation of right SI joint and troch bursa  He has 3 provocative test + for SI dysfunction  He has tenderness upon palpation right troch bursa   Skin:     General: Skin is warm and dry. Neurological:      Mental Status: He is alert and oriented to person, place, and time. Psychiatric:         Behavior: Behavior normal.         Thought Content: Thought content normal.         Judgment: Judgment normal.       Labs:     Lab Results   Component Value Date/Time     02/09/2011 10:00 AM    K 5.0 02/09/2011 10:00 AM     02/09/2011 10:00 AM    CO2 30 02/09/2011 10:00 AM    BUN 8 02/09/2011 10:00 AM    CREATININE 1.0 02/09/2011 10:00 AM    GLUCOSE 110 02/09/2011 10:00 AM    CALCIUM 9.6 02/09/2011 10:00 AM        Lab Results   Component Value Date    WBC 8.13 02/09/2011    HGB 17.9 02/09/2011    HCT 52.6 (H) 02/09/2011    MCV 95.3 (H) 02/09/2011     02/09/2011       X-rays -  Bilateral hip and pelvis - 10/10/23  IMPRESSION:  FINDINGS/IMPRESSION:     No discrete fracture line. No dislocation. Mild degenerative changes to the hips. Mild degenerative changes to the pubic symphysis and sacroiliac joints. X-ray Lumbar Spine - 10/10/23  FINDINGS:  There is straightening of the normal lumbar lordosis. Mild hypertrophic endplate changes noted at L1-L2, 2/3, 45 and L5-S1. The vacuum phenomenon is present at L4-L5 and L5-S1. There may be a subtle pars defect and L5. SI joints are normal.  Vascular calcifications present in the abdominal aorta. Surgical clips are present from cholecystectomy. IMPRESSION:  1. Degenerative spondylosis is noted in the lumbar spine. A subtle pars defect at L5 may be present.     Assessment:

## 2023-12-27 NOTE — TELEPHONE ENCOUNTER
1. Chronic midline low back pain without sciatica    2. Pain medication agreement signed    3. Chronic pain syndrome      Requested Prescriptions     Pending Prescriptions Disp Refills    HYDROcodone-acetaminophen (NORCO) 7.5-325 MG per tablet 60 tablet 0     Sig: Take 1 tablet by mouth 2 times daily as needed for Pain for up to 30 days. May fill 12/13/23 Max Daily Amount: 2 tablets    gabapentin (NEURONTIN) 300 MG capsule 90 capsule 2     Sig: Take 1 capsule by mouth 3 times daily for 90 days.  Max Daily Amount: 900 mg       Continue medication with refill sent at appointment yes; refill sent to medical director at appointment yes, see refill encounter dated 12/27/2023    Electronically signed by Kelsea Parsons PA-C on 12/27/2023 at 11:10 AM

## 2024-01-08 ENCOUNTER — TELEPHONE (OUTPATIENT)
Dept: PAIN MANAGEMENT | Age: 69
End: 2024-01-08

## 2024-01-08 NOTE — TELEPHONE ENCOUNTER
Called patient to advise him that his epidural with Dr. Chaudhary has been cancelled due to insurance purposes. I explained to him that he will have the procedure with Ashley as planned. Patient voiced understanding.

## 2024-01-10 ENCOUNTER — OFFICE VISIT (OUTPATIENT)
Dept: FAMILY MEDICINE CLINIC | Facility: CLINIC | Age: 69
End: 2024-01-10
Payer: MEDICARE

## 2024-01-10 VITALS
HEIGHT: 74 IN | TEMPERATURE: 98 F | WEIGHT: 233.2 LBS | BODY MASS INDEX: 29.93 KG/M2 | OXYGEN SATURATION: 96 % | SYSTOLIC BLOOD PRESSURE: 121 MMHG | DIASTOLIC BLOOD PRESSURE: 57 MMHG | HEART RATE: 66 BPM

## 2024-01-10 DIAGNOSIS — F32.89 OTHER DEPRESSION: ICD-10-CM

## 2024-01-10 DIAGNOSIS — Z00.00 MEDICARE ANNUAL WELLNESS VISIT, SUBSEQUENT: Primary | ICD-10-CM

## 2024-01-10 DIAGNOSIS — I10 ESSENTIAL HYPERTENSION: ICD-10-CM

## 2024-01-10 DIAGNOSIS — E66.3 OVERWEIGHT WITH BODY MASS INDEX (BMI) OF 29 TO 29.9 IN ADULT: ICD-10-CM

## 2024-01-10 DIAGNOSIS — E78.5 HYPERLIPIDEMIA LDL GOAL <100: ICD-10-CM

## 2024-01-10 DIAGNOSIS — R73.03 PREDIABETES: ICD-10-CM

## 2024-01-10 DIAGNOSIS — K21.9 GASTROESOPHAGEAL REFLUX DISEASE, UNSPECIFIED WHETHER ESOPHAGITIS PRESENT: ICD-10-CM

## 2024-01-10 DIAGNOSIS — Z87.891 PERSONAL HISTORY OF NICOTINE DEPENDENCE: ICD-10-CM

## 2024-01-10 PROCEDURE — 1170F FXNL STATUS ASSESSED: CPT | Performed by: NURSE PRACTITIONER

## 2024-01-10 PROCEDURE — 1160F RVW MEDS BY RX/DR IN RCRD: CPT | Performed by: NURSE PRACTITIONER

## 2024-01-10 PROCEDURE — G0439 PPPS, SUBSEQ VISIT: HCPCS | Performed by: NURSE PRACTITIONER

## 2024-01-10 PROCEDURE — 3078F DIAST BP <80 MM HG: CPT | Performed by: NURSE PRACTITIONER

## 2024-01-10 PROCEDURE — 3044F HG A1C LEVEL LT 7.0%: CPT | Performed by: NURSE PRACTITIONER

## 2024-01-10 PROCEDURE — 1159F MED LIST DOCD IN RCRD: CPT | Performed by: NURSE PRACTITIONER

## 2024-01-10 PROCEDURE — 3074F SYST BP LT 130 MM HG: CPT | Performed by: NURSE PRACTITIONER

## 2024-01-10 RX ORDER — PANTOPRAZOLE SODIUM 20 MG/1
20 TABLET, DELAYED RELEASE ORAL DAILY
Qty: 90 TABLET | Refills: 1 | Status: SHIPPED | OUTPATIENT
Start: 2024-01-10

## 2024-01-10 RX ORDER — CITALOPRAM 20 MG/1
20 TABLET ORAL DAILY
Qty: 90 TABLET | Refills: 1 | Status: SHIPPED | OUTPATIENT
Start: 2024-01-10

## 2024-01-10 RX ORDER — AMLODIPINE BESYLATE 5 MG/1
5 TABLET ORAL DAILY
Qty: 90 TABLET | Refills: 1 | Status: SHIPPED | OUTPATIENT
Start: 2024-01-10

## 2024-01-10 RX ORDER — METOPROLOL TARTRATE 50 MG/1
50 TABLET, FILM COATED ORAL 2 TIMES DAILY
Qty: 180 TABLET | Refills: 1 | Status: SHIPPED | OUTPATIENT
Start: 2024-01-10

## 2024-01-10 RX ORDER — SIMVASTATIN 40 MG
40 TABLET ORAL DAILY
Qty: 90 TABLET | Refills: 1 | Status: SHIPPED | OUTPATIENT
Start: 2024-01-10

## 2024-01-10 NOTE — PROGRESS NOTES
The ABCs of the Annual Wellness Visit  Subsequent Medicare Wellness Visit    Subjective      Rupa Verma is a 68 y.o. male who presents for a Subsequent Medicare Wellness Visit.    The following portions of the patient's history were reviewed and   updated as appropriate: allergies, current medications, past family history, past medical history, past social history, past surgical history, and problem list.    Compared to one year ago, the patient feels his physical   health is the same.    Compared to one year ago, the patient feels his mental   health is the same.    Recent Hospitalizations:  This patient has had a [facility] admission record on file within the last 365 days.    Current Medical Providers:  Patient Care Team:  Carol Ann Sapp APRN as PCP - General (Family Medicine)  Laureano Weathers MD as Consulting Physician (Pulmonary Disease)  Rj Fritz MD as Consulting Physician (Pulmonary Disease)  Geno James APRN as Nurse Practitioner (Nurse Practitioner)  Shaggy Philippe MD as Consulting Physician (Cardiothoracic Surgery)    Outpatient Medications Prior to Visit   Medication Sig Dispense Refill    aspirin 81 MG tablet Take 1 tablet by mouth Daily.      gabapentin (NEURONTIN) 300 MG capsule Take 1 capsule by mouth 3 (Three) Times a Day.      HYDROcodone-acetaminophen (NORCO) 7.5-325 MG per tablet Take 1 tablet by mouth Every 12 (Twelve) Hours As Needed for Moderate Pain.      naloxone (NARCAN) 4 MG/0.1ML nasal spray 1 spray into the nostril(s) as directed by provider As Needed (opioid overdose). For Hydrocodone prescription      amLODIPine (NORVASC) 5 MG tablet Take 1 tablet by mouth Daily. 90 tablet 1    citalopram (CeleXA) 20 MG tablet Take 1 tablet by mouth Daily. 90 tablet 1    metFORMIN (GLUCOPHAGE) 500 MG tablet Take 1 tablet by mouth Daily With Breakfast. 90 tablet 1    metoprolol tartrate (LOPRESSOR) 50 MG tablet Take 1 tablet by mouth 2 (Two) Times a Day. 180 tablet  1    pantoprazole (PROTONIX) 20 MG EC tablet Take 1 tablet by mouth Daily. 90 tablet 1    simvastatin (ZOCOR) 40 MG tablet Take 1 tablet by mouth Daily. 90 tablet 1    HYDROcodone-acetaminophen (NORCO) 7.5-325 MG per tablet Take 1 tablet by mouth Every 6 (Six) Hours As Needed for Moderate Pain. (Patient not taking: Reported on 1/10/2024) 48 tablet 0    ibuprofen (ADVIL,MOTRIN) 100 MG/5ML suspension Take 10 mL by mouth Every 6 (Six) Hours As Needed for Mild Pain. (Patient not taking: Reported on 1/10/2024)      ibuprofen (ADVIL,MOTRIN) 800 MG tablet Take 1 tablet by mouth Every 6 (Six) Hours As Needed for Mild Pain. Pt states he takes four 200mg tablets in the morning and three 200mg tablets in the evening (Patient not taking: Reported on 1/10/2024)       Facility-Administered Medications Prior to Visit   Medication Dose Route Frequency Provider Last Rate Last Admin    cyanocobalamin injection 1,000 mcg  1,000 mcg Intramuscular Q28 Days Carol Ann Sapp APRN   1,000 mcg at 12/26/23 1047       Opioid medication/s are on active medication list.  and I have evaluated his active treatment plan and pain score trends (see table).  Vitals:    01/10/24 1051   PainSc: 0-No pain     I have reviewed the chart for potential of high risk medication and harmful drug interactions in the elderly.          Aspirin is on active medication list. Aspirin use is indicated based on review of current medical condition/s. Pros and cons of this therapy have been discussed today. Benefits of this medication outweigh potential harm.  Patient has been encouraged to continue taking this medication.  .      Patient Active Problem List   Diagnosis    Lumbar back pain    Gastroesophageal reflux disease without esophagitis    Essential hypertension    Hyperlipidemia LDL goal <100    Left upper lobe pulmonary nodule    History of primary tuberculosis    Personal history of nicotine dependence    Panlobular emphysema    Cervical paraspinal muscle  "spasm    Annual physical exam    Obstructive sleep apnea    Hilar adenopathy    Adenocarcinoma, lung, right    Lung cancer    Tobacco use     Advance Care Planning   Advance Care Planning     Advance Directive is not on file.  ACP discussion was declined by the patient. Patient does not have an advance directive, declines further assistance.     Objective    Vitals:    01/10/24 1051   BP: 121/57   BP Location: Left arm   Patient Position: Sitting   Cuff Size: Large Adult   Pulse: 66   Temp: 98 °F (36.7 °C)   SpO2: 96%   Weight: 106 kg (233 lb 3.2 oz)   Height: 188 cm (74\")   PainSc: 0-No pain     Estimated body mass index is 29.94 kg/m² as calculated from the following:    Height as of this encounter: 188 cm (74\").    Weight as of this encounter: 106 kg (233 lb 3.2 oz).           Does the patient have evidence of cognitive impairment?   No    Lab Results   Component Value Date    CHLPL 143 2024    TRIG 140 2024    HDL 42 2024    LDL 76 2024    VLDL 25 2024    HGBA1C 6.3 (H) 2024          HEALTH RISK ASSESSMENT    Smoking Status:  Social History     Tobacco Use   Smoking Status Every Day    Packs/day: 1.00    Years: 53.00    Additional pack years: 0.00    Total pack years: 53.00    Types: Cigarettes    Start date:     Passive exposure: Never   Smokeless Tobacco Never     Alcohol Consumption:  Social History     Substance and Sexual Activity   Alcohol Use Not Currently    Alcohol/week: 42.0 - 56.0 standard drinks of alcohol    Types: 42 - 56 Cans of beer per week    Comment: 6-8 beers per day     Fall Risk Screen:    STEADI Fall Risk Assessment was completed, and patient is at LOW risk for falls.Assessment completed on:1/10/2024    Depression Screenin/10/2024    10:47 AM   PHQ-2/PHQ-9 Depression Screening   Little Interest or Pleasure in Doing Things 0-->not at all   Feeling Down, Depressed or Hopeless 0-->not at all   PHQ-9: Brief Depression Severity Measure Score 0 "       Health Habits and Functional and Cognitive Screenin/10/2024    10:47 AM   Functional & Cognitive Status   Do you have difficulty preparing food and eating? No   Do you have difficulty bathing yourself, getting dressed or grooming yourself? No   Do you have difficulty using the toilet? No   Do you have difficulty moving around from place to place? No   Do you have trouble with steps or getting out of a bed or a chair? No   Current Diet Unhealthy Diet   Dental Exam Not up to date   Eye Exam Not up to date   Exercise (times per week) 0 times per week   Current Exercises Include No Regular Exercise   Do you need help using the phone?  No   Are you deaf or do you have serious difficulty hearing?  No   Do you need help to go to places out of walking distance? No   Do you need help shopping? No   Do you need help preparing meals?  No   Do you need help with housework?  No   Do you need help with laundry? No   Do you need help taking your medications? No   Do you need help managing money? No   Do you ever drive or ride in a car without wearing a seat belt? No   Have you felt unusual stress, anger or loneliness in the last month? No   Who do you live with? Spouse   If you need help, do you have trouble finding someone available to you? No   Have you been bothered in the last four weeks by sexual problems? No   Do you have difficulty concentrating, remembering or making decisions? No       Age-appropriate Screening Schedule:  Refer to the list below for future screening recommendations based on patient's age, sex and/or medical conditions. Orders for these recommended tests are listed in the plan section. The patient has been provided with a written plan.    Health Maintenance   Topic Date Due    TDAP/TD VACCINES (1 - Tdap) Never done    ZOSTER VACCINE (1 of 2) Never done    DIABETIC FOOT EXAM  Never done    DIABETIC EYE EXAM  Never done    AAA SCREEN (ONE-TIME)  Never done    COVID-19 Vaccine (1) 2024  (Originally 1955)    INFLUENZA VACCINE  03/31/2024 (Originally 8/1/2023)    Pneumococcal Vaccine 65+ (1 of 2 - PCV) 01/10/2025 (Originally 4/12/1961)    BMI FOLLOWUP  06/30/2024    HEMOGLOBIN A1C  07/02/2024    LIPID PANEL  01/02/2025    URINE MICROALBUMIN  01/02/2025    ANNUAL WELLNESS VISIT  01/10/2025    COLORECTAL CANCER SCREENING  05/03/2026    HEPATITIS C SCREENING  Completed    LUNG CANCER SCREENING  Discontinued                Patient has been erroneously marked as diabetic. Based on the available clinical information, he does not have diabetes and should therefore be excluded from diabetic health maintenance and quality measures for the remainder of the reporting period.     CMS Preventative Services Quick Reference  Risk Factors Identified During Encounter:    Immunizations Discussed/Encouraged: Tdap and Shingrix  Polypharmacy: Medication List reviewed and Medications are appropriate for patient  Tobacco Use/Dependance Risk (use dotphrase .tobaccocessation for documentation)    The above risks/problems have been discussed with the patient.  Pertinent information has been shared with the patient in the After Visit Summary.    Rupa Verma  reports that he has been smoking cigarettes. He started smoking about 54 years ago. He has a 53.00 pack-year smoking history. He has never been exposed to tobacco smoke. He has never used smokeless tobacco.. I have educated him on the risk of diseases from using tobacco products such as cancer, COPD, heart disease, and cataracts.     I advised him to quit and he is not willing to quit.    I spent 3  minutes counseling the patient.          Diagnoses and all orders for this visit:    1. Medicare annual wellness visit, subsequent (Primary)    2. Essential hypertension  -     amLODIPine (NORVASC) 5 MG tablet; Take 1 tablet by mouth Daily.  Dispense: 90 tablet; Refill: 1  -     metoprolol tartrate (LOPRESSOR) 50 MG tablet; Take 1 tablet by mouth 2 (Two) Times a Day.   Dispense: 180 tablet; Refill: 1    3. Other depression  -     citalopram (CeleXA) 20 MG tablet; Take 1 tablet by mouth Daily.  Dispense: 90 tablet; Refill: 1    4. Prediabetes  -     metFORMIN (GLUCOPHAGE) 500 MG tablet; Take 1 tablet by mouth Daily With Breakfast.  Dispense: 90 tablet; Refill: 1    5. Gastroesophageal reflux disease, unspecified whether esophagitis present  -     pantoprazole (PROTONIX) 20 MG EC tablet; Take 1 tablet by mouth Daily.  Dispense: 90 tablet; Refill: 1    6. Hyperlipidemia LDL goal <100  -     simvastatin (ZOCOR) 40 MG tablet; Take 1 tablet by mouth Daily.  Dispense: 90 tablet; Refill: 1    7. Personal history of nicotine dependence    8. Overweight with body mass index (BMI) of 29 to 29.9 in adult    Patient encouraged to partake of healthy low carb diet rich in fresh vegetables and lean proteins.  Patient encouraged to participate in daily exercise with goal of 30 min sustained activity.    Follow Up:   Next Medicare Wellness visit to be scheduled in 1 year.      An After Visit Summary and PPPS were made available to the patient.

## 2024-02-08 DIAGNOSIS — M54.50 CHRONIC MIDLINE LOW BACK PAIN WITHOUT SCIATICA: ICD-10-CM

## 2024-02-08 DIAGNOSIS — Z02.89 PAIN MEDICATION AGREEMENT SIGNED: ICD-10-CM

## 2024-02-08 DIAGNOSIS — G89.29 CHRONIC MIDLINE LOW BACK PAIN WITHOUT SCIATICA: ICD-10-CM

## 2024-02-09 RX ORDER — HYDROCODONE BITARTRATE AND ACETAMINOPHEN 7.5; 325 MG/1; MG/1
1 TABLET ORAL 2 TIMES DAILY PRN
Qty: 60 TABLET | Refills: 0 | Status: SHIPPED | OUTPATIENT
Start: 2024-02-12 | End: 2024-03-13

## 2024-02-11 NOTE — PROGRESS NOTES
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  He underwent robotic RUL wedge resection on 7/21/23 (7 months) and has opted for active surveillance.  He is here alone.     I have reviewed the HPI and verified with the patient the accuracy of it. No changes to interval history since the information was documented. Harshal Wells MD 02/19/24      Diagnostic abnormalities:  --medical history includes tobacco use/abuse (over 50 pack years-smoking since age 15--up to 1 pack/day), COPD, emphysema, tuberculosis diagnosed approximately 11 years ago fully treated through the health department and HYUN who presented with worsening shortness of breath and cough.  -- 6/5/2023-CT chest wo contrast-comparison with CT chest, 10/7/2022-PET scan 10/18/2022-Interval increase in size of a spiculated nodule in the right upper lobe, currently measuring 1.6 cm, compared with 10 mm on the previous chest CT and PET/CT. This is suspicious for pulmonary neoplasm and tissue diagnosis is recommended. The nodule is relatively peripheral, may possibly be amenable to navigational bronchoscopy and biopsy, versus CT-guided biopsy. No evidence of intrathoracic lymphadenopathy.  --6/26/23- CT chest wo contrast-1.7 cm spiculated RIGHT upper lobe pulmonary nodule, highly suspicious for neoplasm (increase in size from 10/8/2022 when it measured 10 mm).  No evidence of metastatic disease in chest.  --6/26/23- Flexible fiberoptic  bronchoscopy Bronchoscopy, Diagnostic, Robotic guidance, Transbronchial biopsy, Endobronchial ultrasound, Transbronchial needle aspirate, and transbronchial needle pulmonary aspirate, and radial probe ultrasound by Dr. Fritz.  Final diagnosis: 1.  Lung, right upper lobe, fine-needle aspiration: Adenocarcinoma. 2.  Lymph node, station 11 R, EBUS: Negative for malignant cells. Polymorphous lymphocyte population. 3.  Lung, bronchial washing: Negative for malignant cells. Benign bronchial cells, squamous cells, and macrophages.  --7/13/23-consult by CT surgery, Dr. Philippe.  Most recent CT scan of chest, 6/26/2023 and PET scan from 10/18/2022, as well as PFTs and bronchoscopic biopsy findings on 6/26/2023 reviewed.  Preop stage: T1b N0 M0, stage I A2. Recommend surgical resection, his nodule is borderline less than 2 cm, but in some views looks to be about 2.1. Discuss treatment alternatives of stereotactic radiation and wedge resection if his pulmonary reserve is not adequate to tolerate lobectomy. He did not want to consider stereotactic radiation, he would rather have surgery. Plan on full PFT and if those are adequate, proceed with robotic right upper lobectomy with mediastinal lymph node dissection. PFT since clinic visit:  He has hypoxia on room air (O2 Sat 88%) and DLCO of 44%, given this not a candidate for anatomic lobectomy. Will proceed with wedge resection and lymph node dissection.   --7/23/23-glucose 122, sodium 135, calcium 8.3 otherwise normal BMP with a GFR of 76.9.  Hemoglobin 13.7, hematocrit 44.8, .4, platelets 188,000, WBC 14.47 (no differential).  --8/21/23- Postoperatively the patient is seen for adjuvant management considerations.    Previous interventions:  --7/21/23- 1.  Lung, right upper lobe wedge resection:  A.  Moderately differentiated lung adenocarcinoma, papillary predominate.  B.  Invasive carcinoma measures 1.8 cm in greatest dimension grossly.  C.  Surgical excision margins are  negative for evidence of malignancy.  D.  Negative for evidence of pleural invasion.     2.  Lymph node, level 7 biopsy: Benign lymph node tissue.     3.  Lymph node, level 10 biopsy: Benign lymph node tissue.     4.  Lymph node, level 4R biopsy: Benign lymph node tissue.     5.  Lymph node, level 9 biopsy: Benign lymph node tissue.     AJCC STAGE: pT1b, pN0, pMx    Synoptic Checklist   LUNG   8th Edition - Protocol posted: 9/21/2022LUNG: RESECTION - All Specimens  SPECIMEN   Procedure  Wedge resection   Specimen Laterality  Right   TUMOR   Tumor Focality  Single focus   Tumor Site  Upper lobe of lung   Tumor Size     Total Tumor Size (size of entire tumor)  Greatest Dimension (Centimeters): 1.8 cm   Histologic Type  Invasive papillary adenocarcinoma   Histologic Patterns Present  Papillary     Solid     Micropapillary   Histologic Grade  G2, moderately differentiated   Spread Through Air Spaces (CESAR)  Not identified   Visceral Pleura Invasion  Not identified   Direct Invasion of Adjacent Structures  Not applicable (no adjacent structures present)   Treatment Effect  No known presurgical therapy   Lymphovascular Invasion  Not identified   MARGINS   Margin Status for Invasive Carcinoma  All margins negative for invasive carcinoma   Closest Margin(s) to Invasive Carcinoma  Parenchymal   Distance from Invasive Carcinoma to Closest Margin  0.8 cm   Margin Status for Non-Invasive Tumor  Not applicable   REGIONAL LYMPH NODES   Lymph Node(s) from Prior Procedures  No known prior lymph node sampling performed   Regional Lymph Node Status  All regional lymph nodes negative for tumor   Number of Lymph Nodes Examined  At least: 4   [place] Site(s) Examined  4R: Lower paratracheal     9R: Pulmonary ligament     10R: Hilar     7: Subcarinal   PATHOLOGIC STAGE CLASSIFICATION (pTNM, AJCC 8th Edition)   Reporting of pT, pN, and (when applicable) pM categories is based on information available to the pathologist at the time the  report is issued. As per the AJCC (Chapter 1, 8th Ed.) it is the managing physician’s responsibility to establish the final pathologic stage based upon all pertinent information, including but potentially not limited to this pathology report.   pT Category  pT1b   pN Category  pN0            LABS    Lab Results - Last 18 Months   Lab Units 01/02/24  0956 08/21/23  1208 07/22/23  0415 07/21/23  1112 07/19/23  1123 06/19/23  1304 12/29/22  0838   HEMOGLOBIN g/dL 16.7 15.4 13.7 15.6 16.8 15.7 17.0   HEMATOCRIT % 48.3 46.9 44.8 47.9 51.3* 48.7 50.5   MCV fL 94 93.6 100.4* 96.4 96.1 96.1 94   WBC x10E3/uL 8.9 11.10* 14.47* 10.39 9.75 9.56 10.3   RDW % 13.3 13.2 13.1 13.0 12.8 13.2 12.7   MPV fL  --  10.1 10.6 10.2 11.0 10.4  --    PLATELETS x10E3/uL 266 245 188 209 235 226 278   IMM GRAN % %  --  0.7*  --   --  0.9*  --   --    NEUTROS ABS x10E3/uL 6.7 8.29*  --   --  7.41*  --  7.6*   LYMPHS ABS x10E3/uL 1.1 1.53  --   --  1.22  --  1.3   MONOS ABS x10E3/uL 0.9 0.88  --   --  0.79  --  1.0*   EOS ABS x10E3/uL 0.1 0.21  --   --  0.16  --  0.2   BASOS ABS x10E3/uL 0.1 0.11  --   --  0.08  --  0.1   IMMATURE GRANS (ABS) 10*3/mm3  --  0.08*  --   --  0.09*  --   --    NRBC /100 WBC  --  0.0  --   --  0.0  --   --        Lab Results - Last 18 Months   Lab Units 02/13/24  0957 01/02/24  0956 08/21/23  1208 07/23/23  1056 07/22/23  0415 07/21/23  1112 07/19/23  1123 07/14/23  0728 06/19/23  1304 12/29/22  0838   0000   GLUCOSE mg/dL  --  133* 121* 122* 161* 179* 104*  --  110* 131*   < >   SODIUM mmol/L  --  137 138 135* 137 137 137  --  137 137   < >   SODIUM, ARTERIAL   --   --   --   --   --   --   --    < >  --   --   --    POTASSIUM mmol/L  --  4.6 4.6 4.5 4.7 4.9 4.4  --  4.2 4.9   < >   CO2 mmol/L  --  23 25.0 22.0 20.0* 21.0* 24.0  --  24.0 24   < >   CHLORIDE mmol/L  --  99 102 100 100 102 101  --  100 101   < >   ANION GAP mmol/L  --   --  11.0 13.0 17.0* 14.0 12.0  --  13.0  --   --    CREATININE mg/dL 0.90 0.81  "0.72* 1.06 1.41* 1.10 0.72*  --  0.76 0.93   < >   BUN mg/dL  --  9 10 18 20 11 10  --  12 15   < >   BUN / CREAT RATIO   --  11 13.9 17.0 14.2 10.0 13.9  --  15.8 16   < >   CALCIUM mg/dL  --  9.3 9.6 8.3* 8.3* 8.7 9.4  --  9.3 9.5   < >   ALK PHOS IU/L  --  81 88  --   --   --  78  --   --  74  --    TOTAL PROTEIN g/dL  --   --  7.3  --   --   --  7.2  --   --   --   --    ALT (SGPT) IU/L  --  40 41  --   --   --  45*  --   --  45*  --    AST (SGOT) IU/L  --  37 40  --   --   --  42*  --   --  38  --    BILIRUBIN mg/dL  --  0.5 0.5  --   --   --  0.5  --   --  0.6  --    ALBUMIN g/dL  --  4.3 4.5  --   --   --  4.5  --   --  4.3  --    GLOBULIN gm/dL  --   --  2.8  --   --   --  2.7  --   --   --   --     < > = values in this interval not displayed.       No results for input(s): \"MSPIKE\", \"KAPPALAMB\", \"IGLFLC\", \"URICACID\", \"FREEKAPPAL\", \"CEA\", \"LDH\", \"REFLABREPO\" in the last 84844 hours.    Lab Results - Last 18 Months   Lab Units 01/02/24  0956 08/21/23  1208 12/29/22  0838   IRON mcg/dL  --  83  --    TIBC mcg/dL  --  361  --    IRON SATURATION (TSAT) %  --  23  --    FERRITIN ng/mL  --  309.20  --    T4 TOTAL ug/dL 6.2  --  7.9   TSH uIU/mL 0.862  --  1.150   FOLATE ng/mL  --  9.95  --          PAST MEDICAL HISTORY:  ALLERGIES:  No Known Allergies  CURRENT MEDICATIONS:  Outpatient Encounter Medications as of 2/19/2024   Medication Sig Dispense Refill    amLODIPine (NORVASC) 5 MG tablet Take 1 tablet by mouth Daily. 90 tablet 1    aspirin 81 MG tablet Take 1 tablet by mouth Daily.      citalopram (CeleXA) 20 MG tablet Take 1 tablet by mouth Daily. 90 tablet 1    gabapentin (NEURONTIN) 300 MG capsule Take 1 capsule by mouth 3 (Three) Times a Day.      HYDROcodone-acetaminophen (NORCO) 7.5-325 MG per tablet Take 1 tablet by mouth Every 12 (Twelve) Hours As Needed for Moderate Pain.      metFORMIN (GLUCOPHAGE) 500 MG tablet Take 1 tablet by mouth Daily With Breakfast. 90 tablet 1    metoprolol tartrate (LOPRESSOR) " 50 MG tablet Take 1 tablet by mouth 2 (Two) Times a Day. 180 tablet 1    naloxone (NARCAN) 4 MG/0.1ML nasal spray 1 spray into the nostril(s) as directed by provider As Needed (opioid overdose). For Hydrocodone prescription      pantoprazole (PROTONIX) 20 MG EC tablet Take 1 tablet by mouth Daily. 90 tablet 1    simvastatin (ZOCOR) 40 MG tablet Take 1 tablet by mouth Daily. 90 tablet 1     Facility-Administered Encounter Medications as of 2/19/2024   Medication Dose Route Frequency Provider Last Rate Last Admin    cyanocobalamin injection 1,000 mcg  1,000 mcg Intramuscular Q28 Days Carol Ann Sapp, APRN   1,000 mcg at 12/26/23 1047    [COMPLETED] iopamidol (ISOVUE-300) 61 % injection 100 mL  100 mL Intravenous Once in imaging Harshal Wells MD   100 mL at 02/13/24 1018     Adult illnesses:  Arthritis  Chronic back pain  Hypertension  Diabetes mellitus  Hyperlipidemia  History of pulmonary tuberculosis  Hypertension  Left upper lobe nodule/adenocarcinoma  History of nicotine dependence  HYNU    Past surgeries:  Bronchoscopy with Ion robotic assisted, 6/26/2023  Cholecystectomy  Colonoscopy  Endoscopy  Right eye cataract surgery  Patella fracture surgery, left  Right upper lobectomy, 7/21/2023.  Right thoracoscopy with da Margy robot wedge resection, mediastinal lymph node dissection, possible lobectomy-Dr. Philippe    ADULT ILLNESSES:  Patient Active Problem List   Diagnosis Code    Lumbar back pain M54.50    Gastroesophageal reflux disease without esophagitis K21.9    Essential hypertension I10    Hyperlipidemia LDL goal <100 E78.5    Left upper lobe pulmonary nodule R91.1    History of primary tuberculosis Z86.11    Personal history of nicotine dependence Z87.891    Panlobular emphysema J43.1    Cervical paraspinal muscle spasm M62.838    Annual physical exam Z00.00    Obstructive sleep apnea G47.33    Hilar adenopathy R59.0    Adenocarcinoma, lung, right C34.91    Lung cancer C34.90    Tobacco use Z72.0      SURGERIES:  Past Surgical History:   Procedure Laterality Date    BRONCHOSCOPY WITH ION ROBOTIC ASSIST N/A 6/26/2023    Procedure: BRONCHOSCOPY WITH ION ROBOT;  Surgeon: Rj Fritz MD;  Location:  [facility] OR;  Service: Robotics - Pulmonary;  Laterality: N/A;  pre lung mass   post lung mass   Carol Ann Sapp    CHOLECYSTECTOMY      COLONOSCOPY      ENDOSCOPY      EYE SURGERY Right     cataract surgery    LOBECTOMY Right 7/21/2023    Procedure: RIGHT THORACOSCOPY WITH DAVINCI ROBOT WITH WEDGE RESECTION, POSSIBLE LOBECTOMY, MEDIASTINAL LYMPH NODE DISSECTION, POSSIBLE LOBECTOMY;  Surgeon: Shaggy Philippe MD;  Location:  [facility] OR;  Service: Robotics - DaVinci;  Laterality: Right;    PATELLA FRACTURE SURGERY Left      HEALTH MAINTENANCE ITEMS:  Health Maintenance Due   Topic Date Due    COVID-19 Vaccine (1) Never done    RSV Vaccine - Adults (1 - 1-dose 60+ series) Never done       <no information>  Last Completed Colonoscopy            COLORECTAL CANCER SCREENING (COLONOSCOPY - Every 10 Years) Next due on 5/3/2026      05/03/2016  COLONOSCOPY (Done)                  Immunization History   Administered Date(s) Administered    Fluzone Quad >6mos (Multi-dose) 10/10/2012    Influenza Quad Vaccine (Inpatient) 10/10/2012     Last Completed Mammogram       This patient has no relevant Health Maintenance data.              FAMILY HISTORY:  Family History   Problem Relation Age of Onset    Hypertension Mother     No Known Problems Father     Diabetes Neg Hx     Cancer Neg Hx     Stroke Neg Hx      SOCIAL HISTORY:  Social History     Socioeconomic History    Marital status:    Tobacco Use    Smoking status: Every Day     Packs/day: 1.00     Years: 53.00     Additional pack years: 0.00     Total pack years: 53.00     Types: Cigarettes     Start date: 1970     Passive exposure: Never    Smokeless tobacco: Never   Vaping Use    Vaping Use: Never used   Substance and Sexual Activity    Alcohol use: Not Currently      "Alcohol/week: 42.0 - 56.0 standard drinks of alcohol     Types: 42 - 56 Cans of beer per week     Comment: 6-8 beers per day    Drug use: No    Sexual activity: Not Currently       REVIEW OF SYSTEMS:  Review of Systems   Constitutional: Negative.  Negative for fatigue and unexpected weight loss.        Manages his ADLs, to include chores, errands and driving.  He is still up and about, \"most of the time.\" Is on disability due to his back issues   HENT:  Positive for postnasal drip.    Eyes: Negative.    Respiratory:  Positive for cough (\"Not different.\"  Mostly dry) and shortness of breath (Baseline exertional dyspnea but denies sob with routine activities).         Current smoker-age 15-present (1 ppd - prior: 1/2 pack/day).  Still no plans to stop.  \"I'm just being honest doc. I got too much going on.\"   Cardiovascular: Negative.    Gastrointestinal: Negative.    Endocrine: Negative.    Genitourinary: Negative.    Musculoskeletal:  Positive for arthralgias (chronic), back pain (chronic) and myalgias (Intermittent nocturnal leg cramps and with ambulation). Negative for neck pain.   Skin: Negative.    Allergic/Immunologic: Negative.    Neurological: Negative.    Hematological: Negative.    Psychiatric/Behavioral: Negative.         /80   Pulse 73   Temp 97.8 °F (36.6 °C) (Temporal)   Resp 18   Ht 188 cm (74\")   Wt 104 kg (229 lb 6.4 oz)   SpO2 97%   BMI 29.45 kg/m²  Body surface area is 2.3 meters squared.  Pain Score    02/19/24 0855   PainSc: 0-No pain           Physical Exam  Vitals and nursing note reviewed.   Constitutional:       Appearance: He is obese.      Comments: Pleasant, cooperative, modestly kept, obese elderly male.  Ambulatory.  ECOG 0.      Has regained 5 lb (had intentionally lost 8 lb at his prior visit) since the last visit.    HENT:      Head: Normocephalic and atraumatic.      Mouth/Throat:      Mouth: Mucous membranes are moist.      Pharynx: Oropharynx is clear.      Comments: " Poor dentition with multiple missing teeth  Eyes:      General: No scleral icterus.     Extraocular Movements: Extraocular movements intact.      Pupils: Pupils are equal, round, and reactive to light.   Cardiovascular:      Rate and Rhythm: Normal rate.   Pulmonary:      Effort: Pulmonary effort is normal.      Comments: Distant breath sounds bilaterally    Healed laparoscopic thoracotomy incisions  Abdominal:      Palpations: Abdomen is soft.      Tenderness: There is no abdominal tenderness.   Musculoskeletal:         General: Normal range of motion.      Cervical back: Normal range of motion.   Lymphadenopathy:      Cervical: No cervical adenopathy.   Skin:     General: Skin is warm.   Neurological:      General: No focal deficit present.      Mental Status: He is alert and oriented to person, place, and time.   Psychiatric:         Mood and Affect: Mood normal.         Behavior: Behavior normal.         Thought Content: Thought content normal.       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 This is suspicious for pulmonary neoplasm and tissue diagnosis is recommended. The nodule is relatively peripheral, may possibly be amenable to navigational bronchoscopy and biopsy, versus CT-guided biopsy. No evidence of intrathoracic lymphadenopathy.    -- Tumor status:  -- 7/21/23- robotic RUL wedge resection, MLND Final diagnosis:  1.8 cm tumor right upper lobe, all regional nodes negative for malignancy (number of nodes: At least 4; 4R lower paratracheal, 9R pulmonary ligament, 10 R, hilar, 7 subcarinal).  Surgical margins: All margins negative for  invasive carcinoma .  --7/31/23- PD-L1- High expression (TPS- 95%)  --8/2/23 - EGFR mutation - NOT detected  --8/3/23 - BRAF mutation -  Detected low level; ALK/ROS1/MET/RET (FISH) - negative  --8/29/23- CT chest- Interval right upper lobe wedge resection with linear scarring and/or granulation suspected in the right upper lobe along the anastomotic line. Very small right pleural effusion. Stable left upper lobe scarring. 3 mm right upper lobe pulmonary nodule can be followed on subsequent imaging. No increasing intrathoracic or axillary lymphadenopathy.  --2/13/24- CT chest- Posttreatment changes in the right upper lobe compatible with the history of wedge resection, there is interval mild regression of granulation tissue or inflammation associated with scar tissue in this region. No residual recurrent mass or suspicious pulmonary nodule is identified. There is stable parenchymal and subpleural scarring at the left upper lobe. Moderate emphysema. Mild stable subcarinal lymphadenopathy. Trace right pleural effusion is present, some of the fluid tracks  into the major fissure. Heavy calcified plaque is noted within the coronary arteries. There  is also heavy calcified plaque at the origin of the left common carotid artery, this appears to contribute to approximately 50% stenosis of the vessel.    2.  Emphysema  3.  Nicotine dependence.  Continues to smoke 1 ppd - 1 pack/day since age 15 (>50 pack year smoker)  4.  History of TB  5.  Diabetes  6.  Beer use/excess- 6-8 cans/day since age 22  7.  Mild macrocytic anemia.  Contribution from malignancy/anemia of chronic disease  --Resolved.  Hgb 16; MCV 98, 2/8/24 (prior: Hgb 13.7-17)  8.   Degenerative changes hips, pubic symphisis, SI joint and spondylosis is noted in the lumbar spine   --Xray pelvis and lumbar spine, 10/11/23  9.   Self directed    Plan:  Review labs from 2/8/24.  Normal CBC, CEA 4.2, pending CMP, repleted iron/B12/folate levels  Reviewed CT chest,  "2/13/2024 (above).  SALAZAR.  No residual or recurrent mass or suspicious pulmonary nodule.  Heavy calcified plaque within the coronary stent to left common carotid artery.  Refer to vascular surgery Re: IV calcified plaque left common carotid  Review and discuss the NCCN guidelines version 3.2023 non-small cell lung cancer after definitive surgical intervention: Stage IA with negative margins-adjuvant treatment: Observation.  Stage IB (T2a, M0)-margins negative (R0)-observe or chemotherapy for high risk patients (poorly differentiated tumors/neuroendocrine tumors, vascular invasion, wedge resection, tumors> 4 cm, visceral pleural involvement, and unknown lymph node status).  Surveillance: Stage 1-2 (primary treatment included surgery+/-chemotherapy): H&P and chest CT+/-contrast every 6 months for 2-3 years then H&P and low-dose noncontrast enhanced chest CT annually.  Given his continued smoking, and wedge resection we discussed the rationale for adjuvant systemic therapy (chemotherapy) at some length.  Potential toxicities (to include myelosuppression and risk of infection, etc.) also discussed.  Ultimately the patient declined systemic therapy stating that, \"I am not quitting smoking anyway.\"  Prefers active surveillance and, \"we will deal with it if it ever comes back.\"  Schedule CT chest with IV contrast in 15 weeks at [facility].  Compare to previous studies.  Return to office in 16 weeks preoffice CBC and diff, CMP, CEA, iron, fe sat, ferritin, B12, folate.    Importance of Smoking Cessation discussed with patient and informed patient additional information will be on today's [facility]'s Flyer - Plan to Be Tobacco Free handout provided to patient      I spent ~41 minutes caring for Rupa on this date of service. This time includes time spent by me in the following activities: preparing for the visit, reviewing tests, performing a medically appropriate examination and/or evaluation, counseling and " educating the patient/family/caregiver, ordering medications, tests, or procedures and documenting information in the medical record.

## 2024-02-13 ENCOUNTER — HOSPITAL ENCOUNTER (OUTPATIENT)
Dept: CT IMAGING | Facility: HOSPITAL | Age: 69
Discharge: HOME | End: 2024-02-13
Admitting: INTERNAL MEDICINE
Payer: MEDICARE

## 2024-02-13 DIAGNOSIS — C34.91 ADENOCARCINOMA, LUNG, RIGHT: ICD-10-CM

## 2024-02-13 LAB — CREAT BLDA-MCNC: 0.9 MG/DL (ref 0.6–1.3)

## 2024-02-13 PROCEDURE — 71260 CT THORAX DX C+: CPT

## 2024-02-13 PROCEDURE — 25510000001 IOPAMIDOL 61 % SOLUTION: Performed by: INTERNAL MEDICINE

## 2024-02-13 PROCEDURE — 82565 ASSAY OF CREATININE: CPT

## 2024-02-13 RX ADMIN — IOPAMIDOL 100 ML: 612 INJECTION, SOLUTION INTRAVENOUS at 10:18

## 2024-02-15 ENCOUNTER — HOSPITAL ENCOUNTER (OUTPATIENT)
Dept: PAIN MANAGEMENT | Age: 69
Discharge: HOME | End: 2024-02-15
Payer: MEDICARE

## 2024-02-15 VITALS
RESPIRATION RATE: 18 BRPM | OXYGEN SATURATION: 97 % | HEART RATE: 71 BPM | TEMPERATURE: 97.6 F | SYSTOLIC BLOOD PRESSURE: 138 MMHG | DIASTOLIC BLOOD PRESSURE: 61 MMHG

## 2024-02-15 PROCEDURE — 20611 DRAIN/INJ JOINT/BURSA W/US: CPT

## 2024-02-15 PROCEDURE — 6360000002 HC RX W HCPCS

## 2024-02-15 PROCEDURE — 2500000003 HC RX 250 WO HCPCS

## 2024-02-15 RX ORDER — BUPIVACAINE HYDROCHLORIDE 5 MG/ML
1 INJECTION, SOLUTION EPIDURAL; INTRACAUDAL ONCE
Status: DISCONTINUED | OUTPATIENT
Start: 2024-02-15 | End: 2024-02-17 | Stop reason: HOSPADM

## 2024-02-15 RX ORDER — ETHYL CHLORIDE 100 %
AEROSOL, SPRAY (ML) TOPICAL PRN
Status: DISCONTINUED | OUTPATIENT
Start: 2024-02-15 | End: 2024-02-17 | Stop reason: HOSPADM

## 2024-02-15 RX ORDER — LIDOCAINE HYDROCHLORIDE 10 MG/ML
1 INJECTION, SOLUTION EPIDURAL; INFILTRATION; INTRACAUDAL; PERINEURAL ONCE
Status: DISCONTINUED | OUTPATIENT
Start: 2024-02-15 | End: 2024-02-17 | Stop reason: HOSPADM

## 2024-02-15 RX ORDER — TRIAMCINOLONE ACETONIDE 40 MG/ML
40 INJECTION, SUSPENSION INTRA-ARTICULAR; INTRAMUSCULAR ONCE
Status: DISCONTINUED | OUTPATIENT
Start: 2024-02-15 | End: 2024-02-17 | Stop reason: HOSPADM

## 2024-02-15 NOTE — DISCHARGE INSTRUCTIONS
will be numb for a few hours after the procedure    [x] I understand if a steroid was used it will take effect in 3 - 7 days. I understand that as the numbing medication wears off, the pain may return until the steroids start working.    [x] I understand that today I will rest for the next 24 hours and drink plenty of water.    [] For Botox for Migraines please do not wear anything constricting around the forehead for 7-10 days post injection. Examples headband, hats, or bandana    [] For Botox for Spasticity start therapy for the affected limb in two weeks.    [] Additional instructions: ______________________________________________ ___________________________________________________________________    Sedation:  Was oral sedation given?    [] Yes  [x] No    I understand that if I took an oral nerve calming medication I will not drive for  [] 24 hours after taking the medication.    [x] I have received a copy of my discharge instructions and understand the above instructions to the best of my knowledge    Patient Discharged:  [x] Home  [] Hospital  [] Other  ____________________________________________    Via:  [x] Ambulatory  [] Wheelchair   [] Stretcher [] EMS       Accompanied By:   [] Significant other  [] Family Member  [] Friend   [] Hospital Staff  []  Ambulance Staff  [] Other_______________________________________________    Plan:  [] Will return to the office in   [] 1 month   [] 3 months for:  [] Procedure Follow-up  [] Office Visit   [] Planned Procedure      Patient Signature: _____________________________________________________    Staff Signature: _______________________________________________________        If you have questions, problems or concerns you may call [phone] and follow the prompts    DAVID Salazar, [place]-BC

## 2024-02-15 NOTE — PROGRESS NOTES

## 2024-02-15 NOTE — PROCEDURES
[facility] Physical [facility] Pain Medicine    Patient Name: Eufemia Damian    : 1955    Age: 68 y.o.    Sex: male    Date: February 15, 2024    Pre-op Diagnosis: right  Sacroiliac Joint(s) Dysfunction/ Sacroiliitis    Post-op Diagnosis: right  Sacroiliac Joint(s) Dysfunction/ Sacroliliits    Procedure: Ultrasound Guided Injection of  right Sacroiliac Joint(s)     Performing Procedure:  DAVID Salazar    Patient Vitals for the past 24 hrs:   BP Temp Temp src Pulse Resp SpO2   02/15/24 0833 138/61 97.6 °F (36.4 °C) Temporal 71 18 97 %       right Sacroiliac Joint(s)     Description of Procedure:    After voluntary, informed and signed consent obtained the patient was placed in a prone position. Appropriate time out was obtained per policy. The Sacroiliac Joint(s) was palpated for area of maximal tenderness. The area was prepped in an aseptic fashion with CHG swab. The ultrasound transducer was used to confirm the appropriate location. The skin was sprayed with Gebauer's Solution. Under aseptic technique and direct ultrasound visualization a 22 gauge 3 inch spinal needle was introduced into the Sacroiliac Joint(s). Patient was asked if any new pain was radiating down the ipsilateral lower extremity. If patient noted radiating pain the needle was readjusted until radiating pain was gone. After a negative aspiration, a solution of 1 ml of 0.5% Marcaine Plain and 1 ml of 1% Lidocaine Plain and 1 ml of Kenalog (40mg/ml) was injected into the Sacroiliac Joint(s). The needle was withdrawn and a sterile dressing applied. If this was a bilateral procedure, the same steps were followed on the opposite side.     Preop Diagnosis: right Trochanteric Bursitis    Postop Diagnosis: right Trochanteric Bursitis    Procedure: Ultrasound Guided Injection of right Trochanteric Bursa(s)    Performing Procedure:  JANAE Salazar    Patient Vitals for the past 24 hrs:   BP Temp Temp src Pulse Resp SpO2

## 2024-02-16 ENCOUNTER — TELEPHONE (OUTPATIENT)
Dept: VASCULAR SURGERY | Facility: CLINIC | Age: 69
End: 2024-02-16
Payer: MEDICARE

## 2024-02-16 ENCOUNTER — TELEPHONE (OUTPATIENT)
Dept: ONCOLOGY | Facility: CLINIC | Age: 69
End: 2024-02-16
Payer: MEDICARE

## 2024-02-16 DIAGNOSIS — I65.22 CAROTID ARTERY PLAQUE, LEFT: Primary | ICD-10-CM

## 2024-02-19 ENCOUNTER — OFFICE VISIT (OUTPATIENT)
Dept: ONCOLOGY | Facility: CLINIC | Age: 69
End: 2024-02-19
Payer: MEDICARE

## 2024-02-19 VITALS
OXYGEN SATURATION: 97 % | TEMPERATURE: 97.8 F | DIASTOLIC BLOOD PRESSURE: 80 MMHG | RESPIRATION RATE: 18 BRPM | WEIGHT: 229.4 LBS | SYSTOLIC BLOOD PRESSURE: 140 MMHG | HEART RATE: 73 BPM | HEIGHT: 74 IN | BODY MASS INDEX: 29.44 KG/M2

## 2024-02-19 DIAGNOSIS — C34.91 ADENOCARCINOMA, LUNG, RIGHT: Primary | ICD-10-CM

## 2024-02-19 PROCEDURE — 1126F AMNT PAIN NOTED NONE PRSNT: CPT | Performed by: INTERNAL MEDICINE

## 2024-02-19 PROCEDURE — 99215 OFFICE O/P EST HI 40 MIN: CPT | Performed by: INTERNAL MEDICINE

## 2024-02-19 PROCEDURE — 3077F SYST BP >= 140 MM HG: CPT | Performed by: INTERNAL MEDICINE

## 2024-02-19 PROCEDURE — 3079F DIAST BP 80-89 MM HG: CPT | Performed by: INTERNAL MEDICINE

## 2024-02-19 PROCEDURE — 1159F MED LIST DOCD IN RCRD: CPT | Performed by: INTERNAL MEDICINE

## 2024-02-21 ENCOUNTER — CLINICAL SUPPORT (OUTPATIENT)
Dept: FAMILY MEDICINE CLINIC | Facility: CLINIC | Age: 69
End: 2024-02-21
Payer: MEDICARE

## 2024-02-21 DIAGNOSIS — R53.83 FATIGUE, UNSPECIFIED TYPE: Primary | ICD-10-CM

## 2024-02-21 PROCEDURE — 96372 THER/PROPH/DIAG INJ SC/IM: CPT | Performed by: NURSE PRACTITIONER

## 2024-02-21 RX ADMIN — CYANOCOBALAMIN 1000 MCG: 1000 INJECTION, SOLUTION INTRAMUSCULAR; SUBCUTANEOUS at 07:56

## 2024-02-21 NOTE — PROGRESS NOTES
Patient presented self to the office for a B12 injection Administered in left deltoid. Patient tolerated well, refused to wait 15 min

## 2024-02-26 ENCOUNTER — TELEPHONE (OUTPATIENT)
Dept: PAIN MANAGEMENT | Age: 69
End: 2024-02-26

## 2024-03-07 DIAGNOSIS — G89.29 CHRONIC MIDLINE LOW BACK PAIN WITHOUT SCIATICA: ICD-10-CM

## 2024-03-07 DIAGNOSIS — M54.50 CHRONIC MIDLINE LOW BACK PAIN WITHOUT SCIATICA: ICD-10-CM

## 2024-03-07 DIAGNOSIS — Z02.89 PAIN MEDICATION AGREEMENT SIGNED: ICD-10-CM

## 2024-03-11 ENCOUNTER — TELEPHONE (OUTPATIENT)
Dept: VASCULAR SURGERY | Facility: CLINIC | Age: 69
End: 2024-03-11
Payer: MEDICARE

## 2024-03-12 ENCOUNTER — OFFICE VISIT (OUTPATIENT)
Dept: VASCULAR SURGERY | Facility: CLINIC | Age: 69
End: 2024-03-12
Payer: MEDICARE

## 2024-03-12 VITALS
OXYGEN SATURATION: 98 % | BODY MASS INDEX: 27.98 KG/M2 | HEIGHT: 74 IN | WEIGHT: 218 LBS | SYSTOLIC BLOOD PRESSURE: 156 MMHG | HEART RATE: 67 BPM | DIASTOLIC BLOOD PRESSURE: 80 MMHG

## 2024-03-12 DIAGNOSIS — I10 ESSENTIAL HYPERTENSION: ICD-10-CM

## 2024-03-12 DIAGNOSIS — I65.23 BILATERAL CAROTID ARTERY STENOSIS: Primary | ICD-10-CM

## 2024-03-12 DIAGNOSIS — I73.9 PAD (PERIPHERAL ARTERY DISEASE): ICD-10-CM

## 2024-03-12 DIAGNOSIS — Z72.0 TOBACCO USE: ICD-10-CM

## 2024-03-12 DIAGNOSIS — E78.5 HYPERLIPIDEMIA LDL GOAL <100: ICD-10-CM

## 2024-03-12 PROCEDURE — 3079F DIAST BP 80-89 MM HG: CPT | Performed by: SURGERY

## 2024-03-12 PROCEDURE — 3077F SYST BP >= 140 MM HG: CPT | Performed by: SURGERY

## 2024-03-12 PROCEDURE — 1160F RVW MEDS BY RX/DR IN RCRD: CPT | Performed by: SURGERY

## 2024-03-12 PROCEDURE — 99204 OFFICE O/P NEW MOD 45 MIN: CPT | Performed by: SURGERY

## 2024-03-12 PROCEDURE — 1159F MED LIST DOCD IN RCRD: CPT | Performed by: SURGERY

## 2024-03-12 RX ORDER — HYDROCODONE BITARTRATE AND ACETAMINOPHEN 7.5; 325 MG/1; MG/1
1 TABLET ORAL 2 TIMES DAILY PRN
Qty: 60 TABLET | Refills: 0 | Status: SHIPPED | OUTPATIENT
Start: 2024-03-13 | End: 2024-04-12

## 2024-03-12 NOTE — LETTER
March 12, 2024     Harshal Wells MD  [address]  [address]  [place] [address]    Patient: Rupa Verma   YOB: 1955   Date of Visit: 3/12/2024     Dear Harshal Wells MD:       Thank you for referring Rupa Verma to me for evaluation. Below are the relevant portions of my assessment and plan of care.    If you have questions, please do not hesitate to call me. I look forward to following Rupa along with you.         Sincerely,        Neel Parnell,         CC: ARIAN Jackman Griffin K, DO  03/12/24 0947  Sign when Signing Visit  03/12/2024      Harshal Wells MD  [address]  [facility] [address]  [place] [address]    Rupa Verma  1955    Chief Complaint   Patient presents with   • NEW PATIENT     Referred from [place] for left carotid artery plaque. Testing done 2/13/24. Patient denies any stroke type symptoms. Patient is a current smoker, since age 15.        Dear Harshal Wells MD    HPI  I had the pleasure of seeing your patient Rupa Verma in the office today.  Thank you kindly for this consultation.  As you recall, Rupa Verma is a 68 y.o.  male who you are currently following for lung cancer.  On recent testing, he was noted to have carotid plaque.  He denies any strokelike symptoms. He is maintained on aspirin and Zocor. He does have some cramping when he walks. He is a current daily smoker, since age 15.  He did have noninvasive testing performed, which I did review in office.        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   • Hypertension    • Left upper lobe pulmonary nodule 09/12/2019       Past Surgical History:   Procedure Laterality Date   • BRONCHOSCOPY WITH ION ROBOTIC ASSIST N/A 6/26/2023    Procedure: BRONCHOSCOPY  WITH ION ROBOT;  Surgeon: Rj Fritz MD;  Location:  [facility] OR;  Service: Robotics - Pulmonary;  Laterality: N/A;  pre lung mass   post lung mass   Carol Ann Sapp   • CHOLECYSTECTOMY     • COLONOSCOPY     • ENDOSCOPY     • EYE SURGERY Right     cataract surgery   • LOBECTOMY Right 7/21/2023    Procedure: RIGHT THORACOSCOPY WITH Perfect EarthINCI ROBOT WITH WEDGE RESECTION, POSSIBLE LOBECTOMY, MEDIASTINAL LYMPH NODE DISSECTION, POSSIBLE LOBECTOMY;  Surgeon: Shaggy Philippe MD;  Location:  [facility] OR;  Service: Robotics - DaVinci;  Laterality: Right;   • PATELLA FRACTURE SURGERY Left        Family History   Problem Relation Age of Onset   • Hypertension Mother    • No Known Problems Father    • Diabetes Neg Hx    • Cancer Neg Hx    • Stroke Neg Hx        Social History     Socioeconomic History   • Marital status:    Tobacco Use   • Smoking status: Every Day     Current packs/day: 1.00     Average packs/day: 1 pack/day for 54.2 years (54.2 ttl pk-yrs)     Types: Cigarettes     Start date: 1970     Passive exposure: Never   • Smokeless tobacco: Never   Vaping Use   • Vaping status: Never Used   Substance and Sexual Activity   • Alcohol use: Not Currently     Alcohol/week: 42.0 - 56.0 standard drinks of alcohol     Types: 42 - 56 Cans of beer per week     Comment: 6-8 beers per day   • Drug use: No   • Sexual activity: Not Currently       No Known Allergies      Current Outpatient Medications:   •  amLODIPine (NORVASC) 5 MG tablet, Take 1 tablet by mouth Daily., Disp: 90 tablet, Rfl: 1  •  aspirin 81 MG tablet, Take 1 tablet by mouth Daily., Disp: , Rfl:   •  citalopram (CeleXA) 20 MG tablet, Take 1 tablet by mouth Daily., Disp: 90 tablet, Rfl: 1  •  gabapentin (NEURONTIN) 300 MG capsule, Take 1 capsule by mouth 3 (Three) Times a Day., Disp: , Rfl:   •  HYDROcodone-acetaminophen (NORCO) 7.5-325 MG per tablet, Take 1 tablet by mouth Every 12 (Twelve) Hours As Needed for Moderate Pain., Disp: , Rfl:   •  metFORMIN  "(GLUCOPHAGE) 500 MG tablet, Take 1 tablet by mouth Daily With Breakfast., Disp: 90 tablet, Rfl: 1  •  metoprolol tartrate (LOPRESSOR) 50 MG tablet, Take 1 tablet by mouth 2 (Two) Times a Day., Disp: 180 tablet, Rfl: 1  •  naloxone (NARCAN) 4 MG/0.1ML nasal spray, 1 spray into the nostril(s) as directed by provider As Needed (opioid overdose). For Hydrocodone prescription, Disp: , Rfl:   •  pantoprazole (PROTONIX) 20 MG EC tablet, Take 1 tablet by mouth Daily., Disp: 90 tablet, Rfl: 1  •  simvastatin (ZOCOR) 40 MG tablet, Take 1 tablet by mouth Daily., Disp: 90 tablet, Rfl: 1    Current Facility-Administered Medications:   •  cyanocobalamin injection 1,000 mcg, 1,000 mcg, Intramuscular, Q28 Days, Carol Ann Sapp, APRN, 1,000 mcg at 02/21/24 0756    Review of Systems   Constitutional: Negative.    HENT: Negative.     Eyes: Negative.    Respiratory: Negative.     Cardiovascular: Negative.         Claudication   Gastrointestinal: Negative.    Endocrine: Negative.    Genitourinary: Negative.    Musculoskeletal: Negative.    Skin: Negative.    Allergic/Immunologic: Negative.    Neurological: Negative.    Hematological: Negative.    Psychiatric/Behavioral: Negative.     All other systems reviewed and are negative.    /80   Pulse 67   Ht 188 cm (74\")   Wt 98.9 kg (218 lb)   SpO2 98%   BMI 27.99 kg/m²     Physical Exam  Vitals and nursing note reviewed.   Constitutional:       Appearance: Normal appearance. He is well-developed.   HENT:      Head: Normocephalic and atraumatic.   Eyes:      General: No scleral icterus.     Pupils: Pupils are equal, round, and reactive to light.   Neck:      Thyroid: No thyromegaly.      Vascular: No carotid bruit or JVD.   Cardiovascular:      Rate and Rhythm: Normal rate and regular rhythm.      Pulses:           Carotid pulses are 2+ on the right side and 2+ on the left side.       Femoral pulses are 2+ on the right side and 2+ on the left side.       Popliteal pulses are 2+ " on the right side and 2+ on the left side.        Dorsalis pedis pulses are 2+ on the right side and 2+ on the left side.        Posterior tibial pulses are 2+ on the right side and 2+ on the left side.      Heart sounds: Normal heart sounds.   Pulmonary:      Effort: Pulmonary effort is normal.      Breath sounds: Normal breath sounds.   Abdominal:      General: Bowel sounds are normal. There is no distension or abdominal bruit.      Palpations: Abdomen is soft. There is no mass.      Tenderness: There is no abdominal tenderness.   Musculoskeletal:         General: Normal range of motion.      Cervical back: Neck supple.   Lymphadenopathy:      Cervical: No cervical adenopathy.   Skin:     General: Skin is warm and dry.   Neurological:      Mental Status: He is alert and oriented to person, place, and time.      Cranial Nerves: No cranial nerve deficit.      Sensory: No sensory deficit.         CT Chest With Contrast Diagnostic    Result Date: 2/13/2024  Narrative: EXAMINATION: CT CHEST W CONTRAST DIAGNOSTIC- 2/13/2024 10:37 AM  HISTORY: Lung Cancer; C34.91-Malignant neoplasm of unspecified part of right bronchus or lung  TOTAL DOSE: 430.54 mGy.cm (Automatic exposure control technique was implemented in an effort to keep the radiation dose as low as possible without compromising image quality)  REPORT: Spiral CT of the chest was performed after administration of intravenous contrast from the thoracic inlet through the upper abdomen. Reconstructed coronal and sagittal images were also reviewed.  Comparison: CT chest with contrast 8/29/2023.  Review of lung windows demonstrates stable subpleural scarring at the apices and there are posttreatment changes in the right upper lobe compatible with the history of wedge resection, the inflammation associated with the surgical site appears to have regressed somewhat and there is well-defined scar tissue in this region, without a recurrent mass. There is a stable area of  suspected parenchymal scarring in the left upper lobe, best seen on images 105 through 111 of series 5. There is trace right pleural effusion, some of the fluid tracks into the major fissure. There is mild basilar atelectasis greater on the right. No suspicious pulmonary nodule is identified. The airways are patent. There is no pneumothorax. Stable interstitial scarring is noted along the surface of the right middle lobe. There is mild to moderate diffuse centrilobular emphysema. Review of soft tissue windows shows normal appearance of the visualized thyroid gland. No enlarged lymph nodes are seen in the axilla. Normal sized lymph nodes are present at the right hilum. There is mild subcarinal lymphadenopathy which is stable, with a maximum short axis diameter of 12 mm. Heart size is normal. There is heavy calcified plaque within the coronary arteries. The thoracic aorta is normal in caliber. Moderate calcified plaque is noted at the origin of the left common carotid artery, this appears to correspond with approximately 50% stenosis based on 2D measurements. The visualized upper abdomen demonstrates decreased attenuation of the liver parenchyma suggestive of steatosis. Cholecystectomy clips are present. There are several cysts within the kidneys. Adrenal glands appear unremarkable. Review of bone windows is unremarkable.      Impression: 1. Posttreatment changes in the right upper lobe compatible with the history of wedge resection, there is interval mild regression of granulation tissue or inflammation associated with scar tissue in this region. No residual recurrent mass or suspicious pulmonary nodule is identified. There is stable parenchymal and subpleural scarring at the left upper lobe. Moderate emphysema. 2. Mild stable subcarinal lymphadenopathy. 3. Trace right pleural effusion is present, some of the fluid tracks into the major fissure. 4. Heavy calcified plaque is noted within the coronary arteries. There  is also heavy calcified plaque at the origin of the left common carotid artery, this appears to contribute to approximately 50% stenosis of the vessel.  This report was signed and finalized on 2/13/2024 10:50 AM by Dr. Barry Hill MD.        Patient Active Problem List   Diagnosis   • Lumbar back pain   • Gastroesophageal reflux disease without esophagitis   • Essential hypertension   • Hyperlipidemia LDL goal <100   • Left upper lobe pulmonary nodule   • History of primary tuberculosis   • Personal history of nicotine dependence   • Panlobular emphysema   • Cervical paraspinal muscle spasm   • Annual physical exam   • Obstructive sleep apnea   • Hilar adenopathy   • Adenocarcinoma, lung, right   • Lung cancer   • Tobacco use        Diagnosis Plan   1. Bilateral carotid artery stenosis  US Carotid Bilateral      2. PAD (peripheral artery disease)  US Ankle / Brachial Indices Extremity Complete      3. Essential hypertension        4. Hyperlipidemia LDL goal <100        5. Tobacco use            Plan: After thoroughly evaluating Rupa Verma, I believe the best course of action is to proceed with further imaging including a carotid duplex and ABIs.  I did review testing which did note some heavy calcified plaque in the origin of the left common carotid artery however unable to visualize past that.  We will see him back in about 2 weeks to discuss testing.  Will also order ABIs due to some complaints of claudication. I did discuss vascular risk factors as they pertain to the progression of vascular disease including controlling his hypertension, hyperlipidemia, and smoking cessation.  His blood pressure is elevated in office today at 156/80.  He should continue on his aspirin 81 mg daily and Zocor 40 mg daily in addition to his other medications.  Unfortunately, he is a current daily smoker and does not wish to quit smoking at this time.  The patient is to continue taking their medications as previously  discussed.   This was all discussed in full with complete understanding.  Thank you for allowing me to participate in the care of your patient.  Please do not hesitate to call with any questions or concerns.  We will keep you aware of any further encounters with Rupa Verma.        Sincerely yours,         Neel Parnell, Carol Ann Cardona APRN

## 2024-03-12 NOTE — PROGRESS NOTES
03/12/2024      Harshal Wells MD  [address]  [address]  [place] [address]    Rupa Verma  1955    Chief Complaint   Patient presents with    NEW PATIENT     Referred from Wells for left carotid artery plaque. Testing done 2/13/24. Patient denies any stroke type symptoms. Patient is a current smoker, since age 15.        Dear Harshal Wells MD    HPI  I had the pleasure of seeing your patient Rupa Verma in the office today.  Thank you kindly for this consultation.  As you recall, Rupa Verma is a 68 y.o.  male who you are currently following for lung cancer.  On recent testing, he was noted to have carotid plaque.  He denies any strokelike symptoms. He is maintained on aspirin and Zocor. He does have some cramping when he walks. He is a current daily smoker, since age 15.  He did have noninvasive testing performed, which I did review in office.        Past Medical History:   Diagnosis Date    Anxiety     Arthritis     Back pain     Diabetes mellitus     borderline, on PO meds    Elevated cholesterol     GERD (gastroesophageal reflux disease)     History of primary tuberculosis 09/12/2019    Hypertension     Left upper lobe pulmonary nodule 09/12/2019       Past Surgical History:   Procedure Laterality Date    BRONCHOSCOPY WITH ION ROBOTIC ASSIST N/A 6/26/2023    Procedure: BRONCHOSCOPY WITH ION ROBOT;  Surgeon: Rj Fritz MD;  Location:  [facility];  Service: Robotics - Pulmonary;  Laterality: N/A;  pre lung mass   post lung mass   Carol Ann Sapp    CHOLECYSTECTOMY      COLONOSCOPY      ENDOSCOPY      EYE SURGERY Right     cataract surgery    LOBECTOMY Right 7/21/2023    Procedure: RIGHT THORACOSCOPY WITH ZilikoINCI ROBOT WITH WEDGE RESECTION, POSSIBLE LOBECTOMY, MEDIASTINAL LYMPH NODE DISSECTION, POSSIBLE LOBECTOMY;  Surgeon: Shaggy Philippe MD;  Location:  [facility];  Service: Robotics - DaVinci;  Laterality: Right;    PATELLA FRACTURE SURGERY Left        Family History    Problem Relation Age of Onset    Hypertension Mother     No Known Problems Father     Diabetes Neg Hx     Cancer Neg Hx     Stroke Neg Hx        Social History     Socioeconomic History    Marital status:    Tobacco Use    Smoking status: Every Day     Current packs/day: 1.00     Average packs/day: 1 pack/day for 54.2 years (54.2 ttl pk-yrs)     Types: Cigarettes     Start date: 1970     Passive exposure: Never    Smokeless tobacco: Never   Vaping Use    Vaping status: Never Used   Substance and Sexual Activity    Alcohol use: Not Currently     Alcohol/week: 42.0 - 56.0 standard drinks of alcohol     Types: 42 - 56 Cans of beer per week     Comment: 6-8 beers per day    Drug use: No    Sexual activity: Not Currently       No Known Allergies      Current Outpatient Medications:     amLODIPine (NORVASC) 5 MG tablet, Take 1 tablet by mouth Daily., Disp: 90 tablet, Rfl: 1    aspirin 81 MG tablet, Take 1 tablet by mouth Daily., Disp: , Rfl:     citalopram (CeleXA) 20 MG tablet, Take 1 tablet by mouth Daily., Disp: 90 tablet, Rfl: 1    gabapentin (NEURONTIN) 300 MG capsule, Take 1 capsule by mouth 3 (Three) Times a Day., Disp: , Rfl:     HYDROcodone-acetaminophen (NORCO) 7.5-325 MG per tablet, Take 1 tablet by mouth Every 12 (Twelve) Hours As Needed for Moderate Pain., Disp: , Rfl:     metFORMIN (GLUCOPHAGE) 500 MG tablet, Take 1 tablet by mouth Daily With Breakfast., Disp: 90 tablet, Rfl: 1    metoprolol tartrate (LOPRESSOR) 50 MG tablet, Take 1 tablet by mouth 2 (Two) Times a Day., Disp: 180 tablet, Rfl: 1    naloxone (NARCAN) 4 MG/0.1ML nasal spray, 1 spray into the nostril(s) as directed by provider As Needed (opioid overdose). For Hydrocodone prescription, Disp: , Rfl:     pantoprazole (PROTONIX) 20 MG EC tablet, Take 1 tablet by mouth Daily., Disp: 90 tablet, Rfl: 1    simvastatin (ZOCOR) 40 MG tablet, Take 1 tablet by mouth Daily., Disp: 90 tablet, Rfl: 1    Current Facility-Administered Medications:      "cyanocobalamin injection 1,000 mcg, 1,000 mcg, Intramuscular, Q28 Days, Carol Ann Sapp, APRN, 1,000 mcg at 02/21/24 0756    Review of Systems   Constitutional: Negative.    HENT: Negative.     Eyes: Negative.    Respiratory: Negative.     Cardiovascular: Negative.         Claudication   Gastrointestinal: Negative.    Endocrine: Negative.    Genitourinary: Negative.    Musculoskeletal: Negative.    Skin: Negative.    Allergic/Immunologic: Negative.    Neurological: Negative.    Hematological: Negative.    Psychiatric/Behavioral: Negative.     All other systems reviewed and are negative.    /80   Pulse 67   Ht 188 cm (74\")   Wt 98.9 kg (218 lb)   SpO2 98%   BMI 27.99 kg/m²     Physical Exam  Vitals and nursing note reviewed.   Constitutional:       Appearance: Normal appearance. He is well-developed.   HENT:      Head: Normocephalic and atraumatic.   Eyes:      General: No scleral icterus.     Pupils: Pupils are equal, round, and reactive to light.   Neck:      Thyroid: No thyromegaly.      Vascular: No carotid bruit or JVD.   Cardiovascular:      Rate and Rhythm: Normal rate and regular rhythm.      Pulses:           Carotid pulses are 2+ on the right side and 2+ on the left side.       Femoral pulses are 2+ on the right side and 2+ on the left side.       Popliteal pulses are 2+ on the right side and 2+ on the left side.        Dorsalis pedis pulses are 2+ on the right side and 2+ on the left side.        Posterior tibial pulses are 2+ on the right side and 2+ on the left side.      Heart sounds: Normal heart sounds.   Pulmonary:      Effort: Pulmonary effort is normal.      Breath sounds: Normal breath sounds.   Abdominal:      General: Bowel sounds are normal. There is no distension or abdominal bruit.      Palpations: Abdomen is soft. There is no mass.      Tenderness: There is no abdominal tenderness.   Musculoskeletal:         General: Normal range of motion.      Cervical back: Neck supple. "   Lymphadenopathy:      Cervical: No cervical adenopathy.   Skin:     General: Skin is warm and dry.   Neurological:      Mental Status: He is alert and oriented to person, place, and time.      Cranial Nerves: No cranial nerve deficit.      Sensory: No sensory deficit.         CT Chest With Contrast Diagnostic    Result Date: 2/13/2024  Narrative: EXAMINATION: CT CHEST W CONTRAST DIAGNOSTIC- 2/13/2024 10:37 AM  HISTORY: Lung Cancer; C34.91-Malignant neoplasm of unspecified part of right bronchus or lung  TOTAL DOSE: 430.54 mGy.cm (Automatic exposure control technique was implemented in an effort to keep the radiation dose as low as possible without compromising image quality)  REPORT: Spiral CT of the chest was performed after administration of intravenous contrast from the thoracic inlet through the upper abdomen. Reconstructed coronal and sagittal images were also reviewed.  Comparison: CT chest with contrast 8/29/2023.  Review of lung windows demonstrates stable subpleural scarring at the apices and there are posttreatment changes in the right upper lobe compatible with the history of wedge resection, the inflammation associated with the surgical site appears to have regressed somewhat and there is well-defined scar tissue in this region, without a recurrent mass. There is a stable area of suspected parenchymal scarring in the left upper lobe, best seen on images 105 through 111 of series 5. There is trace right pleural effusion, some of the fluid tracks into the major fissure. There is mild basilar atelectasis greater on the right. No suspicious pulmonary nodule is identified. The airways are patent. There is no pneumothorax. Stable interstitial scarring is noted along the surface of the right middle lobe. There is mild to moderate diffuse centrilobular emphysema. Review of soft tissue windows shows normal appearance of the visualized thyroid gland. No enlarged lymph nodes are seen in the axilla. Normal sized  lymph nodes are present at the right hilum. There is mild subcarinal lymphadenopathy which is stable, with a maximum short axis diameter of 12 mm. Heart size is normal. There is heavy calcified plaque within the coronary arteries. The thoracic aorta is normal in caliber. Moderate calcified plaque is noted at the origin of the left common carotid artery, this appears to correspond with approximately 50% stenosis based on 2D measurements. The visualized upper abdomen demonstrates decreased attenuation of the liver parenchyma suggestive of steatosis. Cholecystectomy clips are present. There are several cysts within the kidneys. Adrenal glands appear unremarkable. Review of bone windows is unremarkable.      Impression: 1. Posttreatment changes in the right upper lobe compatible with the history of wedge resection, there is interval mild regression of granulation tissue or inflammation associated with scar tissue in this region. No residual recurrent mass or suspicious pulmonary nodule is identified. There is stable parenchymal and subpleural scarring at the left upper lobe. Moderate emphysema. 2. Mild stable subcarinal lymphadenopathy. 3. Trace right pleural effusion is present, some of the fluid tracks into the major fissure. 4. Heavy calcified plaque is noted within the coronary arteries. There is also heavy calcified plaque at the origin of the left common carotid artery, this appears to contribute to approximately 50% stenosis of the vessel.  This report was signed and finalized on 2/13/2024 10:50 AM by Dr. Barry Hill MD.        Patient Active Problem List   Diagnosis    Lumbar back pain    Gastroesophageal reflux disease without esophagitis    Essential hypertension    Hyperlipidemia LDL goal <100    Left upper lobe pulmonary nodule    History of primary tuberculosis    Personal history of nicotine dependence    Panlobular emphysema    Cervical paraspinal muscle spasm    Annual physical exam    Obstructive  sleep apnea    Hilar adenopathy    Adenocarcinoma, lung, right    Lung cancer    Tobacco use        Diagnosis Plan   1. Bilateral carotid artery stenosis  US Carotid Bilateral      2. PAD (peripheral artery disease)  US Ankle / Brachial Indices Extremity Complete      3. Essential hypertension        4. Hyperlipidemia LDL goal <100        5. Tobacco use            Plan: After thoroughly evaluating Rupa Verma, I believe the best course of action is to proceed with further imaging including a carotid duplex and ABIs.  I did review testing which did note some heavy calcified plaque in the origin of the left common carotid artery however unable to visualize past that.  We will see him back in about 2 weeks to discuss testing.  Will also order ABIs due to some complaints of claudication. I did discuss vascular risk factors as they pertain to the progression of vascular disease including controlling his hypertension, hyperlipidemia, and smoking cessation.  His blood pressure is elevated in office today at 156/80.  He should continue on his aspirin 81 mg daily and Zocor 40 mg daily in addition to his other medications.  Unfortunately, he is a current daily smoker and does not wish to quit smoking at this time.  The patient is to continue taking their medications as previously discussed.   This was all discussed in full with complete understanding.  Thank you for allowing me to participate in the care of your patient.  Please do not hesitate to call with any questions or concerns.  We will keep you aware of any further encounters with Rupa Verma.        Sincerely yours,         DO Sekou Vallejo Denise Evans, APRN

## 2024-03-22 ENCOUNTER — OFFICE VISIT (OUTPATIENT)
Dept: FAMILY MEDICINE CLINIC | Facility: CLINIC | Age: 69
End: 2024-03-22
Payer: MEDICARE

## 2024-03-22 VITALS
DIASTOLIC BLOOD PRESSURE: 70 MMHG | HEIGHT: 74 IN | OXYGEN SATURATION: 95 % | BODY MASS INDEX: 28.34 KG/M2 | WEIGHT: 220.8 LBS | TEMPERATURE: 98.6 F | SYSTOLIC BLOOD PRESSURE: 119 MMHG | HEART RATE: 78 BPM

## 2024-03-22 DIAGNOSIS — R11.2 NAUSEA AND VOMITING, UNSPECIFIED VOMITING TYPE: ICD-10-CM

## 2024-03-22 DIAGNOSIS — J20.9 ACUTE BRONCHITIS, UNSPECIFIED ORGANISM: Primary | ICD-10-CM

## 2024-03-22 DIAGNOSIS — Z85.118 HISTORY OF ADENOCARCINOMA OF LUNG: ICD-10-CM

## 2024-03-22 DIAGNOSIS — R05.1 ACUTE COUGH: ICD-10-CM

## 2024-03-22 LAB
EXPIRATION DATE: NORMAL
FLUAV AG UPPER RESP QL IA.RAPID: NOT DETECTED
FLUBV AG UPPER RESP QL IA.RAPID: NOT DETECTED
INTERNAL CONTROL: NORMAL
Lab: NORMAL
SARS-COV-2 AG UPPER RESP QL IA.RAPID: NOT DETECTED

## 2024-03-22 RX ORDER — CEFTRIAXONE 1 G/1
1 INJECTION, POWDER, FOR SOLUTION INTRAMUSCULAR; INTRAVENOUS ONCE
Status: COMPLETED | OUTPATIENT
Start: 2024-03-22 | End: 2024-03-22

## 2024-03-22 RX ORDER — ONDANSETRON 4 MG/1
4 TABLET, ORALLY DISINTEGRATING ORAL EVERY 8 HOURS PRN
Qty: 30 TABLET | Refills: 0 | Status: SHIPPED | OUTPATIENT
Start: 2024-03-22

## 2024-03-22 RX ORDER — PROMETHAZINE HYDROCHLORIDE AND CODEINE PHOSPHATE 6.25; 1 MG/5ML; MG/5ML
5 SOLUTION ORAL EVERY 4 HOURS PRN
Qty: 240 ML | Refills: 0 | Status: SHIPPED | OUTPATIENT
Start: 2024-03-22

## 2024-03-22 RX ORDER — TRIAMCINOLONE ACETONIDE 40 MG/ML
40 INJECTION, SUSPENSION INTRA-ARTICULAR; INTRAMUSCULAR ONCE
Status: COMPLETED | OUTPATIENT
Start: 2024-03-22 | End: 2024-03-22

## 2024-03-22 RX ADMIN — TRIAMCINOLONE ACETONIDE 40 MG: 40 INJECTION, SUSPENSION INTRA-ARTICULAR; INTRAMUSCULAR at 09:04

## 2024-03-22 RX ADMIN — CEFTRIAXONE 1 G: 1 INJECTION, POWDER, FOR SOLUTION INTRAMUSCULAR; INTRAVENOUS at 09:04

## 2024-03-22 NOTE — PROGRESS NOTES
"Chief Complaint  Cough    Subjective        Beary V Cotton presents to [facility]  History of Present Illness  Patient with recent history of lung cancer treated with resection. He has developed URI, as has his wife with same symptoms. He notes cough, productive at times; subjective fever \"haven't checked it\"; and, body aches. He is a poor historian. He continues to smoke heavily despite counseling otherwise.  He has not taken anything OTC to help with his symptoms.  When the cough is productive, he states it is thick discolored (green), foul tasting mucus.  He also notes vomiting on occasion since feeling ill.    Objective   Vital Signs:  /70 (BP Location: Left arm, Patient Position: Sitting, Cuff Size: Large Adult)   Pulse 78   Temp 98.6 °F (37 °C)   Ht 188 cm (74\")   Wt 100 kg (220 lb 12.8 oz)   SpO2 95%   BMI 28.35 kg/m²   Estimated body mass index is 28.35 kg/m² as calculated from the following:    Height as of this encounter: 188 cm (74\").    Weight as of this encounter: 100 kg (220 lb 12.8 oz).             Physical Exam  Vitals and nursing note reviewed.   Constitutional:       General: He is not in acute distress.     Appearance: Normal appearance. He is ill-appearing.   HENT:      Head: Normocephalic and atraumatic.      Right Ear: Tympanic membrane and ear canal normal.      Left Ear: Tympanic membrane and ear canal normal.      Nose: Nose normal.      Mouth/Throat:      Mouth: Mucous membranes are dry.      Pharynx: Oropharynx is clear.   Cardiovascular:      Rate and Rhythm: Normal rate and regular rhythm.      Heart sounds: Normal heart sounds.   Pulmonary:      Effort: Pulmonary effort is normal.      Breath sounds: Wheezing present.      Comments: Expiratory wheezes throughout lung fields.  Musculoskeletal:      Right lower leg: No edema.      Left lower leg: No edema.   Lymphadenopathy:      Cervical: No cervical adenopathy.   Skin:     General: Skin is " warm and dry.   Neurological:      Mental Status: He is alert.        Result Review :                Assessment and Plan   Diagnoses and all orders for this visit:    1. Acute bronchitis, unspecified organism (Primary)  -     cefTRIAXone (ROCEPHIN) injection 1 g  -     triamcinolone acetonide (KENALOG-40) injection 40 mg  -     promethazine-codeine (PHENERGAN with CODEINE) 6.25-10 MG/5ML solution; Take 5 mL by mouth Every 4 (Four) Hours As Needed for Cough.  Dispense: 240 mL; Refill: 0    2. Acute cough  -     POCT SARS-CoV-2 Antigen SHAVONNE + Flu  -     promethazine-codeine (PHENERGAN with CODEINE) 6.25-10 MG/5ML solution; Take 5 mL by mouth Every 4 (Four) Hours As Needed for Cough.  Dispense: 240 mL; Refill: 0    3. History of adenocarcinoma of lung    4. Nausea and vomiting, unspecified vomiting type  -     ondansetron ODT (ZOFRAN-ODT) 4 MG disintegrating tablet; Place 1 tablet on the tongue Every 8 (Eight) Hours As Needed for Nausea or Vomiting.  Dispense: 30 tablet; Refill: 0  -     promethazine-codeine (PHENERGAN with CODEINE) 6.25-10 MG/5ML solution; Take 5 mL by mouth Every 4 (Four) Hours As Needed for Cough.  Dispense: 240 mL; Refill: 0             Follow Up   Return if symptoms worsen or fail to improve.  Patient was given instructions and counseling regarding his condition or for health maintenance advice. Please see specific information pulled into the AVS if appropriate.     ARIAN Jackman  This note is electronically signed.

## 2024-04-09 DIAGNOSIS — Z02.89 PAIN MEDICATION AGREEMENT SIGNED: ICD-10-CM

## 2024-04-09 DIAGNOSIS — G89.4 CHRONIC PAIN SYNDROME: ICD-10-CM

## 2024-04-09 DIAGNOSIS — G89.29 CHRONIC MIDLINE LOW BACK PAIN WITHOUT SCIATICA: ICD-10-CM

## 2024-04-09 DIAGNOSIS — M54.50 CHRONIC MIDLINE LOW BACK PAIN WITHOUT SCIATICA: ICD-10-CM

## 2024-04-09 RX ORDER — GABAPENTIN 300 MG/1
300 CAPSULE ORAL 3 TIMES DAILY
Qty: 90 CAPSULE | Refills: 2 | Status: SHIPPED
Start: 2024-04-13 | End: 2024-04-10 | Stop reason: DRUGHIGH

## 2024-04-09 RX ORDER — HYDROCODONE BITARTRATE AND ACETAMINOPHEN 7.5; 325 MG/1; MG/1
1 TABLET ORAL 2 TIMES DAILY PRN
Qty: 60 TABLET | Refills: 0 | Status: SHIPPED | OUTPATIENT
Start: 2024-04-12 | End: 2024-04-10 | Stop reason: SDUPTHER

## 2024-04-10 ENCOUNTER — HOSPITAL ENCOUNTER (OUTPATIENT)
Dept: PAIN MANAGEMENT | Age: 69
Discharge: HOME | End: 2024-04-10
Payer: MEDICARE

## 2024-04-10 VITALS
HEIGHT: 74 IN | RESPIRATION RATE: 16 BRPM | DIASTOLIC BLOOD PRESSURE: 67 MMHG | WEIGHT: 217 LBS | OXYGEN SATURATION: 96 % | BODY MASS INDEX: 27.85 KG/M2 | HEART RATE: 75 BPM | TEMPERATURE: 98 F | SYSTOLIC BLOOD PRESSURE: 148 MMHG

## 2024-04-10 DIAGNOSIS — Z02.89 PAIN MEDICATION AGREEMENT SIGNED: ICD-10-CM

## 2024-04-10 DIAGNOSIS — G62.9 NEUROPATHY: Primary | ICD-10-CM

## 2024-04-10 DIAGNOSIS — M54.50 CHRONIC MIDLINE LOW BACK PAIN WITHOUT SCIATICA: ICD-10-CM

## 2024-04-10 DIAGNOSIS — F11.90 CHRONIC, CONTINUOUS USE OF OPIOIDS: ICD-10-CM

## 2024-04-10 DIAGNOSIS — G62.9 NEUROPATHY: ICD-10-CM

## 2024-04-10 DIAGNOSIS — G89.4 CHRONIC PAIN SYNDROME: ICD-10-CM

## 2024-04-10 DIAGNOSIS — M70.61 TROCHANTERIC BURSITIS OF RIGHT HIP: ICD-10-CM

## 2024-04-10 DIAGNOSIS — G89.29 CHRONIC MIDLINE LOW BACK PAIN WITHOUT SCIATICA: ICD-10-CM

## 2024-04-10 DIAGNOSIS — M53.3 PAIN OF RIGHT SACROILIAC JOINT: Primary | ICD-10-CM

## 2024-04-10 DIAGNOSIS — M53.3 SI (SACROILIAC) JOINT DYSFUNCTION: ICD-10-CM

## 2024-04-10 PROCEDURE — 99214 OFFICE O/P EST MOD 30 MIN: CPT | Performed by: PHYSICIAN ASSISTANT

## 2024-04-10 PROCEDURE — 99213 OFFICE O/P EST LOW 20 MIN: CPT

## 2024-04-10 RX ORDER — METOPROLOL TARTRATE 50 MG/1
TABLET, FILM COATED ORAL
COMMUNITY
Start: 2024-04-09

## 2024-04-10 ASSESSMENT — PAIN DESCRIPTION - PAIN TYPE: TYPE: CHRONIC PAIN

## 2024-04-10 ASSESSMENT — PAIN DESCRIPTION - DESCRIPTORS: DESCRIPTORS: BURNING;SHARP;PINS AND NEEDLES

## 2024-04-10 ASSESSMENT — PAIN DESCRIPTION - LOCATION: LOCATION: FOOT

## 2024-04-10 ASSESSMENT — ENCOUNTER SYMPTOMS
BACK PAIN: 1
CONSTIPATION: 0

## 2024-04-10 ASSESSMENT — PAIN DESCRIPTION - ORIENTATION: ORIENTATION: RIGHT

## 2024-04-10 ASSESSMENT — PAIN SCALES - GENERAL: PAINLEVEL_OUTOF10: 7

## 2024-04-10 NOTE — PROGRESS NOTES
Clinic Documentation      Education Provided:  [x] Review of [facility]  [] Agreement Review  [x] PEG Score Calculated [] PHQ Score Calculated [] ORT Score Calculated    [] Compliance Issues Discussed [] Cognitive Behavior Needs [x] Exercise [] Review of Test [] Financial Issues  [x] Tobacco/Alcohol Use Reviewed [x] Teaching [] New Patient [] Picture Obtained    Physician Plan:  [] Outgoing Referral  [] Pharmacy Consult  [] Test Ordered [x] Prescription Ordered/Changed   [] Obtained Test Results / Consult Notes        Complete if patient is withholding blood thinner for procedure     Blood Thinner Patient is currently taking:      [] Plavix (Hold for 7 days)  [] Aspirin (Hold for 5 days)     [] Pletal (Hold for 2 days)  [] Pradaxa (Hold for 3 days)    [] Effient (Hold for 7 days)  [] Xarelto (Hold for 2 days)    [] Eliquis (Hold for 2 days)  [] Brilinta (Hold for 7 days)    [] Coumadin (Hold for 5 days) - (INR needs to be drawn the day prior to procedure- INR < 2.0)    [] Aggrenox (Hold for 7 days)        [] Patient will stop medication on their own.    [] Blood Thinner Form Faxed for approval to hold.   Provider form faxed to:     Assessment Completed by:  Electronically signed by Erin Mcarthur MA on 4/10/2024 at 9:23 AM  
POC  [] Medication regimen not effective see plan for changes  [x] [facility] reviewed & on file under media     CC:  Nneka Lu, BRIAN - SHARAN Plascencia PA-C, 4/10/2024 at 9:22 AM    EMR dragon/transcription disclaimer: Much of this encounter note is electronic transcription/translation of spoken language to printed tach. Electronic translation of spoken language may be erroneous, or at times, nonsensical words or phrases may be inadvertently transcribed. Although, I have reviewed the note for such errors, some may still exist.

## 2024-04-10 NOTE — TELEPHONE ENCOUNTER
1. Chronic midline low back pain without sciatica    2. Pain medication agreement signed    3. Chronic pain syndrome    4. Neuropathy      Requested Prescriptions     Pending Prescriptions Disp Refills    HYDROcodone-acetaminophen (NORCO) 7.5-325 MG per tablet 60 tablet 0     Sig: Take 1 tablet by mouth 2 times daily as needed for Pain for up to 30 days. May fill 04/12/24    gabapentin (NEURONTIN) 400 MG capsule 90 capsule 2     Sig: Take 1 capsule by mouth 3 times daily for 90 days. Max Daily Amount: 1,200 mg       Continue medication with refill sent at appointment yes; refill sent to BRIAN Salazar at appointment yes, see refill encounter dated 4/10/2024    Electronically signed by Ale Plascencia PA-C on 4/10/2024 at 9:35 AM

## 2024-04-11 RX ORDER — GABAPENTIN 400 MG/1
400 CAPSULE ORAL 3 TIMES DAILY
Qty: 90 CAPSULE | Refills: 2 | Status: SHIPPED | OUTPATIENT
Start: 2024-04-12 | End: 2024-07-11

## 2024-04-11 RX ORDER — HYDROCODONE BITARTRATE AND ACETAMINOPHEN 7.5; 325 MG/1; MG/1
1 TABLET ORAL 2 TIMES DAILY PRN
Qty: 60 TABLET | Refills: 0 | Status: SHIPPED | OUTPATIENT
Start: 2024-04-12 | End: 2024-05-12

## 2024-04-19 ENCOUNTER — TELEPHONE (OUTPATIENT)
Dept: VASCULAR SURGERY | Facility: CLINIC | Age: 69
End: 2024-04-19
Payer: MEDICARE

## 2024-04-19 ENCOUNTER — CLINICAL SUPPORT (OUTPATIENT)
Dept: FAMILY MEDICINE CLINIC | Facility: CLINIC | Age: 69
End: 2024-04-19
Payer: MEDICARE

## 2024-04-19 DIAGNOSIS — E53.8 B12 DEFICIENCY: Primary | ICD-10-CM

## 2024-04-19 PROCEDURE — 96372 THER/PROPH/DIAG INJ SC/IM: CPT | Performed by: NURSE PRACTITIONER

## 2024-04-19 RX ORDER — CYANOCOBALAMIN 1000 UG/ML
1000 INJECTION, SOLUTION INTRAMUSCULAR; SUBCUTANEOUS
Status: SHIPPED | OUTPATIENT
Start: 2024-04-19

## 2024-04-19 RX ADMIN — CYANOCOBALAMIN 1000 MCG: 1000 INJECTION, SOLUTION INTRAMUSCULAR; SUBCUTANEOUS at 13:13

## 2024-04-19 NOTE — PROGRESS NOTES
Patient presented to the office for a B12 injection. Medication administered to the left deltoid. Patient waited 15 minutes in office with no reaction observed.

## 2024-04-22 ENCOUNTER — HOSPITAL ENCOUNTER (OUTPATIENT)
Dept: ULTRASOUND IMAGING | Facility: HOSPITAL | Age: 69
Discharge: HOME | End: 2024-04-22
Payer: MEDICARE

## 2024-04-22 ENCOUNTER — OFFICE VISIT (OUTPATIENT)
Dept: VASCULAR SURGERY | Facility: CLINIC | Age: 69
End: 2024-04-22
Payer: MEDICARE

## 2024-04-22 VITALS
WEIGHT: 214 LBS | DIASTOLIC BLOOD PRESSURE: 70 MMHG | HEIGHT: 75 IN | HEART RATE: 78 BPM | OXYGEN SATURATION: 98 % | BODY MASS INDEX: 26.61 KG/M2 | SYSTOLIC BLOOD PRESSURE: 146 MMHG

## 2024-04-22 DIAGNOSIS — I10 ESSENTIAL HYPERTENSION: ICD-10-CM

## 2024-04-22 DIAGNOSIS — Z72.0 TOBACCO USE: ICD-10-CM

## 2024-04-22 DIAGNOSIS — I73.9 PAD (PERIPHERAL ARTERY DISEASE): ICD-10-CM

## 2024-04-22 DIAGNOSIS — I65.23 BILATERAL CAROTID ARTERY STENOSIS: Primary | ICD-10-CM

## 2024-04-22 DIAGNOSIS — I65.23 BILATERAL CAROTID ARTERY STENOSIS: ICD-10-CM

## 2024-04-22 DIAGNOSIS — E78.5 HYPERLIPIDEMIA LDL GOAL <100: ICD-10-CM

## 2024-04-22 PROCEDURE — 3077F SYST BP >= 140 MM HG: CPT | Performed by: NURSE PRACTITIONER

## 2024-04-22 PROCEDURE — 93923 UPR/LXTR ART STDY 3+ LVLS: CPT

## 2024-04-22 PROCEDURE — 99214 OFFICE O/P EST MOD 30 MIN: CPT | Performed by: NURSE PRACTITIONER

## 2024-04-22 PROCEDURE — 93923 UPR/LXTR ART STDY 3+ LVLS: CPT | Performed by: SURGERY

## 2024-04-22 PROCEDURE — 1159F MED LIST DOCD IN RCRD: CPT | Performed by: NURSE PRACTITIONER

## 2024-04-22 PROCEDURE — 3078F DIAST BP <80 MM HG: CPT | Performed by: NURSE PRACTITIONER

## 2024-04-22 PROCEDURE — 93880 EXTRACRANIAL BILAT STUDY: CPT

## 2024-04-22 PROCEDURE — 1160F RVW MEDS BY RX/DR IN RCRD: CPT | Performed by: NURSE PRACTITIONER

## 2024-04-22 PROCEDURE — 93880 EXTRACRANIAL BILAT STUDY: CPT | Performed by: SURGERY

## 2024-04-22 NOTE — LETTER
"April 22, 2024       No Recipients    Patient: Rupa Verma   YOB: 1955   Date of Visit: 4/22/2024     Dear ARIAN Jackman:       Thank you for referring Rupa Verma to me for evaluation. Below are the relevant portions of my assessment and plan of care.    If you have questions, please do not hesitate to call me. I look forward to following Rupa along with you.         Sincerely,        Esperanza ARIAN Baron        CC:   No Recipients    Jackieyasir ARIAN Mata  04/22/24 1045  Sign when Signing Visit  4/22/2024     Carol Ann Sapp APRN  [address] [facility], [place] [address]      Rupa Verma  1955    Chief Complaint   Patient presents with   • Follow-up     2 week follow up w/ testing. Last seen 3/12/24. Patient denies any changes since last visit.       Dear Carol Ann Sapp APRN     HPI  I had the pleasure of seeing your patient Rupa Verma in the office today.   As you recall, Rupa Verma is a 69 y.o.  male who you are currently following for routine health maintenance.  He does have lung cancer and on previous testing was noted to have some carotid stenosis.  Currently he is doing well and denies any strokelike symptoms.  He is maintained on aspirin and Zocor.  He also has some complaints of claudication when he walks.  He has been a daily smoker since age 15.  He did have noninvasive testing performed today, which I did review in office.        Review of Systems   Constitutional: Negative.    HENT: Negative.     Eyes: Negative.    Respiratory: Negative.     Cardiovascular: Negative.         Claudication   Gastrointestinal: Negative.    Endocrine: Negative.    Genitourinary: Negative.    Musculoskeletal: Negative.    Skin: Negative.    Allergic/Immunologic: Negative.    Neurological: Negative.    Hematological: Negative.    Psychiatric/Behavioral: Negative.     All other systems reviewed and are negative.    /70   Pulse 78   Ht 190.5 cm (75\")  "  Wt 97.1 kg (214 lb)   SpO2 98%   BMI 26.75 kg/m²     Physical Exam  Vitals and nursing note reviewed.   Constitutional:       Appearance: Normal appearance. He is well-developed and overweight.   HENT:      Head: Normocephalic and atraumatic.   Eyes:      General: No scleral icterus.     Pupils: Pupils are equal, round, and reactive to light.   Neck:      Thyroid: No thyromegaly.      Vascular: No carotid bruit or JVD.   Cardiovascular:      Rate and Rhythm: Normal rate and regular rhythm.      Pulses:           Carotid pulses are 2+ on the right side and 2+ on the left side.       Femoral pulses are 2+ on the right side and 2+ on the left side.       Popliteal pulses are 2+ on the right side and 2+ on the left side.        Dorsalis pedis pulses are 2+ on the right side and 2+ on the left side.        Posterior tibial pulses are 2+ on the right side and 2+ on the left side.      Heart sounds: Normal heart sounds.   Pulmonary:      Effort: Pulmonary effort is normal.      Breath sounds: Normal breath sounds.   Abdominal:      General: Bowel sounds are normal. There is no distension or abdominal bruit.      Palpations: Abdomen is soft. There is no mass.      Tenderness: There is no abdominal tenderness.   Musculoskeletal:         General: Normal range of motion.      Cervical back: Neck supple.   Lymphadenopathy:      Cervical: No cervical adenopathy.   Skin:     General: Skin is warm and dry.   Neurological:      Mental Status: He is alert and oriented to person, place, and time.      Cranial Nerves: No cranial nerve deficit.      Sensory: No sensory deficit.   Psychiatric:         Mood and Affect: Mood normal.         Behavior: Behavior normal. Behavior is cooperative.         Thought Content: Thought content normal.         Judgment: Judgment normal.       Diagnostic data:  Noninvasive testing including a carotid duplex shows 50 to 69% carotid stenosis bilaterally with bilateral antegrade vertebral flow.  ABIs  show no evidence of arterial insufficiency to his lower extremities.      Patient Active Problem List   Diagnosis   • Lumbar back pain   • Gastroesophageal reflux disease without esophagitis   • Essential hypertension   • Hyperlipidemia LDL goal <100   • Left upper lobe pulmonary nodule   • History of primary tuberculosis   • Personal history of nicotine dependence   • Panlobular emphysema   • Cervical paraspinal muscle spasm   • Annual physical exam   • Obstructive sleep apnea   • Hilar adenopathy   • Adenocarcinoma, lung, right   • Lung cancer   • Tobacco use        Diagnosis Plan   1. Bilateral carotid artery stenosis  CT Angiogram Neck      2. PAD (peripheral artery disease)        3. Essential hypertension        4. Hyperlipidemia LDL goal <100        5. Tobacco use              Plan: After thoroughly evaluating Rupa Verma, I believe the best course of action is to proceed with further imaging including a CTA of the neck to further evaluate his left carotid disease.  I did review show 50 to 69% carotid stenosis bilaterally.  His ABIs showed no evidence of arterial insufficiency to his lower extremities.  I did discuss vascular risk factors as they pertain to the progression of vascular disease including controlling his hypertension, hyperlipidemia, and smoking cessation.  His blood pressure is elevated in office today at 156/80.  He should continue on his aspirin 81 mg daily and Zocor 40 mg daily in addition to his other medications.  Unfortunately, he is a current daily smoker and does not wish to quit smoking at this time.  The patient is to continue taking their medications as previously discussed.   This was all discussed in full with complete understanding.  Thank you for allowing me to participate in the care of your patient.  Please do not hesitate to call with any questions or concerns.  We will keep you aware of any further encounters with Rupa Verma.        Sincerely yours,         Esperanza  Tod, ARIAN Sapp, Carol Ann Lo, APRN

## 2024-04-22 NOTE — PROGRESS NOTES
"4/22/2024     Carol Ann Sapp, APRN  [address] [facility] [address]      Rupa Verma  1955    Chief Complaint   Patient presents with    Follow-up     2 week follow up w/ testing. Last seen 3/12/24. Patient denies any changes since last visit.       Dear Carol Ann Sapp, ARIAN     HPI  I had the pleasure of seeing your patient Rupa Verma in the office today.   As you recall, Rupa Verma is a 69 y.o.  male who you are currently following for routine health maintenance.  He does have lung cancer and on previous testing was noted to have some carotid stenosis.  Currently he is doing well and denies any strokelike symptoms.  He is maintained on aspirin and Zocor.  He also has some complaints of claudication when he walks.  He has been a daily smoker since age 15.  He did have noninvasive testing performed today, which I did review in office.        Review of Systems   Constitutional: Negative.    HENT: Negative.     Eyes: Negative.    Respiratory: Negative.     Cardiovascular: Negative.         Claudication   Gastrointestinal: Negative.    Endocrine: Negative.    Genitourinary: Negative.    Musculoskeletal: Negative.    Skin: Negative.    Allergic/Immunologic: Negative.    Neurological: Negative.    Hematological: Negative.    Psychiatric/Behavioral: Negative.     All other systems reviewed and are negative.    /70   Pulse 78   Ht 190.5 cm (75\")   Wt 97.1 kg (214 lb)   SpO2 98%   BMI 26.75 kg/m²     Physical Exam  Vitals and nursing note reviewed.   Constitutional:       Appearance: Normal appearance. He is well-developed and overweight.   HENT:      Head: Normocephalic and atraumatic.   Eyes:      General: No scleral icterus.     Pupils: Pupils are equal, round, and reactive to light.   Neck:      Thyroid: No thyromegaly.      Vascular: No carotid bruit or JVD.   Cardiovascular:      Rate and Rhythm: Normal rate and regular rhythm.      Pulses:           Carotid pulses are " 2+ on the right side and 2+ on the left side.       Femoral pulses are 2+ on the right side and 2+ on the left side.       Popliteal pulses are 2+ on the right side and 2+ on the left side.        Dorsalis pedis pulses are 2+ on the right side and 2+ on the left side.        Posterior tibial pulses are 2+ on the right side and 2+ on the left side.      Heart sounds: Normal heart sounds.   Pulmonary:      Effort: Pulmonary effort is normal.      Breath sounds: Normal breath sounds.   Abdominal:      General: Bowel sounds are normal. There is no distension or abdominal bruit.      Palpations: Abdomen is soft. There is no mass.      Tenderness: There is no abdominal tenderness.   Musculoskeletal:         General: Normal range of motion.      Cervical back: Neck supple.   Lymphadenopathy:      Cervical: No cervical adenopathy.   Skin:     General: Skin is warm and dry.   Neurological:      Mental Status: He is alert and oriented to person, place, and time.      Cranial Nerves: No cranial nerve deficit.      Sensory: No sensory deficit.   Psychiatric:         Mood and Affect: Mood normal.         Behavior: Behavior normal. Behavior is cooperative.         Thought Content: Thought content normal.         Judgment: Judgment normal.       Diagnostic data:  Noninvasive testing including a carotid duplex shows 50 to 69% carotid stenosis bilaterally with bilateral antegrade vertebral flow.  ABIs show no evidence of arterial insufficiency to his lower extremities.      Patient Active Problem List   Diagnosis    Lumbar back pain    Gastroesophageal reflux disease without esophagitis    Essential hypertension    Hyperlipidemia LDL goal <100    Left upper lobe pulmonary nodule    History of primary tuberculosis    Personal history of nicotine dependence    Panlobular emphysema    Cervical paraspinal muscle spasm    Annual physical exam    Obstructive sleep apnea    Hilar adenopathy    Adenocarcinoma, lung, right    Lung cancer     Tobacco use        Diagnosis Plan   1. Bilateral carotid artery stenosis  CT Angiogram Neck      2. PAD (peripheral artery disease)        3. Essential hypertension        4. Hyperlipidemia LDL goal <100        5. Tobacco use              Plan: After thoroughly evaluating Rupa Verma, I believe the best course of action is to proceed with further imaging including a CTA of the neck to further evaluate his left carotid disease.  I did review show 50 to 69% carotid stenosis bilaterally.  His ABIs showed no evidence of arterial insufficiency to his lower extremities.  I did discuss vascular risk factors as they pertain to the progression of vascular disease including controlling his hypertension, hyperlipidemia, and smoking cessation.  His blood pressure is elevated in office today at 156/80.  He should continue on his aspirin 81 mg daily and Zocor 40 mg daily in addition to his other medications.  Unfortunately, he is a current daily smoker and does not wish to quit smoking at this time.  The patient is to continue taking their medications as previously discussed.   This was all discussed in full with complete understanding.  Thank you for allowing me to participate in the care of your patient.  Please do not hesitate to call with any questions or concerns.  We will keep you aware of any further encounters with Rupa Verma.        Sincerely yours,         ARIAN Brown Denise Evans, APRN

## 2024-05-06 ENCOUNTER — TELEPHONE (OUTPATIENT)
Dept: VASCULAR SURGERY | Facility: CLINIC | Age: 69
End: 2024-05-06
Payer: MEDICARE

## 2024-05-08 DIAGNOSIS — Z02.89 PAIN MEDICATION AGREEMENT SIGNED: ICD-10-CM

## 2024-05-08 DIAGNOSIS — G89.4 CHRONIC PAIN SYNDROME: ICD-10-CM

## 2024-05-08 DIAGNOSIS — M54.50 CHRONIC MIDLINE LOW BACK PAIN WITHOUT SCIATICA: ICD-10-CM

## 2024-05-08 DIAGNOSIS — G89.29 CHRONIC MIDLINE LOW BACK PAIN WITHOUT SCIATICA: ICD-10-CM

## 2024-05-10 RX ORDER — HYDROCODONE BITARTRATE AND ACETAMINOPHEN 7.5; 325 MG/1; MG/1
1 TABLET ORAL 2 TIMES DAILY PRN
Qty: 60 TABLET | Refills: 0 | Status: SHIPPED | OUTPATIENT
Start: 2024-05-11 | End: 2024-06-10

## 2024-05-15 ENCOUNTER — HOSPITAL ENCOUNTER (OUTPATIENT)
Dept: PAIN MANAGEMENT | Age: 69
Discharge: HOME | End: 2024-05-15
Payer: MEDICARE

## 2024-05-15 VITALS
HEART RATE: 71 BPM | SYSTOLIC BLOOD PRESSURE: 128 MMHG | DIASTOLIC BLOOD PRESSURE: 59 MMHG | TEMPERATURE: 97.6 F | OXYGEN SATURATION: 97 % | RESPIRATION RATE: 18 BRPM

## 2024-05-15 DIAGNOSIS — M79.18 DIFFUSE MYOFASCIAL PAIN SYNDROME: Primary | ICD-10-CM

## 2024-05-15 PROCEDURE — 2500000003 HC RX 250 WO HCPCS

## 2024-05-15 PROCEDURE — 6360000002 HC RX W HCPCS

## 2024-05-15 PROCEDURE — 20611 DRAIN/INJ JOINT/BURSA W/US: CPT

## 2024-05-15 RX ORDER — ETHYL CHLORIDE 100 %
AEROSOL, SPRAY (ML) TOPICAL PRN
Status: DISCONTINUED | OUTPATIENT
Start: 2024-05-15 | End: 2024-05-17 | Stop reason: HOSPADM

## 2024-05-15 RX ORDER — LIDOCAINE HYDROCHLORIDE 10 MG/ML
1 INJECTION, SOLUTION EPIDURAL; INFILTRATION; INTRACAUDAL; PERINEURAL ONCE
Status: DISCONTINUED | OUTPATIENT
Start: 2024-05-15 | End: 2024-05-17 | Stop reason: HOSPADM

## 2024-05-15 RX ORDER — TRIAMCINOLONE ACETONIDE 40 MG/ML
40 INJECTION, SUSPENSION INTRA-ARTICULAR; INTRAMUSCULAR ONCE
Status: DISCONTINUED | OUTPATIENT
Start: 2024-05-15 | End: 2024-05-17 | Stop reason: HOSPADM

## 2024-05-15 RX ORDER — BUPIVACAINE HYDROCHLORIDE 5 MG/ML
1 INJECTION, SOLUTION EPIDURAL; INTRACAUDAL ONCE
Status: DISCONTINUED | OUTPATIENT
Start: 2024-05-15 | End: 2024-05-17 | Stop reason: HOSPADM

## 2024-05-15 NOTE — DISCHARGE INSTRUCTIONS
will be numb for a few hours after the procedure    [x] I understand if a steroid was used it will take effect in 3 - 7 days. I understand that as the numbing medication wears off, the pain may return until the steroids start working.    [x] I understand that today I will rest for the next 24 hours and drink plenty of water.    [] For Botox for Migraines please do not wear anything constricting around the forehead for 7-10 days post injection. Examples headband, hats, or bandana    [] For Botox for Spasticity start therapy for the affected limb in two weeks.    [] Additional instructions: ______________________________________________ ___________________________________________________________________    Sedation:  Was oral sedation given?    [] Yes  [x] No    I understand that if I took an oral nerve calming medication I will not drive for  [] 24 hours after taking the medication.    [x] I have received a copy of my discharge instructions and understand the above instructions to the best of my knowledge    Patient Discharged:  [x] Home  [] Hospital  [] Other  ____________________________________________    Via:  [x] Ambulatory  [] Wheelchair   [] Stretcher [] EMS       Accompanied By:   [x] Significant other  [] Family Member  [] Friend   [] Hospital Staff  []  Ambulance Staff  [] Other_______________________________________________    Plan:  [] Will return to the office in   [] 1 month   [] 3 months for:  [] Procedure Follow-up  [] Office Visit   [] Planned Procedure        [x] Printed AVS and given to patient.  [] Patient has mychart and does not wish for AVS to be printed.      If you have questions, problems or concerns you may call [phone] and follow the prompts

## 2024-05-15 NOTE — PROCEDURES
PROCEDURE NOTE  Date: 5/15/2024   Name: Eufemia Damian  YOB: 1955    Procedures        [facility]    Patient Name: Eufemia Damian    : 1955    Age: 69 y.o.    Sex: male    Date: May 15, 2024    Pre-op Diagnosis: right  diffuse muscle pain    Post-op Diagnosis: right  diffuse muscle pain    Procedure: Ultrasound Guided Injection of  right Sacroiliac Joint(s)     Performing Procedure:  DAVID Salazar    Patient Vitals for the past 24 hrs:   BP Temp Temp src Pulse Resp SpO2   05/15/24 0950 (!) 128/59 97.6 °F (36.4 °C) Temporal 71 18 97 %       right Sacroiliac Joint(s)     Description of Procedure:    After voluntary, informed and signed consent obtained the patient was placed in a prone position. Appropriate time out was obtained per policy. The Sacroiliac Joint(s) was palpated for area of maximal tenderness. The area was prepped in an aseptic fashion with CHG swab. The ultrasound transducer was used to confirm the appropriate location. The skin was sprayed with Gebauer's Solution. Under aseptic technique and direct ultrasound visualization a 22 gauge 3 inch spinal needle was introduced into the Sacroiliac Joint(s). Patient was asked if any new pain was radiating down the ipsilateral lower extremity. If patient noted radiating pain the needle was readjusted until radiating pain was gone. After a negative aspiration, a solution of 1 ml of 0.5% Marcaine Plain and 1 ml of 1% Lidocaine Plain and 1 ml of Kenalog (40mg/ml) was injected into the Sacroiliac Joint(s). The needle was withdrawn and a sterile dressing applied. If this was a bilateral procedure, the same steps were followed on the opposite side.     Preop Diagnosis: right Trochanteric Bursitis    Postop Diagnosis: right Trochanteric Bursitis    Procedure: Ultrasound Guided Injection of right Trochanteric Bursa(s)    Performing Procedure:  JANAE Salazar BC    Patient Vitals for the

## 2024-05-20 PROBLEM — M53.2X8 INSTABILITY OF SACROILIAC JOINT: Status: ACTIVE | Noted: 2024-05-20

## 2024-05-20 PROBLEM — M62.81 WEAKNESS OF TRUNK MUSCULATURE: Status: ACTIVE | Noted: 2024-05-20

## 2024-05-24 ENCOUNTER — CLINICAL SUPPORT (OUTPATIENT)
Dept: FAMILY MEDICINE CLINIC | Facility: CLINIC | Age: 69
End: 2024-05-24
Payer: MEDICARE

## 2024-05-24 DIAGNOSIS — E53.8 B12 DEFICIENCY: Primary | ICD-10-CM

## 2024-05-24 PROCEDURE — 96372 THER/PROPH/DIAG INJ SC/IM: CPT | Performed by: NURSE PRACTITIONER

## 2024-05-24 RX ADMIN — CYANOCOBALAMIN 1000 MCG: 1000 INJECTION, SOLUTION INTRAMUSCULAR; SUBCUTANEOUS at 09:43

## 2024-05-24 NOTE — PROGRESS NOTES
Patient presented to office today for a b12 injection to the left deltoid. Patient waited 15 minutes with no reaction observed.

## 2024-06-03 ENCOUNTER — TELEPHONE (OUTPATIENT)
Dept: VASCULAR SURGERY | Facility: CLINIC | Age: 69
End: 2024-06-03
Payer: MEDICARE

## 2024-06-03 NOTE — TELEPHONE ENCOUNTER
SPOKE WITH PT AS A REMINDER TO ARRIVE 1030 MAIN REG FOR TESTING THEN OFFICE APT AT 1300. PT STATED UNDERSTANDING.

## 2024-06-10 DIAGNOSIS — M54.50 CHRONIC MIDLINE LOW BACK PAIN WITHOUT SCIATICA: ICD-10-CM

## 2024-06-10 DIAGNOSIS — Z02.89 PAIN MEDICATION AGREEMENT SIGNED: ICD-10-CM

## 2024-06-10 DIAGNOSIS — G89.29 CHRONIC MIDLINE LOW BACK PAIN WITHOUT SCIATICA: ICD-10-CM

## 2024-06-10 DIAGNOSIS — G89.4 CHRONIC PAIN SYNDROME: ICD-10-CM

## 2024-06-11 RX ORDER — HYDROCODONE BITARTRATE AND ACETAMINOPHEN 7.5; 325 MG/1; MG/1
1 TABLET ORAL 2 TIMES DAILY PRN
Qty: 60 TABLET | Refills: 0 | Status: SHIPPED | OUTPATIENT
Start: 2024-06-11 | End: 2024-07-11

## 2024-06-19 ENCOUNTER — HOSPITAL ENCOUNTER (OUTPATIENT)
Dept: PAIN MANAGEMENT | Age: 69
Discharge: HOME | End: 2024-06-19
Payer: MEDICARE

## 2024-06-19 VITALS
OXYGEN SATURATION: 92 % | SYSTOLIC BLOOD PRESSURE: 95 MMHG | BODY MASS INDEX: 26.49 KG/M2 | HEIGHT: 75 IN | HEART RATE: 86 BPM | TEMPERATURE: 96.9 F | WEIGHT: 213 LBS | RESPIRATION RATE: 18 BRPM | DIASTOLIC BLOOD PRESSURE: 52 MMHG

## 2024-06-19 DIAGNOSIS — M53.3 SI (SACROILIAC) JOINT DYSFUNCTION: ICD-10-CM

## 2024-06-19 DIAGNOSIS — G89.4 CHRONIC PAIN SYNDROME: ICD-10-CM

## 2024-06-19 DIAGNOSIS — M54.41 CHRONIC BILATERAL LOW BACK PAIN WITH RIGHT-SIDED SCIATICA: Primary | ICD-10-CM

## 2024-06-19 DIAGNOSIS — M70.61 TROCHANTERIC BURSITIS OF RIGHT HIP: ICD-10-CM

## 2024-06-19 DIAGNOSIS — M54.50 CHRONIC MIDLINE LOW BACK PAIN WITHOUT SCIATICA: ICD-10-CM

## 2024-06-19 DIAGNOSIS — G62.9 NEUROPATHY: ICD-10-CM

## 2024-06-19 DIAGNOSIS — G89.29 CHRONIC MIDLINE LOW BACK PAIN WITHOUT SCIATICA: ICD-10-CM

## 2024-06-19 DIAGNOSIS — Z02.89 PAIN MEDICATION AGREEMENT SIGNED: ICD-10-CM

## 2024-06-19 DIAGNOSIS — G89.29 CHRONIC BILATERAL LOW BACK PAIN WITH RIGHT-SIDED SCIATICA: Primary | ICD-10-CM

## 2024-06-19 PROCEDURE — 99214 OFFICE O/P EST MOD 30 MIN: CPT

## 2024-06-19 PROCEDURE — 99213 OFFICE O/P EST LOW 20 MIN: CPT

## 2024-06-19 RX ORDER — HYDROCODONE BITARTRATE AND ACETAMINOPHEN 7.5; 325 MG/1; MG/1
1 TABLET ORAL 2 TIMES DAILY PRN
Qty: 60 TABLET | Refills: 0 | Status: SHIPPED | OUTPATIENT
Start: 2024-07-11 | End: 2024-08-10

## 2024-06-19 RX ORDER — GABAPENTIN 400 MG/1
400 CAPSULE ORAL 3 TIMES DAILY
Qty: 90 CAPSULE | Refills: 2 | Status: SHIPPED | OUTPATIENT
Start: 2024-06-19 | End: 2024-09-17

## 2024-06-19 RX ORDER — SULFAMETHOXAZOLE AND TRIMETHOPRIM 800; 160 MG/1; MG/1
TABLET ORAL
COMMUNITY
Start: 2024-06-07

## 2024-06-19 RX ORDER — CELECOXIB 100 MG/1
100 CAPSULE ORAL 2 TIMES DAILY
Qty: 60 CAPSULE | Refills: 2 | Status: SHIPPED | OUTPATIENT
Start: 2024-06-19

## 2024-06-19 ASSESSMENT — PAIN DESCRIPTION - LOCATION
LOCATION: BACK
LOCATION_2: FOOT

## 2024-06-19 ASSESSMENT — PAIN DESCRIPTION - ORIENTATION
ORIENTATION: LOWER
ORIENTATION_2: LEFT

## 2024-06-19 ASSESSMENT — ENCOUNTER SYMPTOMS
BACK PAIN: 1
CONSTIPATION: 0
BOWEL INCONTINENCE: 0

## 2024-06-19 ASSESSMENT — PAIN SCALES - GENERAL: PAINLEVEL_OUTOF10: 6

## 2024-06-19 ASSESSMENT — PAIN DESCRIPTION - PAIN TYPE: TYPE: CHRONIC PAIN

## 2024-06-19 ASSESSMENT — PAIN DESCRIPTION - INTENSITY: RATING_2: 8

## 2024-06-19 NOTE — TELEPHONE ENCOUNTER
1. Trochanteric bursitis of right hip    2. SI (sacroiliac) joint dysfunction    3. Neuropathy    4. Chronic midline low back pain without sciatica    5. Pain medication agreement signed    6. Chronic pain syndrome      Requested Prescriptions     Pending Prescriptions Disp Refills    celecoxib (CELEBREX) 100 MG capsule 60 capsule 2     Sig: Take 1 capsule by mouth 2 times daily    gabapentin (NEURONTIN) 400 MG capsule 90 capsule 2     Sig: Take 1 capsule by mouth 3 times daily for 90 days. Max Daily Amount: 1,200 mg    HYDROcodone-acetaminophen (NORCO) 7.5-325 MG per tablet 60 tablet 0     Sig: Take 1 tablet by mouth 2 times daily as needed for Pain for up to 30 days. May fill 07/11/24       Continue medication with refill sent at appointment yes; refill sent to medical director at appointment no, see refill encounter dated 6/19/2024    Electronically signed by BRIAN London CNP on 6/19/2024 at 9:38 AM

## 2024-06-19 NOTE — PROGRESS NOTES
Clinic Documentation      Education Provided:  [x] Review of [facility]  [] Agreement Review  [x] PEG Score Calculated [] PHQ Score Calculated [] ORT Score Calculated    [] Compliance Issues Discussed [] Cognitive Behavior Needs [x] Exercise [] Review of Test [] Financial Issues  [x] Tobacco/Alcohol Use Reviewed [x] Teaching [] New Patient [] Picture Obtained    Physician Plan:  [] Outgoing Referral  [] Pharmacy Consult  [] Test Ordered [x] Prescription Ordered/Changed   [] Obtained Test Results / Consult Notes        Complete if patient is withholding blood thinner for procedure     Blood Thinner Patient is currently taking:      [] Plavix (Hold for 7 days)  [] Aspirin (Hold for 5 days)     [] Pletal (Hold for 2 days)  [] Pradaxa (Hold for 3 days)    [] Effient (Hold for 7 days)  [] Xarelto (Hold for 2 days)    [] Eliquis (Hold for 2 days)  [] Brilinta (Hold for 7 days)    [] Coumadin (Hold for 5 days) - (INR needs to be drawn the day prior to procedure- INR < 2.0)    [] Aggrenox (Hold for 7 days)        [] Patient will stop medication on their own.    [] Blood Thinner Form Faxed for approval to hold.   Provider form faxed to:     Assessment Completed by:  Electronically signed by Delmy Nevarez RN on 6/19/2024 at 9:42 AM  
on file under media   [] Medication regimen effective with no c/o side effects and continued   [] New patient continuing current medication while developing POC   [x] On going assessment and evaluation of medication regimen and POC  [] Medication regimen not effective see plan for changes  [x] [facility] reviewed & on file under media     CC:  Nneka Lu, BRIAN - BRIAN Andrade - CNP, 6/19/2024 at 10:09 AM    EMR dragon/transcription disclaimer: Much of this encounter note is electronic transcription/translation of spoken language to printed tach. Electronic translation of spoken language may be erroneous, or at times, nonsensical words or phrases may be inadvertently transcribed. Although, I have reviewed the note for such errors, some may still exist.

## 2024-07-02 ENCOUNTER — TELEPHONE (OUTPATIENT)
Dept: FAMILY MEDICINE CLINIC | Facility: CLINIC | Age: 69
End: 2024-07-02
Payer: MEDICARE

## 2024-07-03 ENCOUNTER — TELEPHONE (OUTPATIENT)
Dept: PAIN MANAGEMENT | Age: 69
End: 2024-07-03

## 2024-07-05 DIAGNOSIS — R73.03 PREDIABETES: ICD-10-CM

## 2024-07-05 DIAGNOSIS — I10 ESSENTIAL HYPERTENSION: ICD-10-CM

## 2024-07-05 DIAGNOSIS — E78.5 HYPERLIPIDEMIA LDL GOAL <100: ICD-10-CM

## 2024-07-05 DIAGNOSIS — F32.89 OTHER DEPRESSION: ICD-10-CM

## 2024-07-05 DIAGNOSIS — K21.9 GASTROESOPHAGEAL REFLUX DISEASE, UNSPECIFIED WHETHER ESOPHAGITIS PRESENT: ICD-10-CM

## 2024-07-05 RX ORDER — AMLODIPINE BESYLATE 5 MG/1
5 TABLET ORAL DAILY
Qty: 30 TABLET | Refills: 0 | Status: SHIPPED | OUTPATIENT
Start: 2024-07-05

## 2024-07-05 RX ORDER — METOPROLOL TARTRATE 50 MG/1
50 TABLET, FILM COATED ORAL 2 TIMES DAILY
Qty: 60 TABLET | Refills: 0 | Status: SHIPPED | OUTPATIENT
Start: 2024-07-05

## 2024-07-05 RX ORDER — PANTOPRAZOLE SODIUM 20 MG/1
20 TABLET, DELAYED RELEASE ORAL DAILY
Qty: 30 TABLET | Refills: 0 | Status: SHIPPED | OUTPATIENT
Start: 2024-07-05

## 2024-07-05 RX ORDER — SIMVASTATIN 40 MG
40 TABLET ORAL DAILY
Qty: 30 TABLET | Refills: 0 | Status: SHIPPED | OUTPATIENT
Start: 2024-07-05

## 2024-07-05 RX ORDER — CITALOPRAM 20 MG/1
20 TABLET ORAL DAILY
Qty: 30 TABLET | Refills: 0 | Status: SHIPPED | OUTPATIENT
Start: 2024-07-05

## 2024-07-05 NOTE — TELEPHONE ENCOUNTER
Rx Refill Note  Requested Prescriptions     Pending Prescriptions Disp Refills    amLODIPine (NORVASC) 5 MG tablet [Pharmacy Med Name: AMLODIPINE BESYLATE 5MG TABS] 90 tablet 1     Sig: TAKE ONE TABLET BY MOUTH EVERY DAY    citalopram (CeleXA) 20 MG tablet [Pharmacy Med Name: CITALOPRAM HYDROBROMIDE 20MG TABS] 90 tablet 1     Sig: TAKE ONE TABLET BY MOUTH EVERY DAY    pantoprazole (PROTONIX) 20 MG EC tablet [Pharmacy Med Name: PANTOPRAZOLE SODIUM 20MG TBEC] 90 tablet 1     Sig: TAKE ONE TABLET BY MOUTH EVERY DAY    simvastatin (ZOCOR) 40 MG tablet [Pharmacy Med Name: SIMVASTATIN 40MG TABS] 90 tablet 1     Sig: TAKE ONE TABLET BY MOUTH EVERY DAY    metFORMIN (GLUCOPHAGE) 500 MG tablet [Pharmacy Med Name: METFORMIN HCL 500MG TABS] 90 tablet 1     Sig: TAKE ONE TABLET BY MOUTH EVERY DAY WITH BREAKFAST    metoprolol tartrate (LOPRESSOR) 50 MG tablet [Pharmacy Med Name: METOPROLOL TARTRATE 50MG TABS] 180 tablet 1     Sig: TAKE ONE TABLET BY MOUTH TWICE A DAY        Dedra Gould MA  07/05/24, 10:59 CDT

## 2024-07-15 ENCOUNTER — OFFICE VISIT (OUTPATIENT)
Dept: FAMILY MEDICINE CLINIC | Facility: CLINIC | Age: 69
End: 2024-07-15
Payer: MEDICARE

## 2024-07-15 VITALS
RESPIRATION RATE: 18 BRPM | OXYGEN SATURATION: 98 % | WEIGHT: 222 LBS | HEIGHT: 75 IN | TEMPERATURE: 97 F | DIASTOLIC BLOOD PRESSURE: 69 MMHG | HEART RATE: 79 BPM | SYSTOLIC BLOOD PRESSURE: 115 MMHG | BODY MASS INDEX: 27.6 KG/M2

## 2024-07-15 DIAGNOSIS — E53.8 B12 DEFICIENCY: ICD-10-CM

## 2024-07-15 DIAGNOSIS — R73.03 PREDIABETES: Primary | ICD-10-CM

## 2024-07-15 LAB
EXPIRATION DATE: ABNORMAL
HBA1C MFR BLD: 6.2 % (ref 4.5–5.7)
Lab: ABNORMAL

## 2024-07-15 PROCEDURE — 3078F DIAST BP <80 MM HG: CPT | Performed by: NURSE PRACTITIONER

## 2024-07-15 PROCEDURE — 99213 OFFICE O/P EST LOW 20 MIN: CPT | Performed by: NURSE PRACTITIONER

## 2024-07-15 PROCEDURE — 83036 HEMOGLOBIN GLYCOSYLATED A1C: CPT | Performed by: NURSE PRACTITIONER

## 2024-07-15 PROCEDURE — 1160F RVW MEDS BY RX/DR IN RCRD: CPT | Performed by: NURSE PRACTITIONER

## 2024-07-15 PROCEDURE — 96372 THER/PROPH/DIAG INJ SC/IM: CPT | Performed by: NURSE PRACTITIONER

## 2024-07-15 PROCEDURE — 1159F MED LIST DOCD IN RCRD: CPT | Performed by: NURSE PRACTITIONER

## 2024-07-15 PROCEDURE — 1125F AMNT PAIN NOTED PAIN PRSNT: CPT | Performed by: NURSE PRACTITIONER

## 2024-07-15 PROCEDURE — 3044F HG A1C LEVEL LT 7.0%: CPT | Performed by: NURSE PRACTITIONER

## 2024-07-15 PROCEDURE — 3074F SYST BP LT 130 MM HG: CPT | Performed by: NURSE PRACTITIONER

## 2024-07-15 RX ORDER — CELECOXIB 100 MG/1
100 CAPSULE ORAL 2 TIMES DAILY PRN
COMMUNITY

## 2024-07-15 RX ADMIN — CYANOCOBALAMIN 1000 MCG: 1000 INJECTION, SOLUTION INTRAMUSCULAR; SUBCUTANEOUS at 14:39

## 2024-07-15 NOTE — PROGRESS NOTES
"Chief Complaint  Diabetes (A1C check )    Subjective        Rupa Verma presents to [facility]  History of Present Illness  Patient presents for 3 month diabetes check. A1C today is 6.2. He states he does not take his blood sugars at home, but he denies symptoms of hyperglycemia and hypoglycemia. He reports he tries to partake in a healthy diet. He was seen at [facility] today for a wound on his left foot, there is a clean, dry, and intact dressing in place.     Objective   Vital Signs:  /69 (BP Location: Left arm, Patient Position: Sitting, Cuff Size: Adult)   Pulse 79   Temp 97 °F (36.1 °C) (Temporal)   Resp 18   Ht 190.5 cm (75\")   Wt 101 kg (222 lb)   SpO2 98%   BMI 27.75 kg/m²   Estimated body mass index is 27.75 kg/m² as calculated from the following:    Height as of this encounter: 190.5 cm (75\").    Weight as of this encounter: 101 kg (222 lb).    BMI is >= 25 and <30. (Overweight) The following options were offered after discussion;: exercise counseling/recommendations and nutrition counseling/recommendations        Physical Exam  Vitals and nursing note reviewed.   Constitutional:       General: He is not in acute distress.     Appearance: Normal appearance. He is not ill-appearing.   HENT:      Head: Normocephalic and atraumatic.   Cardiovascular:      Rate and Rhythm: Normal rate and regular rhythm.      Heart sounds: Normal heart sounds. No murmur heard.  Pulmonary:      Effort: Pulmonary effort is normal.      Breath sounds: Normal breath sounds.   Skin:     General: Skin is warm and dry.   Neurological:      Mental Status: He is alert and oriented to person, place, and time.        Result Review :  The following data was reviewed by: ARIAN Jackman on 07/15/2024:  Most Recent A1C          7/15/2024    14:42   HGBA1C Most Recent   Hemoglobin A1C 6.2                Assessment and Plan   Diagnoses and all orders for this " visit:    1. Prediabetes (Primary)  -     POCT glycated hemoglobin, total    2. B12 deficiency  -     cyanocobalamin injection 1,000 mcg             Follow Up   Return in about 6 months (around 1/15/2025) for medicare wellness with labs prior.  Patient was given instructions and counseling regarding his condition or for health maintenance advice. Please see specific information pulled into the AVS if appropriate.     ARIAN Jackman  This note is electronically signed.

## 2024-07-16 RX ORDER — CYANOCOBALAMIN 1000 UG/ML
1000 INJECTION, SOLUTION INTRAMUSCULAR; SUBCUTANEOUS
Status: SHIPPED | OUTPATIENT
Start: 2024-08-16

## 2024-07-30 ENCOUNTER — TELEPHONE (OUTPATIENT)
Dept: FAMILY MEDICINE CLINIC | Facility: CLINIC | Age: 69
End: 2024-07-30
Payer: MEDICARE

## 2024-07-30 DIAGNOSIS — I10 ESSENTIAL HYPERTENSION: ICD-10-CM

## 2024-07-30 DIAGNOSIS — M25.50 ARTHRALGIA, UNSPECIFIED JOINT: Primary | ICD-10-CM

## 2024-07-30 DIAGNOSIS — R73.03 PREDIABETES: ICD-10-CM

## 2024-07-30 DIAGNOSIS — E78.5 HYPERLIPIDEMIA LDL GOAL <100: ICD-10-CM

## 2024-07-30 DIAGNOSIS — F32.89 OTHER DEPRESSION: ICD-10-CM

## 2024-07-30 DIAGNOSIS — K21.9 GASTROESOPHAGEAL REFLUX DISEASE, UNSPECIFIED WHETHER ESOPHAGITIS PRESENT: ICD-10-CM

## 2024-07-30 RX ORDER — AMLODIPINE BESYLATE 5 MG/1
5 TABLET ORAL DAILY
Qty: 90 TABLET | Refills: 1 | Status: SHIPPED | OUTPATIENT
Start: 2024-07-30

## 2024-07-30 RX ORDER — CITALOPRAM 20 MG/1
20 TABLET ORAL DAILY
Qty: 90 TABLET | Refills: 1 | Status: SHIPPED | OUTPATIENT
Start: 2024-07-30

## 2024-07-30 RX ORDER — CELECOXIB 100 MG/1
100 CAPSULE ORAL 2 TIMES DAILY PRN
Qty: 180 CAPSULE | Refills: 1 | Status: SHIPPED | OUTPATIENT
Start: 2024-07-30

## 2024-07-30 RX ORDER — PANTOPRAZOLE SODIUM 20 MG/1
20 TABLET, DELAYED RELEASE ORAL DAILY
Qty: 30 TABLET | Refills: 0 | Status: SHIPPED | OUTPATIENT
Start: 2024-07-30

## 2024-07-30 RX ORDER — METOPROLOL TARTRATE 50 MG/1
50 TABLET, FILM COATED ORAL 2 TIMES DAILY
Qty: 180 TABLET | Refills: 1 | Status: SHIPPED | OUTPATIENT
Start: 2024-07-30

## 2024-07-30 RX ORDER — SIMVASTATIN 40 MG
40 TABLET ORAL DAILY
Qty: 90 TABLET | Refills: 1 | Status: SHIPPED | OUTPATIENT
Start: 2024-07-30

## 2024-07-30 NOTE — TELEPHONE ENCOUNTER
Caller: Rupa Verma    Relationship: Self    Best call back number: [phone]     Requested Prescriptions:   Requested Prescriptions     Pending Prescriptions Disp Refills    metoprolol tartrate (LOPRESSOR) 50 MG tablet 60 tablet 0     Sig: Take 1 tablet by mouth 2 (Two) Times a Day.    citalopram (CeleXA) 20 MG tablet 30 tablet 0     Sig: Take 1 tablet by mouth Daily.    amLODIPine (NORVASC) 5 MG tablet 30 tablet 0     Sig: Take 1 tablet by mouth Daily.    celecoxib (CeleBREX) 100 MG capsule       Sig: Take 1 capsule by mouth 2 (Two) Times a Day As Needed for Mild Pain.    metFORMIN (GLUCOPHAGE) 500 MG tablet 30 tablet 0     Sig: Take 1 tablet by mouth Daily With Breakfast.    pantoprazole (PROTONIX) 20 MG EC tablet 30 tablet 0     Sig: Take 1 tablet by mouth Daily.    simvastatin (ZOCOR) 40 MG tablet 30 tablet 0     Sig: Take 1 tablet by mouth Daily.        Pharmacy where request should be sent: [facility] [address] [phone] [facility] [phone] FX     Last office visit with prescribing clinician: 7/15/2024   Last telemedicine visit with prescribing clinician: Visit date not found   Next office visit with prescribing clinician: 1/20/2025     Additional details provided by patient: WOULD LIKE A 90 SUPPLY IF POSSIBLE     Does the patient have less than a 3 day supply:  [] Yes  [x] No    Would you like a call back once the refill request has been completed: [x] Yes [] No    If the office needs to give you a call back, can they leave a voicemail: [x] Yes [] No    Colby Mcarthur III, Miguel Rep   07/30/24 09:29 CDT

## 2024-07-30 NOTE — TELEPHONE ENCOUNTER
Rx Refill Note  Requested Prescriptions     Pending Prescriptions Disp Refills    metoprolol tartrate (LOPRESSOR) 50 MG tablet 60 tablet 0     Sig: Take 1 tablet by mouth 2 (Two) Times a Day.    citalopram (CeleXA) 20 MG tablet 30 tablet 0     Sig: Take 1 tablet by mouth Daily.    amLODIPine (NORVASC) 5 MG tablet 30 tablet 0     Sig: Take 1 tablet by mouth Daily.    celecoxib (CeleBREX) 100 MG capsule       Sig: Take 1 capsule by mouth 2 (Two) Times a Day As Needed for Mild Pain.    metFORMIN (GLUCOPHAGE) 500 MG tablet 30 tablet 0     Sig: Take 1 tablet by mouth Daily With Breakfast.    pantoprazole (PROTONIX) 20 MG EC tablet 30 tablet 0     Sig: Take 1 tablet by mouth Daily.    simvastatin (ZOCOR) 40 MG tablet 30 tablet 0     Sig: Take 1 tablet by mouth Daily.      Last office visit with prescribing clinician: 7/15/2024     Next office visit with prescribing clinician: 1/20/2025     LRX:7/5/24  LRX:7/5/24  LRX:7/5/24  LRX:hx med  LRX:7/5/24  LRX:7/5/24  LRX:7/5/24  1 month of all meds sent with no refills   Celecoxib is a hx med- please review       Shantelle Clayton MA  07/30/24, 09:57 CDT

## 2024-08-07 DIAGNOSIS — G89.4 CHRONIC PAIN SYNDROME: ICD-10-CM

## 2024-08-07 DIAGNOSIS — Z02.89 PAIN MEDICATION AGREEMENT SIGNED: ICD-10-CM

## 2024-08-07 DIAGNOSIS — M54.50 CHRONIC MIDLINE LOW BACK PAIN WITHOUT SCIATICA: ICD-10-CM

## 2024-08-07 DIAGNOSIS — G89.29 CHRONIC MIDLINE LOW BACK PAIN WITHOUT SCIATICA: ICD-10-CM

## 2024-08-07 RX ORDER — HYDROCODONE BITARTRATE AND ACETAMINOPHEN 7.5; 325 MG/1; MG/1
1 TABLET ORAL 2 TIMES DAILY PRN
Qty: 60 TABLET | Refills: 0 | Status: SHIPPED | OUTPATIENT
Start: 2024-08-10 | End: 2024-09-09

## 2024-08-20 ENCOUNTER — HOSPITAL ENCOUNTER (OUTPATIENT)
Dept: PAIN MANAGEMENT | Age: 69
Discharge: HOME | End: 2024-08-20
Payer: MEDICARE

## 2024-08-20 VITALS
HEART RATE: 71 BPM | TEMPERATURE: 96.7 F | DIASTOLIC BLOOD PRESSURE: 61 MMHG | RESPIRATION RATE: 18 BRPM | SYSTOLIC BLOOD PRESSURE: 146 MMHG | OXYGEN SATURATION: 97 %

## 2024-08-20 DIAGNOSIS — R52 PAIN MANAGEMENT: ICD-10-CM

## 2024-08-20 PROCEDURE — 2500000003 HC RX 250 WO HCPCS

## 2024-08-20 PROCEDURE — 6360000002 HC RX W HCPCS

## 2024-08-20 PROCEDURE — 2580000003 HC RX 258

## 2024-08-20 PROCEDURE — G0260 INJ FOR SACROILIAC JT ANESTH: HCPCS

## 2024-08-20 RX ORDER — METHYLPREDNISOLONE ACETATE 80 MG/ML
80 INJECTION, SUSPENSION INTRA-ARTICULAR; INTRALESIONAL; INTRAMUSCULAR; SOFT TISSUE ONCE
Status: DISCONTINUED | OUTPATIENT
Start: 2024-08-20 | End: 2024-08-22 | Stop reason: HOSPADM

## 2024-08-20 RX ORDER — LIDOCAINE HYDROCHLORIDE 10 MG/ML
7 INJECTION, SOLUTION EPIDURAL; INFILTRATION; INTRACAUDAL; PERINEURAL ONCE
Status: DISCONTINUED | OUTPATIENT
Start: 2024-08-20 | End: 2024-08-22 | Stop reason: HOSPADM

## 2024-08-20 RX ORDER — BUPIVACAINE HYDROCHLORIDE 5 MG/ML
2 INJECTION, SOLUTION EPIDURAL; INTRACAUDAL ONCE
Status: DISCONTINUED | OUTPATIENT
Start: 2024-08-20 | End: 2024-08-22 | Stop reason: HOSPADM

## 2024-08-20 RX ORDER — SODIUM CHLORIDE 9 MG/ML
3 INJECTION, SOLUTION INTRAMUSCULAR; INTRAVENOUS; SUBCUTANEOUS ONCE
Status: DISCONTINUED | OUTPATIENT
Start: 2024-08-20 | End: 2024-08-22 | Stop reason: HOSPADM

## 2024-08-20 ASSESSMENT — PAIN - FUNCTIONAL ASSESSMENT
PAIN_FUNCTIONAL_ASSESSMENT: NONE - DENIES PAIN
PAIN_FUNCTIONAL_ASSESSMENT: PREVENTS OR INTERFERES SOME ACTIVE ACTIVITIES AND ADLS

## 2024-08-20 NOTE — PROGRESS NOTES
Procedure:  Level of Consciousness: [x]Alert [x]Oriented []Disoriented []Lethargic  Anxiety Level: [x]Calm []Anxious []Depressed []Other  Skin: []Warm [x]Dry []Cool []Moist []Intact []Other  Cardiovascular: [x]Palpitations: [x]Never []Occasionally []Frequently  Chest Pain: [x]No []Yes  Respiratory:  [x]Unlabored []Labored []Cough ([] Productive []Unproductive)  HCG Required: [x]No []Yes   Results: []Negative []Positive  Knowledge Level:        [x]Patient/Other verbalized understanding of pre-procedure instructions.        [x]Assessment of post-op care needs (transportation, responsible caregiver)        [x]Able to discuss health care problems and how to deal with it.  Factors that Affect Teaching:        Language Barrier: [x]No []Yes - why:        Hearing Loss:        [x]No []Yes            Corrective Device:  []Yes [x]No        Vision Loss:           [x]No []Yes            Corrective Device:  []Yes [x]No        Memory Loss:       [x]No []Yes            []Short Term []Long Term  Motivational Level:  [x]Asks Questions                  []Extremely Anxious       [x]Seems Interested               []Seems Uninterested                  []Denies need for Education  Risk for Injury:  [x]Patient oriented to person, place and time  []History of frequent falls/loss of balance  Nutritional:  []Change in appetite   []Weight Gain   []Weight Loss  Functional:  []Requires assistance with ADL's

## 2024-08-20 NOTE — INTERVAL H&P NOTE
Update History & Physical    The patient's History and Physical was reviewed with the patient and I examined the patient. There was no change. The surgical site was confirmed by the patient and me.     Plan: The risks, benefits, expected outcome, and alternative to the recommended procedure have been discussed with the patient. Patient understands and wants to proceed with the procedure.     Electronically signed by Jonatan Chaudhary MD on 8/20/2024 at 10:35 AM

## 2024-09-05 ENCOUNTER — HOSPITAL ENCOUNTER (OUTPATIENT)
Dept: PAIN MANAGEMENT | Age: 69
Discharge: HOME | End: 2024-09-05
Payer: MEDICARE

## 2024-09-05 VITALS
OXYGEN SATURATION: 97 % | DIASTOLIC BLOOD PRESSURE: 58 MMHG | RESPIRATION RATE: 18 BRPM | SYSTOLIC BLOOD PRESSURE: 134 MMHG | HEART RATE: 65 BPM | TEMPERATURE: 97 F

## 2024-09-05 DIAGNOSIS — M53.3 SI (SACROILIAC) JOINT DYSFUNCTION: ICD-10-CM

## 2024-09-05 DIAGNOSIS — G89.29 CHRONIC MIDLINE LOW BACK PAIN WITHOUT SCIATICA: ICD-10-CM

## 2024-09-05 DIAGNOSIS — G62.9 NEUROPATHY: ICD-10-CM

## 2024-09-05 DIAGNOSIS — M70.61 TROCHANTERIC BURSITIS OF RIGHT HIP: ICD-10-CM

## 2024-09-05 DIAGNOSIS — Z02.89 PAIN MEDICATION AGREEMENT SIGNED: ICD-10-CM

## 2024-09-05 DIAGNOSIS — G89.4 CHRONIC PAIN SYNDROME: ICD-10-CM

## 2024-09-05 DIAGNOSIS — M70.61 TROCHANTERIC BURSITIS OF RIGHT HIP: Primary | ICD-10-CM

## 2024-09-05 DIAGNOSIS — M54.50 CHRONIC MIDLINE LOW BACK PAIN WITHOUT SCIATICA: ICD-10-CM

## 2024-09-05 PROCEDURE — 6360000002 HC RX W HCPCS

## 2024-09-05 PROCEDURE — 20611 DRAIN/INJ JOINT/BURSA W/US: CPT

## 2024-09-05 PROCEDURE — 2500000003 HC RX 250 WO HCPCS

## 2024-09-05 RX ORDER — GABAPENTIN 400 MG/1
400 CAPSULE ORAL 3 TIMES DAILY
Qty: 90 CAPSULE | Refills: 0 | Status: SHIPPED | OUTPATIENT
Start: 2024-09-05 | End: 2024-10-05

## 2024-09-05 RX ORDER — BUPIVACAINE HYDROCHLORIDE 5 MG/ML
1 INJECTION, SOLUTION EPIDURAL; INTRACAUDAL ONCE
Status: DISCONTINUED | OUTPATIENT
Start: 2024-09-05 | End: 2024-09-07 | Stop reason: HOSPADM

## 2024-09-05 RX ORDER — HYDROCODONE BITARTRATE AND ACETAMINOPHEN 7.5; 325 MG/1; MG/1
1 TABLET ORAL 2 TIMES DAILY PRN
Qty: 60 TABLET | Refills: 0 | Status: SHIPPED | OUTPATIENT
Start: 2024-09-09 | End: 2024-10-09

## 2024-09-05 RX ORDER — CELECOXIB 100 MG/1
100 CAPSULE ORAL 2 TIMES DAILY
Qty: 60 CAPSULE | Refills: 0 | Status: SHIPPED | OUTPATIENT
Start: 2024-09-05

## 2024-09-05 RX ORDER — TRIAMCINOLONE ACETONIDE 40 MG/ML
40 INJECTION, SUSPENSION INTRA-ARTICULAR; INTRAMUSCULAR ONCE
Status: DISCONTINUED | OUTPATIENT
Start: 2024-09-05 | End: 2024-09-07 | Stop reason: HOSPADM

## 2024-09-05 RX ORDER — LIDOCAINE HYDROCHLORIDE 10 MG/ML
1 INJECTION, SOLUTION EPIDURAL; INFILTRATION; INTRACAUDAL; PERINEURAL ONCE
Status: DISCONTINUED | OUTPATIENT
Start: 2024-09-05 | End: 2024-09-07 | Stop reason: HOSPADM

## 2024-09-05 NOTE — PROCEDURES
PROCEDURE NOTE  Date: 2024   Name: Eufemia Damian  YOB: 1955    Procedures        [facility]    Patient Name: Eufemia Damian    : 1955    Age: 69 y.o.    Sex: male    Date: 2024      Preop Diagnosis:  [x] Right   [] Left  [] Bilateral Trochanteric Bursitis    Postop Diagnosis: [x] Right   [] Left  [] Bilateral Trochanteric Bursitis     Procedure: Ultrasound Guided Injection of  [x]  Right  []  Left  [] Bilateral Trochanteric Bursa(s)     Performing Procedure: Tamara Wood CNP    Previously Had Procedure:   [x] Yes  []  No    Patient Vitals for the past 24 hrs:   BP Temp Temp src Pulse Resp SpO2   24 0957 (!) 134/58 97 °F (36.1 °C) Temporal 65 18 97 %       Description of Procedure:  After a brief physical assessment and failure to improve with conservative measures the patient presented for an injection of the [x] Right   [] Left  [] Bilateral Trochanteric Bursa(s).     The indications, limitations and possible complications were discussed with the patient and the patient elected to proceed with the procedure.     [x] Right Trochanteric Bursa     After voluntary, informed and signed consent the patient was placed in the lateral position with the right hip up     Appropriate time out was obtained per policy.     The area of maximal tenderness was palpated over the Right Trochanteric Bursa and the skin overlying this area marked with a skin marker.     The skin was then sprayed with Gebauer's Solution and prepped in a sterile fashion with [employer] swab. The ultrasound transducer was brought in and the Right Trochanteric Bursa was identified with ultrasound.     Under sterile technique and direct ultrasound visualization a 22 gauge 3 inch spinal needle was introduced into the Right Trochanteric Bursa. After a negative aspiration a solution of     [x] Kenalog 1 ml (40mg/ml), 1 ml of 0.5% Marcaine Plain and 1 ml of 1% Lidocaine

## 2024-09-05 NOTE — DISCHARGE INSTRUCTIONS
[facility] Physical And Pain Medicine  Post Procedure Discharge Instructions        YOU HAVE HAD THE FOLLOWING PROCEDURE:                                  [] Occipital Nerve Blocks  [] CTS wrist injection(s)  [] Knee Injection(s)         [] Shoulder Injection(s)   [] Elbow Injection(s)     [] Botox Injection  [] Cervical Trigger Point Injections    [] Thoracic Trigger Point Injections    [] Lumbar Trigger Point Injections  [] Piriformis Trigger Point Injections  [] SI Joint Injection(s)     [x] Trochanteric Bursa Injection(s)       [] Ankle Injection(s)   [] Plantar Fasciitis   []  ______________  Injection(s) [] Botox []  Migraines [] Spasticity    YOU HAVE RECEIVED THE FOLLOWING MEDICATIONS IN YOUR INJECTION(s)  [x] Lidocaine [x] Bupivacaine   [] DepoMedrol (steroid) [] Decadron (steroid)  [x]  Kenalog (steroid)   [] Toradol  [] Supartz [] Zilretta    [] Botox        PATIENT INFORMATION:   You may experience the following symptoms after your procedure. These symptoms are normal and should not cause concern:    You may have an increase in your pain. This may last 24 - 48 hours after your procedure.  You may have no change in the pain that you had prior to your injection(s).  You may have weakness or numbness in your affected extremity. If this occurs, this may last until numbing the medication wears off.     REPORT THE FOLLOWING SYMPTOMS TO YOUR DOCTOR:  Redness, swelling or drainage at the injection site(s)  Unusual pain that interferes with your normal activities of daily living.    OTHER INSTRUCTIONS:    [x] I will apply ice to the injection site(s) for at least 24 hours after the procedure. I will rotate the ice on for 20 minutes and off for 20 minutes for at least 24 hours.    [x] I will not apply heat for at least 48 hours and I will not take a hot bath or shower for at least 24 hours.     [x] I understand that if Lidocaine or Bupivacaine was used in my injection(s) that the injection site(s)

## 2024-09-16 ENCOUNTER — TELEPHONE (OUTPATIENT)
Dept: PAIN MANAGEMENT | Age: 69
End: 2024-09-16

## 2024-09-25 DIAGNOSIS — K21.9 GASTROESOPHAGEAL REFLUX DISEASE, UNSPECIFIED WHETHER ESOPHAGITIS PRESENT: ICD-10-CM

## 2024-09-25 RX ORDER — PANTOPRAZOLE SODIUM 20 MG/1
20 TABLET, DELAYED RELEASE ORAL DAILY
Qty: 30 TABLET | Refills: 5 | Status: SHIPPED | OUTPATIENT
Start: 2024-09-25

## 2024-10-08 DIAGNOSIS — G89.29 CHRONIC MIDLINE LOW BACK PAIN WITHOUT SCIATICA: ICD-10-CM

## 2024-10-08 DIAGNOSIS — M70.61 TROCHANTERIC BURSITIS OF RIGHT HIP: ICD-10-CM

## 2024-10-08 DIAGNOSIS — G62.9 NEUROPATHY: ICD-10-CM

## 2024-10-08 DIAGNOSIS — M53.3 SI (SACROILIAC) JOINT DYSFUNCTION: ICD-10-CM

## 2024-10-08 DIAGNOSIS — G89.4 CHRONIC PAIN SYNDROME: ICD-10-CM

## 2024-10-08 DIAGNOSIS — Z02.89 PAIN MEDICATION AGREEMENT SIGNED: ICD-10-CM

## 2024-10-08 DIAGNOSIS — M54.50 CHRONIC MIDLINE LOW BACK PAIN WITHOUT SCIATICA: ICD-10-CM

## 2024-10-08 RX ORDER — HYDROCODONE BITARTRATE AND ACETAMINOPHEN 7.5; 325 MG/1; MG/1
1 TABLET ORAL 2 TIMES DAILY PRN
Qty: 60 TABLET | Refills: 0 | Status: SHIPPED | OUTPATIENT
Start: 2024-10-10 | End: 2024-11-09

## 2024-10-08 RX ORDER — GABAPENTIN 400 MG/1
400 CAPSULE ORAL 3 TIMES DAILY
Qty: 90 CAPSULE | Refills: 0 | Status: SHIPPED | OUTPATIENT
Start: 2024-10-30 | End: 2024-11-29

## 2024-10-08 RX ORDER — CELECOXIB 100 MG/1
100 CAPSULE ORAL 2 TIMES DAILY
Qty: 60 CAPSULE | Refills: 0 | Status: SHIPPED | OUTPATIENT
Start: 2024-10-08

## 2024-10-21 ENCOUNTER — HOSPITAL ENCOUNTER (OUTPATIENT)
Dept: PAIN MANAGEMENT | Age: 69
Discharge: HOME | End: 2024-10-21
Payer: MEDICARE

## 2024-10-21 VITALS
RESPIRATION RATE: 16 BRPM | DIASTOLIC BLOOD PRESSURE: 76 MMHG | HEART RATE: 69 BPM | TEMPERATURE: 98.1 F | OXYGEN SATURATION: 95 % | WEIGHT: 220 LBS | HEIGHT: 74 IN | SYSTOLIC BLOOD PRESSURE: 151 MMHG | BODY MASS INDEX: 28.23 KG/M2

## 2024-10-21 DIAGNOSIS — M70.61 TROCHANTERIC BURSITIS OF RIGHT HIP: ICD-10-CM

## 2024-10-21 DIAGNOSIS — Z79.891 ENCOUNTER FOR MONITORING OPIOID MAINTENANCE THERAPY: ICD-10-CM

## 2024-10-21 DIAGNOSIS — G62.9 NEUROPATHY: ICD-10-CM

## 2024-10-21 DIAGNOSIS — M54.50 CHRONIC MIDLINE LOW BACK PAIN WITHOUT SCIATICA: ICD-10-CM

## 2024-10-21 DIAGNOSIS — Z02.89 PAIN MEDICATION AGREEMENT SIGNED: ICD-10-CM

## 2024-10-21 DIAGNOSIS — M53.3 SI (SACROILIAC) JOINT DYSFUNCTION: ICD-10-CM

## 2024-10-21 DIAGNOSIS — M54.41 CHRONIC BILATERAL LOW BACK PAIN WITH RIGHT-SIDED SCIATICA: Primary | ICD-10-CM

## 2024-10-21 DIAGNOSIS — F11.90 CHRONIC, CONTINUOUS USE OF OPIOIDS: ICD-10-CM

## 2024-10-21 DIAGNOSIS — G89.4 CHRONIC PAIN SYNDROME: ICD-10-CM

## 2024-10-21 DIAGNOSIS — Z51.81 ENCOUNTER FOR MONITORING OPIOID MAINTENANCE THERAPY: ICD-10-CM

## 2024-10-21 DIAGNOSIS — G89.29 CHRONIC MIDLINE LOW BACK PAIN WITHOUT SCIATICA: ICD-10-CM

## 2024-10-21 DIAGNOSIS — G89.29 CHRONIC BILATERAL LOW BACK PAIN WITH RIGHT-SIDED SCIATICA: Primary | ICD-10-CM

## 2024-10-21 PROCEDURE — 99214 OFFICE O/P EST MOD 30 MIN: CPT

## 2024-10-21 PROCEDURE — 99215 OFFICE O/P EST HI 40 MIN: CPT

## 2024-10-21 RX ORDER — HYDROCODONE BITARTRATE AND ACETAMINOPHEN 7.5; 325 MG/1; MG/1
1 TABLET ORAL 2 TIMES DAILY PRN
Qty: 60 TABLET | Refills: 0 | Status: SHIPPED | OUTPATIENT
Start: 2024-11-09 | End: 2024-12-09

## 2024-10-21 RX ORDER — CELECOXIB 100 MG/1
100 CAPSULE ORAL 2 TIMES DAILY
Qty: 60 CAPSULE | Refills: 0 | Status: SHIPPED | OUTPATIENT
Start: 2024-10-21

## 2024-10-21 ASSESSMENT — PAIN DESCRIPTION - ORIENTATION: ORIENTATION: LOWER

## 2024-10-21 ASSESSMENT — PAIN SCALES - GENERAL: PAINLEVEL_OUTOF10: 6

## 2024-10-21 ASSESSMENT — PAIN DESCRIPTION - LOCATION: LOCATION: BACK

## 2024-10-21 ASSESSMENT — PAIN DESCRIPTION - PAIN TYPE: TYPE: CHRONIC PAIN

## 2024-10-21 NOTE — TELEPHONE ENCOUNTER
1. Chronic midline low back pain without sciatica    2. Pain medication agreement signed    3. Chronic pain syndrome    4. Trochanteric bursitis of right hip    5. SI (sacroiliac) joint dysfunction      Requested Prescriptions     Pending Prescriptions Disp Refills    HYDROcodone-acetaminophen (NORCO) 7.5-325 MG per tablet 60 tablet 0     Sig: Take 1 tablet by mouth 2 times daily as needed for Pain for up to 30 days. May fill 11/09/24    celecoxib (CELEBREX) 100 MG capsule 60 capsule 0     Sig: Take 1 capsule by mouth 2 times daily       Continue medication with refill sent at appointment yes; refill sent to medical director at appointment no, see refill encounter dated 10/21/2024    Electronically signed by BRIAN London CNP on 10/21/2024 at 10:19 AM

## 2024-10-21 NOTE — PROGRESS NOTES
Clinic Documentation      Education Provided:  [x] Review of [facility]  [x] Agreement Review  [x] PEG Score Calculated [x] PHQ Score Calculated [x] ORT Score Calculated    [] Compliance Issues Discussed [] Cognitive Behavior Needs [x] Exercise [] Review of Test [] Financial Issues  [x] Tobacco/Alcohol Use Reviewed [x] Teaching [] New Patient [] Picture Obtained    Physician Plan:  [] Outgoing Referral  [] Pharmacy Consult  [x] Test Ordered [x] Prescription Ordered/Changed   [] Obtained Test Results / Consult Notes        Complete if patient is withholding blood thinner for procedure     Blood Thinner Patient is currently taking:      [] Plavix (Hold for 7 days)  [] Aspirin (Hold for 5 days)     [] Pletal (Hold for 2 days)  [] Pradaxa (Hold for 3 days)    [] Effient (Hold for 7 days)  [] Xarelto (Hold for 2 days)    [] Eliquis (Hold for 2 days)  [] Brilinta (Hold for 7 days)    [] Coumadin (Hold for 5 days) - (INR needs to be drawn the day prior to procedure- INR < 2.0)    [] Aggrenox (Hold for 7 days)        [] Patient will stop medication on their own.    [] Blood Thinner Form Faxed for approval to hold.   Provider form faxed to:     Assessment Completed by:  Electronically signed by Delmy Nevarez RN on 10/21/2024 at 10:23 AM

## 2024-10-22 PROBLEM — Z51.81 ENCOUNTER FOR MONITORING OPIOID MAINTENANCE THERAPY: Status: ACTIVE | Noted: 2024-10-22

## 2024-10-22 PROBLEM — Z79.891 ENCOUNTER FOR MONITORING OPIOID MAINTENANCE THERAPY: Status: ACTIVE | Noted: 2024-10-22

## 2024-10-22 ASSESSMENT — ENCOUNTER SYMPTOMS
BACK PAIN: 1
BOWEL INCONTINENCE: 0
CONSTIPATION: 0

## 2024-10-22 NOTE — H&P
[facility] Physical & Pain Medicine    History & Physical    Patient Name: Eufemia Damian    MR #: [id number]    Account #:[id number]    : 1955    Age: 69 y.o.    Sex: male    Date: 10/21/2024    PCP: Nneka Lu APRN - CNP    Referring Provider:    Chief Complaint:   Chief Complaint   Patient presents with    Back Pain     6/10      -  History of Present Illness:   The patient Eufemia Damian is a 69 y.o.male who presents to the office for annual exam and post procedure follow up. The patient has been established with this office since 2023. Patient has a history of adenocarcinoma right lung and follows Dr. Guzman and Dr. Kaba. Patient reports back pain is chronic with progression over time. Patient contributes multiple falls while working to his pain. No previous spinal surgeries.     Patient had Right Sacroiliac Joint Injection on 2024 Patient had at least 80-85% relief of pain from procedure(s) for at least 8 weeks. After injection, patient was able to increase activity such as transportation, shopping, preparing meals, laundry, housework, dressing, bathing, hygiene, and walking . Patient had less pain from aggravating factors such as physical activity, position, twisting/turning, lifting, and walking, sleeping. Patient was able to decrease pain medication usage. Patient received enough pain relief from injections that the patient would like to repeat the injection(s).     Patient had a Right Trochanteric Bursa Injection under Ultrasound on 2024. Patient had at least 80-85% relief of pain from procedure(s) for at least 8 weeks. After injection, patient was able to increase activity. Patient had less pain from aggravating factors. Patient was able to decrease pain medication usage. Patient received enough pain relief from injections that the patient would like to repeat the injection(s).     Patient has continued HEP - home exercise program for greater than 6 weeks which

## 2024-11-07 DIAGNOSIS — G62.9 NEUROPATHY: ICD-10-CM

## 2024-11-07 RX ORDER — GABAPENTIN 400 MG/1
400 CAPSULE ORAL 3 TIMES DAILY
Qty: 90 CAPSULE | Refills: 0 | Status: SHIPPED | OUTPATIENT
Start: 2024-11-30 | End: 2024-12-30

## 2024-11-27 ENCOUNTER — CLINICAL SUPPORT (OUTPATIENT)
Dept: FAMILY MEDICINE CLINIC | Facility: CLINIC | Age: 69
End: 2024-11-27
Payer: MEDICARE

## 2024-11-27 DIAGNOSIS — E53.8 B12 DEFICIENCY: Primary | ICD-10-CM

## 2024-11-27 PROCEDURE — 96372 THER/PROPH/DIAG INJ SC/IM: CPT | Performed by: NURSE PRACTITIONER

## 2024-11-27 RX ORDER — CYANOCOBALAMIN 1000 UG/ML
1000 INJECTION, SOLUTION INTRAMUSCULAR; SUBCUTANEOUS
Status: SHIPPED | OUTPATIENT
Start: 2024-11-27

## 2024-11-27 RX ADMIN — CYANOCOBALAMIN 1000 MCG: 1000 INJECTION, SOLUTION INTRAMUSCULAR; SUBCUTANEOUS at 09:32

## 2024-11-27 NOTE — PROGRESS NOTES
Injection  B12 Injection performed in L deltoid by Maty Garrett MA. Patient tolerated the procedure well without complications.  11/27/24   Maty Garrett MA

## 2024-12-05 ENCOUNTER — HOSPITAL ENCOUNTER (OUTPATIENT)
Dept: PAIN MANAGEMENT | Age: 69
Discharge: HOME | End: 2024-12-05
Payer: MEDICARE

## 2024-12-05 VITALS
SYSTOLIC BLOOD PRESSURE: 155 MMHG | HEART RATE: 77 BPM | RESPIRATION RATE: 18 BRPM | TEMPERATURE: 97.4 F | OXYGEN SATURATION: 96 % | DIASTOLIC BLOOD PRESSURE: 74 MMHG

## 2024-12-05 DIAGNOSIS — M54.50 CHRONIC MIDLINE LOW BACK PAIN WITHOUT SCIATICA: ICD-10-CM

## 2024-12-05 DIAGNOSIS — G89.4 CHRONIC PAIN SYNDROME: ICD-10-CM

## 2024-12-05 DIAGNOSIS — M70.61 TROCHANTERIC BURSITIS OF RIGHT HIP: ICD-10-CM

## 2024-12-05 DIAGNOSIS — M53.3 SI (SACROILIAC) JOINT DYSFUNCTION: Primary | ICD-10-CM

## 2024-12-05 DIAGNOSIS — M79.18 MYALGIA, OTHER SITE: ICD-10-CM

## 2024-12-05 DIAGNOSIS — G89.29 CHRONIC MIDLINE LOW BACK PAIN WITHOUT SCIATICA: ICD-10-CM

## 2024-12-05 DIAGNOSIS — G62.9 NEUROPATHY: ICD-10-CM

## 2024-12-05 DIAGNOSIS — Z02.89 PAIN MEDICATION AGREEMENT SIGNED: ICD-10-CM

## 2024-12-05 DIAGNOSIS — M53.3 SI (SACROILIAC) JOINT DYSFUNCTION: ICD-10-CM

## 2024-12-05 PROCEDURE — 20611 DRAIN/INJ JOINT/BURSA W/US: CPT

## 2024-12-05 PROCEDURE — 20552 NJX 1/MLT TRIGGER POINT 1/2: CPT

## 2024-12-05 PROCEDURE — 6360000002 HC RX W HCPCS

## 2024-12-05 RX ORDER — LIDOCAINE HYDROCHLORIDE 10 MG/ML
1 INJECTION, SOLUTION EPIDURAL; INFILTRATION; INTRACAUDAL; PERINEURAL ONCE
Status: DISCONTINUED | OUTPATIENT
Start: 2024-12-05 | End: 2024-12-07 | Stop reason: HOSPADM

## 2024-12-05 RX ORDER — TRIAMCINOLONE ACETONIDE 40 MG/ML
40 INJECTION, SUSPENSION INTRA-ARTICULAR; INTRAMUSCULAR ONCE
Status: DISCONTINUED | OUTPATIENT
Start: 2024-12-05 | End: 2024-12-07 | Stop reason: HOSPADM

## 2024-12-05 RX ORDER — GABAPENTIN 400 MG/1
400 CAPSULE ORAL 3 TIMES DAILY
Qty: 90 CAPSULE | Refills: 0 | Status: SHIPPED | OUTPATIENT
Start: 2024-12-30 | End: 2025-01-29

## 2024-12-05 RX ORDER — BUPIVACAINE HYDROCHLORIDE 5 MG/ML
1 INJECTION, SOLUTION EPIDURAL; INTRACAUDAL ONCE
Status: DISCONTINUED | OUTPATIENT
Start: 2024-12-05 | End: 2024-12-07 | Stop reason: HOSPADM

## 2024-12-05 RX ORDER — CELECOXIB 100 MG/1
100 CAPSULE ORAL 2 TIMES DAILY
Qty: 60 CAPSULE | Refills: 0 | Status: SHIPPED | OUTPATIENT
Start: 2024-12-05

## 2024-12-05 RX ORDER — HYDROCODONE BITARTRATE AND ACETAMINOPHEN 7.5; 325 MG/1; MG/1
1 TABLET ORAL 2 TIMES DAILY PRN
Qty: 60 TABLET | Refills: 0 | Status: SHIPPED | OUTPATIENT
Start: 2024-12-09 | End: 2025-01-08

## 2024-12-05 RX ORDER — ETHYL CHLORIDE 100 %
AEROSOL, SPRAY (ML) TOPICAL PRN
Status: DISCONTINUED | OUTPATIENT
Start: 2024-12-05 | End: 2024-12-07 | Stop reason: HOSPADM

## 2024-12-05 NOTE — DISCHARGE INSTRUCTIONS
[facility] Physical And Pain Medicine  Post Procedure Discharge Instructions        YOU HAVE HAD THE FOLLOWING PROCEDURE:                                  [] Occipital Nerve Blocks  [] CTS wrist injection(s)  [] Knee Injection(s)         [] Shoulder Injection(s)   [] Elbow Injection(s)     [] Botox Injection  [] Cervical Trigger Point Injections    [] Thoracic Trigger Point Injections    [] Lumbar Trigger Point Injections  [] Piriformis Trigger Point Injections  [x] SI Joint Injection(s)     [x] Trochanteric Bursa Injection(s)       [] Ankle Injection(s)   [] Plantar Fasciitis   []  ______________  Injection(s) [] Botox []  Migraines [] Spasticity    YOU HAVE RECEIVED THE FOLLOWING MEDICATIONS IN YOUR INJECTION(s)  [x] Lidocaine [x] Bupivacaine   [] DepoMedrol (steroid) [] Decadron (steroid)  [x]  Kenalog (steroid)   [] Toradol  [] Supartz [] Zilretta    [] Botox        PATIENT INFORMATION:   You may experience the following symptoms after your procedure. These symptoms are normal and should not cause concern:    You may have an increase in your pain. This may last 24 - 48 hours after your procedure.  You may have no change in the pain that you had prior to your injection(s).  You may have weakness or numbness in your affected extremity. If this occurs, this may last until numbing the medication wears off.     REPORT THE FOLLOWING SYMPTOMS TO YOUR DOCTOR:  Redness, swelling or drainage at the injection site(s)  Unusual pain that interferes with your normal activities of daily living.    OTHER INSTRUCTIONS:    [x] I will apply ice to the injection site(s) for at least 24 hours after the procedure. I will rotate the ice on for 20 minutes and off for 20 minutes for at least 24 hours.    [x] I will not apply heat for at least 48 hours and I will not take a hot bath or shower for at least 24 hours.     [x] I understand that if Lidocaine or Bupivacaine was used in my injection(s) that the injection site(s)

## 2024-12-05 NOTE — PROCEDURES
Trochanteric Bursa(s)    Description of Procedure:    right Trochanteric Bursa(s)    After voluntary, informed and signed consent obtained the patient was placed in a lateral recumbent position. Appropriate time out was obtained per policy. The trochanteric bursa(s) was palpated for area of maximal tenderness. The area was prepped in an aseptic fashion with CHG swab. The ultrasound transducer was used to confirm the appropriate location. The skin was sprayed with Gebauer's Solution. Under aseptic technique and direct ultrasound visualization a 22 gauge 3 inch spinal needle was introduced into the introduced into the Trochanteric Bursa(s). After a negative aspiration, a solution of 1 ml of 0.5% Marcaine Plain and 1 ml of 1% Lidocaine Plain    with    [x] 1 ml of Kenalog (40mg/ml)    [] 0.5 ml of DepoMedrol (80mg/ml)    [] 1 ml of DepoMedrol (80mg/ml)     [] 1 ml of Toradol (30mg/ml)     was injected into the introduced into the Trochanteric Bursa(s). The needle was withdrawn and a sterile dressing applied. If this was a bilateral procedure, the same steps were followed on the opposite side.     Discharge:    The patient tolerated the procedure well. There were no complications during the procedure and the patient was discharged home with discharge instructions.    The patient has been instructed to contact the office should there be any complications or questions to arise between today and their next appointment.    Plan:    Patient to keep previously scheduled follow up appointment.    Tamara Perkins, BRIAN - CNP, 12/5/2024 at 9:55 AM

## 2024-12-12 ENCOUNTER — TELEPHONE (OUTPATIENT)
Dept: PAIN MANAGEMENT | Age: 69
End: 2024-12-12

## 2025-01-06 ENCOUNTER — TELEPHONE (OUTPATIENT)
Dept: PAIN MANAGEMENT | Age: 70
End: 2025-01-06

## 2025-01-07 DIAGNOSIS — G89.4 CHRONIC PAIN SYNDROME: ICD-10-CM

## 2025-01-07 DIAGNOSIS — G62.9 NEUROPATHY: ICD-10-CM

## 2025-01-07 DIAGNOSIS — M70.61 TROCHANTERIC BURSITIS OF RIGHT HIP: ICD-10-CM

## 2025-01-07 DIAGNOSIS — M53.3 SI (SACROILIAC) JOINT DYSFUNCTION: ICD-10-CM

## 2025-01-07 DIAGNOSIS — Z02.89 PAIN MEDICATION AGREEMENT SIGNED: ICD-10-CM

## 2025-01-07 DIAGNOSIS — G89.29 CHRONIC MIDLINE LOW BACK PAIN WITHOUT SCIATICA: ICD-10-CM

## 2025-01-07 DIAGNOSIS — M54.50 CHRONIC MIDLINE LOW BACK PAIN WITHOUT SCIATICA: ICD-10-CM

## 2025-01-07 RX ORDER — HYDROCODONE BITARTRATE AND ACETAMINOPHEN 7.5; 325 MG/1; MG/1
1 TABLET ORAL 2 TIMES DAILY PRN
Qty: 60 TABLET | Refills: 0 | Status: SHIPPED | OUTPATIENT
Start: 2025-01-08 | End: 2025-02-07

## 2025-01-07 RX ORDER — GABAPENTIN 400 MG/1
400 CAPSULE ORAL 3 TIMES DAILY
Qty: 90 CAPSULE | Refills: 0 | Status: SHIPPED | OUTPATIENT
Start: 2025-01-29 | End: 2025-02-28

## 2025-01-07 RX ORDER — CELECOXIB 100 MG/1
100 CAPSULE ORAL 2 TIMES DAILY
Qty: 60 CAPSULE | Refills: 0 | Status: SHIPPED | OUTPATIENT
Start: 2025-01-07

## 2025-01-07 NOTE — TELEPHONE ENCOUNTER
Last seen in office visit: 10/21/24  Next scheduled office visit: 2/26/25 with Tamara  Last UDS results: compliant  Any discrepancies on [facility] (such as refills from another provider): none

## 2025-02-01 DIAGNOSIS — K21.9 GASTROESOPHAGEAL REFLUX DISEASE, UNSPECIFIED WHETHER ESOPHAGITIS PRESENT: ICD-10-CM

## 2025-02-01 DIAGNOSIS — E78.5 HYPERLIPIDEMIA LDL GOAL <100: ICD-10-CM

## 2025-02-01 DIAGNOSIS — F32.89 OTHER DEPRESSION: ICD-10-CM

## 2025-02-01 DIAGNOSIS — R73.03 PREDIABETES: ICD-10-CM

## 2025-02-01 DIAGNOSIS — I10 ESSENTIAL HYPERTENSION: ICD-10-CM

## 2025-02-03 RX ORDER — AMLODIPINE BESYLATE 5 MG/1
5 TABLET ORAL DAILY
Qty: 90 TABLET | Refills: 0 | Status: SHIPPED | OUTPATIENT
Start: 2025-02-03

## 2025-02-03 RX ORDER — SIMVASTATIN 40 MG
40 TABLET ORAL DAILY
Qty: 90 TABLET | Refills: 0 | Status: SHIPPED | OUTPATIENT
Start: 2025-02-03

## 2025-02-03 RX ORDER — CITALOPRAM HYDROBROMIDE 20 MG/1
20 TABLET ORAL DAILY
Qty: 90 TABLET | Refills: 0 | Status: SHIPPED | OUTPATIENT
Start: 2025-02-03

## 2025-02-03 RX ORDER — METOPROLOL TARTRATE 50 MG
50 TABLET ORAL 2 TIMES DAILY
Qty: 180 TABLET | Refills: 0 | Status: SHIPPED | OUTPATIENT
Start: 2025-02-03

## 2025-02-03 RX ORDER — PANTOPRAZOLE SODIUM 20 MG/1
20 TABLET, DELAYED RELEASE ORAL DAILY
Qty: 90 TABLET | Refills: 0 | Status: SHIPPED | OUTPATIENT
Start: 2025-02-03

## 2025-02-03 NOTE — TELEPHONE ENCOUNTER
Rx Refill Note  Requested Prescriptions     Pending Prescriptions Disp Refills    metoprolol tartrate (LOPRESSOR) 50 MG tablet [Pharmacy Med Name: METOPROLOL TARTRATE 50MG TABS] 180 tablet 1     Sig: TAKE ONE TABLET BY MOUTH TWICE A DAY    citalopram (CeleXA) 20 MG tablet [Pharmacy Med Name: CITALOPRAM HYDROBROMIDE 20MG TABS] 90 tablet 1     Sig: TAKE ONE TABLET BY MOUTH EVERY DAY    amLODIPine (NORVASC) 5 MG tablet [Pharmacy Med Name: AMLODIPINE BESYLATE 5MG TABS] 90 tablet 1     Sig: TAKE ONE TABLET BY MOUTH EVERY DAY    metFORMIN (GLUCOPHAGE) 500 MG tablet [Pharmacy Med Name: METFORMIN HCL 500MG TABS] 90 tablet 1     Sig: TAKE ONE TABLET BY MOUTH EVERY DAY WITH BREAKFAST    simvastatin (ZOCOR) 40 MG tablet [Pharmacy Med Name: SIMVASTATIN 40MG TABS] 90 tablet 1     Sig: TAKE ONE TABLET BY MOUTH EVERY DAY    pantoprazole (PROTONIX) 20 MG EC tablet [Pharmacy Med Name: PANTOPRAZOLE SODIUM 20MG TBEC] 30 tablet 5     Sig: TAKE ONE TABLET BY MOUTH EVERY DAY        Dedra Gould  02/03/25, 13:26 CST

## 2025-02-06 DIAGNOSIS — G89.4 CHRONIC PAIN SYNDROME: ICD-10-CM

## 2025-02-06 DIAGNOSIS — M70.61 TROCHANTERIC BURSITIS OF RIGHT HIP: ICD-10-CM

## 2025-02-06 DIAGNOSIS — M54.50 CHRONIC MIDLINE LOW BACK PAIN WITHOUT SCIATICA: ICD-10-CM

## 2025-02-06 DIAGNOSIS — M53.3 SI (SACROILIAC) JOINT DYSFUNCTION: ICD-10-CM

## 2025-02-06 DIAGNOSIS — G89.29 CHRONIC MIDLINE LOW BACK PAIN WITHOUT SCIATICA: ICD-10-CM

## 2025-02-06 DIAGNOSIS — Z02.89 PAIN MEDICATION AGREEMENT SIGNED: ICD-10-CM

## 2025-02-06 RX ORDER — HYDROCODONE BITARTRATE AND ACETAMINOPHEN 7.5; 325 MG/1; MG/1
1 TABLET ORAL 2 TIMES DAILY PRN
Qty: 60 TABLET | Refills: 0 | Status: SHIPPED | OUTPATIENT
Start: 2025-02-07 | End: 2025-03-09

## 2025-02-06 RX ORDER — CELECOXIB 100 MG/1
100 CAPSULE ORAL 2 TIMES DAILY
Qty: 60 CAPSULE | Refills: 0 | Status: SHIPPED | OUTPATIENT
Start: 2025-02-06

## 2025-02-06 NOTE — TELEPHONE ENCOUNTER
Last seen in office visit: 10/21/24  Next scheduled office visit: 2/12/25 with Racquel  Last UDS results: compliant  Any discrepancies on [facility] (such as refills from another provider): none    Patient was notified of results and recommendations. Patient understood and has no further questions at this time.

## 2025-02-12 ENCOUNTER — HOSPITAL ENCOUNTER (OUTPATIENT)
Dept: PAIN MANAGEMENT | Age: 70
Discharge: HOME | End: 2025-02-12
Payer: MEDICARE

## 2025-02-12 VITALS
WEIGHT: 228.2 LBS | HEART RATE: 71 BPM | OXYGEN SATURATION: 98 % | RESPIRATION RATE: 16 BRPM | DIASTOLIC BLOOD PRESSURE: 71 MMHG | BODY MASS INDEX: 29.29 KG/M2 | TEMPERATURE: 97.5 F | HEIGHT: 74 IN | SYSTOLIC BLOOD PRESSURE: 150 MMHG

## 2025-02-12 DIAGNOSIS — M53.3 SI (SACROILIAC) JOINT DYSFUNCTION: ICD-10-CM

## 2025-02-12 DIAGNOSIS — G62.9 NEUROPATHY: ICD-10-CM

## 2025-02-12 DIAGNOSIS — M54.50 CHRONIC BILATERAL LOW BACK PAIN WITHOUT SCIATICA: Primary | ICD-10-CM

## 2025-02-12 DIAGNOSIS — M79.18 MYALGIA, OTHER SITE: ICD-10-CM

## 2025-02-12 DIAGNOSIS — G89.29 CHRONIC BILATERAL LOW BACK PAIN WITHOUT SCIATICA: Primary | ICD-10-CM

## 2025-02-12 DIAGNOSIS — M54.50 CHRONIC MIDLINE LOW BACK PAIN WITHOUT SCIATICA: ICD-10-CM

## 2025-02-12 DIAGNOSIS — M70.61 TROCHANTERIC BURSITIS OF RIGHT HIP: ICD-10-CM

## 2025-02-12 DIAGNOSIS — G89.29 CHRONIC MIDLINE LOW BACK PAIN WITHOUT SCIATICA: ICD-10-CM

## 2025-02-12 DIAGNOSIS — F17.210 NICOTINE DEPENDENCE, CIGARETTES, UNCOMPLICATED: ICD-10-CM

## 2025-02-12 DIAGNOSIS — G89.4 CHRONIC PAIN SYNDROME: ICD-10-CM

## 2025-02-12 DIAGNOSIS — Z02.89 PAIN MEDICATION AGREEMENT SIGNED: ICD-10-CM

## 2025-02-12 DIAGNOSIS — F11.90 CHRONIC, CONTINUOUS USE OF OPIOIDS: ICD-10-CM

## 2025-02-12 PROCEDURE — 99215 OFFICE O/P EST HI 40 MIN: CPT

## 2025-02-12 PROCEDURE — 99214 OFFICE O/P EST MOD 30 MIN: CPT

## 2025-02-12 RX ORDER — GABAPENTIN 400 MG/1
400 CAPSULE ORAL 3 TIMES DAILY
Qty: 90 CAPSULE | Refills: 2 | Status: SHIPPED | OUTPATIENT
Start: 2025-02-28 | End: 2025-05-29

## 2025-02-12 RX ORDER — CELECOXIB 100 MG/1
100 CAPSULE ORAL 2 TIMES DAILY
Qty: 60 CAPSULE | Refills: 2 | Status: SHIPPED | OUTPATIENT
Start: 2025-02-12

## 2025-02-12 RX ORDER — HYDROCODONE BITARTRATE AND ACETAMINOPHEN 7.5; 325 MG/1; MG/1
1 TABLET ORAL 2 TIMES DAILY PRN
Qty: 60 TABLET | Refills: 0 | Status: SHIPPED | OUTPATIENT
Start: 2025-03-10 | End: 2025-04-09

## 2025-02-12 ASSESSMENT — ENCOUNTER SYMPTOMS
BACK PAIN: 1
BOWEL INCONTINENCE: 0
GASTROINTESTINAL NEGATIVE: 1
EYES NEGATIVE: 1
RESPIRATORY NEGATIVE: 1

## 2025-02-12 ASSESSMENT — PAIN DESCRIPTION - ORIENTATION: ORIENTATION: LOWER;MID

## 2025-02-12 ASSESSMENT — PAIN DESCRIPTION - LOCATION: LOCATION: BACK

## 2025-02-12 ASSESSMENT — PAIN SCALES - GENERAL: PAINLEVEL_OUTOF10: 5

## 2025-02-12 ASSESSMENT — PAIN DESCRIPTION - PAIN TYPE: TYPE: CHRONIC PAIN

## 2025-02-12 NOTE — PROGRESS NOTES
Clinic Documentation      Education Provided:  [x] Review of [facility]  [x] Agreement Review  [x] PEG Score Calculated [x] PHQ Score Calculated [x] ORT Score Calculated    [] Compliance Issues Discussed [] Cognitive Behavior Needs [x] Exercise [] Review of Test [] Financial Issues  [x] Tobacco/Alcohol Use Reviewed [x] Teaching [] New Patient [] Picture Obtained    Physician Plan:  [] Outgoing Referral  [] Pharmacy Consult  [] Test Ordered [x] Prescription Ordered/Changed   [] Obtained Test Results / Consult Notes        Complete if patient is withholding blood thinner for procedure     Blood Thinner Patient is currently taking:      [] Plavix (Hold for 7 days)  [] Aspirin (Hold for 5 days)     [] Pletal (Hold for 2 days)  [] Pradaxa (Hold for 3 days)    [] Effient (Hold for 7 days)  [] Xarelto (Hold for 2 days)    [] Eliquis (Hold for 2 days)  [] Brilinta (Hold for 7 days)    [] Coumadin (Hold for 5 days) - (INR needs to be drawn the day prior to procedure- INR < 2.0)    [] Aggrenox (Hold for 7 days)        [] Patient will stop medication on their own.    [] Blood Thinner Form Faxed for approval to hold.   Provider form faxed to:     Assessment Completed by:  Electronically signed by Bibi Jackson MA on 2/12/2025 at 11:55 AM  
recommendations, patients prescribed opiates with a history of overdose, substance use disorder, >50 OME's daily, or concurrent benzodiazepine use should be prescribed naloxone. We instructed the patient to keep one nasal spray on his/her person, and instruct his/her family and friends to use 2 sprays under their nose in the event of accidental overdose, then to call 911.    Potential opioid side effects were discussed in detail including constipation, urinary retention, pruritus, respiratory depression, sedation, dependence, addiction, tolerance, opioid induced hyperalgesia, osteopenia, hypogonadism and immune suppression.  Advised to take the opioid medications as prescribed.    [] Benzodiazapines and Narcotics:  Patient educated on the possible effects of combining Benzodiazapines and Opioids. Explained \"Black Box Warnings\" such as; possible suppressed breathing, hypoxia, anoxia, depressed cognition, heart arrhythmia, coma and possible death. Patient verbalized understanding concerning possible effects.    [x] Tobacco Cessation:  Patient educated on the harms of tobacco/nicotine use and was provided with resources on available programs to assist in smoking cessation. Patient does show understanding.  Patient does not have the desire to quit smoking in the near future.    [employer]uit [employer] [place] is a FREE online service available to [place] residents 15 years of age and over. When you become a member,it offers a free telephone service, so you can speak to a  in person, if you would prefer. Call the QuitLine at 7-249-QUITNOW or 1-912.393.8449.  special tools, a support team of coaches, research-based information, and a community of others trying to become tobacco free. Our expert coaches can talk to you about overcoming common barriers, such as dealing with stress, fighting cravings, coping with irritability, and controlling weight

## 2025-02-12 NOTE — TELEPHONE ENCOUNTER
1. Trochanteric bursitis of right hip  - celecoxib (CELEBREX) 100 MG capsule; Take 1 capsule by mouth 2 times daily  Dispense: 60 capsule; Refill: 2    2. SI (sacroiliac) joint dysfunction  - celecoxib (CELEBREX) 100 MG capsule; Take 1 capsule by mouth 2 times daily  Dispense: 60 capsule; Refill: 2    3. Neuropathy  - gabapentin (NEURONTIN) 400 MG capsule; Take 1 capsule by mouth 3 times daily for 90 days. May fill 2/28/25 Max Daily Amount: 1,200 mg  Dispense: 90 capsule; Refill: 2    4. Chronic midline low back pain without sciatica  - HYDROcodone-acetaminophen (NORCO) 7.5-325 MG per tablet; Take 1 tablet by mouth 2 times daily as needed for Pain for up to 30 days. May fill 3/10/25  Dispense: 60 tablet; Refill: 0    5. Pain medication agreement signed  - HYDROcodone-acetaminophen (NORCO) 7.5-325 MG per tablet; Take 1 tablet by mouth 2 times daily as needed for Pain for up to 30 days. May fill 3/10/25  Dispense: 60 tablet; Refill: 0    6. Chronic pain syndrome  - HYDROcodone-acetaminophen (NORCO) 7.5-325 MG per tablet; Take 1 tablet by mouth 2 times daily as needed for Pain for up to 30 days. May fill 3/10/25  Dispense: 60 tablet; Refill: 0      Requested Prescriptions     Signed Prescriptions Disp Refills    celecoxib (CELEBREX) 100 MG capsule 60 capsule 2     Sig: Take 1 capsule by mouth 2 times daily     Authorizing Provider: JEMAL DIOR    gabapentin (NEURONTIN) 400 MG capsule 90 capsule 2     Sig: Take 1 capsule by mouth 3 times daily for 90 days. May fill 2/28/25 Max Daily Amount: 1,200 mg     Authorizing Provider: JEMAL DIOR    HYDROcodone-acetaminophen (NORCO) 7.5-325 MG per tablet 60 tablet 0     Sig: Take 1 tablet by mouth 2 times daily as needed for Pain for up to 30 days. May fill 3/10/25     Authorizing Provider: JEMAL IDOR       Continue medication with refill sent at appointment yes; refill sent to medical director at appointment no, see refill encounter dated

## 2025-02-24 ENCOUNTER — OFFICE VISIT (OUTPATIENT)
Dept: FAMILY MEDICINE CLINIC | Facility: CLINIC | Age: 70
End: 2025-02-24
Payer: MEDICARE

## 2025-02-24 VITALS
SYSTOLIC BLOOD PRESSURE: 137 MMHG | BODY MASS INDEX: 28.5 KG/M2 | DIASTOLIC BLOOD PRESSURE: 84 MMHG | HEART RATE: 74 BPM | TEMPERATURE: 97.7 F | WEIGHT: 229.2 LBS | HEIGHT: 75 IN | OXYGEN SATURATION: 99 %

## 2025-02-24 DIAGNOSIS — E66.3 OVERWEIGHT WITH BODY MASS INDEX (BMI) OF 28 TO 28.9 IN ADULT: ICD-10-CM

## 2025-02-24 DIAGNOSIS — R73.03 PREDIABETES: ICD-10-CM

## 2025-02-24 DIAGNOSIS — I10 ESSENTIAL HYPERTENSION: ICD-10-CM

## 2025-02-24 DIAGNOSIS — Z12.5 SCREENING FOR PROSTATE CANCER: ICD-10-CM

## 2025-02-24 DIAGNOSIS — E78.5 HYPERLIPIDEMIA LDL GOAL <100: ICD-10-CM

## 2025-02-24 DIAGNOSIS — Z87.891 PERSONAL HISTORY OF NICOTINE DEPENDENCE: ICD-10-CM

## 2025-02-24 DIAGNOSIS — D51.9 ANEMIA DUE TO VITAMIN B12 DEFICIENCY, UNSPECIFIED B12 DEFICIENCY TYPE: ICD-10-CM

## 2025-02-24 DIAGNOSIS — R53.83 FATIGUE, UNSPECIFIED TYPE: ICD-10-CM

## 2025-02-24 DIAGNOSIS — Z00.00 MEDICARE ANNUAL WELLNESS VISIT, SUBSEQUENT: Primary | ICD-10-CM

## 2025-02-24 DIAGNOSIS — R10.31 RLQ ABDOMINAL PAIN: ICD-10-CM

## 2025-02-24 DIAGNOSIS — Z78.9 DAILY CONSUMPTION OF ALCOHOL: ICD-10-CM

## 2025-02-24 PROCEDURE — 1159F MED LIST DOCD IN RCRD: CPT | Performed by: NURSE PRACTITIONER

## 2025-02-24 PROCEDURE — G0439 PPPS, SUBSEQ VISIT: HCPCS | Performed by: NURSE PRACTITIONER

## 2025-02-24 PROCEDURE — 99213 OFFICE O/P EST LOW 20 MIN: CPT | Performed by: NURSE PRACTITIONER

## 2025-02-24 PROCEDURE — 1160F RVW MEDS BY RX/DR IN RCRD: CPT | Performed by: NURSE PRACTITIONER

## 2025-02-24 PROCEDURE — 3075F SYST BP GE 130 - 139MM HG: CPT | Performed by: NURSE PRACTITIONER

## 2025-02-24 PROCEDURE — 1125F AMNT PAIN NOTED PAIN PRSNT: CPT | Performed by: NURSE PRACTITIONER

## 2025-02-24 PROCEDURE — 1170F FXNL STATUS ASSESSED: CPT | Performed by: NURSE PRACTITIONER

## 2025-02-24 PROCEDURE — 96372 THER/PROPH/DIAG INJ SC/IM: CPT | Performed by: NURSE PRACTITIONER

## 2025-02-24 PROCEDURE — 3079F DIAST BP 80-89 MM HG: CPT | Performed by: NURSE PRACTITIONER

## 2025-02-24 RX ADMIN — CYANOCOBALAMIN 1000 MCG: 1000 INJECTION, SOLUTION INTRAMUSCULAR; SUBCUTANEOUS at 10:05

## 2025-02-24 NOTE — PROGRESS NOTES
Subjective   The ABCs of the Annual Wellness Visit  Medicare Wellness Visit      Rupa Verma is a 69 y.o. patient who presents for a Medicare Wellness Visit.    The following portions of the patient's history were reviewed and   updated as appropriate: allergies, current medications, past family history, past medical history, past social history, past surgical history, and problem list.    Compared to one year ago, the patient's physical   health is better.  Compared to one year ago, the patient's mental   health is the same.    Recent Hospitalizations:  He was not admitted to the hospital during the last year.     Current Medical Providers:  Patient Care Team:  Carol Ann Sapp APRN as PCP - General (Family Medicine)  Laureano Weathers MD as Consulting Physician (Pulmonary Disease)  Rj Fritz MD as Consulting Physician (Pulmonary Disease)  Geno James APRN as Nurse Practitioner (Nurse Practitioner)  Shaggy Philippe MD as Consulting Physician (Cardiothoracic Surgery)  Keri Murillo APRN (Family Medicine)  Evelia Buenrostro APRN (Nurse Practitioner)    Outpatient Medications Prior to Visit   Medication Sig Dispense Refill    amLODIPine (NORVASC) 5 MG tablet TAKE ONE TABLET BY MOUTH EVERY DAY 90 tablet 0    aspirin 81 MG tablet Take 1 tablet by mouth Daily.      celecoxib (CeleBREX) 100 MG capsule Take 1 capsule by mouth 2 (Two) Times a Day As Needed for Mild Pain. 180 capsule 1    citalopram (CeleXA) 20 MG tablet TAKE ONE TABLET BY MOUTH EVERY DAY 90 tablet 0    gabapentin (NEURONTIN) 300 MG capsule Take 1 capsule by mouth 3 (Three) Times a Day.      HYDROcodone-acetaminophen (NORCO) 7.5-325 MG per tablet Take 1 tablet by mouth Every 12 (Twelve) Hours As Needed for Moderate Pain.      metFORMIN (GLUCOPHAGE) 500 MG tablet TAKE ONE TABLET BY MOUTH EVERY DAY WITH BREAKFAST 90 tablet 0    metoprolol tartrate (LOPRESSOR) 50 MG tablet TAKE ONE TABLET BY MOUTH TWICE A  tablet 0     naloxone (NARCAN) 4 MG/0.1ML nasal spray Administer 1 spray into the nostril(s) as directed by provider As Needed (opioid overdose). For Hydrocodone prescription      ondansetron ODT (ZOFRAN-ODT) 4 MG disintegrating tablet Place 1 tablet on the tongue Every 8 (Eight) Hours As Needed for Nausea or Vomiting. 30 tablet 0    pantoprazole (PROTONIX) 20 MG EC tablet TAKE ONE TABLET BY MOUTH EVERY DAY 90 tablet 0    promethazine-codeine (PHENERGAN with CODEINE) 6.25-10 MG/5ML solution Take 5 mL by mouth Every 4 (Four) Hours As Needed for Cough. 240 mL 0    simvastatin (ZOCOR) 40 MG tablet TAKE ONE TABLET BY MOUTH EVERY DAY 90 tablet 0     Facility-Administered Medications Prior to Visit   Medication Dose Route Frequency Provider Last Rate Last Admin    cyanocobalamin injection 1,000 mcg  1,000 mcg Intramuscular Q28 Days Carol Ann Sapp, APRN        cyanocobalamin injection 1,000 mcg  1,000 mcg Intramuscular Q30 Days Alba Seo, APRN   1,000 mcg at 02/24/25 1005     Opioid medication/s are on active medication list.  and I have evaluated his active treatment plan and pain score trends (see table).  Vitals:    02/24/25 0930   PainSc: 6    PainLoc: Back     I have reviewed the chart for potential of high risk medication and harmful drug interactions in the elderly.        Aspirin is on active medication list. Aspirin use is indicated based on review of current medical condition/s. Pros and cons of this therapy have been discussed today. Benefits of this medication outweigh potential harm.  Patient has been encouraged to continue taking this medication.  .      Patient Active Problem List   Diagnosis    Lumbar back pain    Gastroesophageal reflux disease without esophagitis    Essential hypertension    Hyperlipidemia LDL goal <100    Left upper lobe pulmonary nodule    History of primary tuberculosis    Personal history of nicotine dependence    Panlobular emphysema    Cervical paraspinal muscle spasm    Annual  "physical exam    Obstructive sleep apnea    Hilar adenopathy    Adenocarcinoma, lung, right    Lung cancer    Tobacco use     Advance Care Planning Advance Directive is not on file.  ACP discussion was declined by the patient. Patient does not have an advance directive, declines further assistance.            Objective   Vitals:    25 0930   BP: 137/84   BP Location: Left arm   Patient Position: Sitting   Cuff Size: Adult   Pulse: 74   Temp: 97.7 °F (36.5 °C)   TempSrc: Infrared   SpO2: 99%   Weight: 104 kg (229 lb 3.2 oz)   Height: 190.5 cm (75\")   PainSc: 6    PainLoc: Back       Estimated body mass index is 28.65 kg/m² as calculated from the following:    Height as of this encounter: 190.5 cm (75\").    Weight as of this encounter: 104 kg (229 lb 3.2 oz).                Does the patient have evidence of cognitive impairment? No                                                                                                Health  Risk Assessment    Smoking Status:  Social History     Tobacco Use   Smoking Status Every Day    Current packs/day: 1.00    Average packs/day: 1 pack/day for 55.1 years (55.1 ttl pk-yrs)    Types: Cigarettes    Start date:     Passive exposure: Never   Smokeless Tobacco Never     Alcohol Consumption:  Social History     Substance and Sexual Activity   Alcohol Use Not Currently    Alcohol/week: 42.0 - 56.0 standard drinks of alcohol    Types: 42 - 56 Cans of beer per week    Comment: 6-8 beers per day       Fall Risk Screen  JEAN CLAUDEADI Fall Risk Assessment was completed, and patient is at LOW risk for falls.Assessment completed on:2025    Depression Screening   Little interest or pleasure in doing things? Not at all   Feeling down, depressed, or hopeless? Several days   PHQ-2 Total Score 1      Health Habits and Functional and Cognitive Screenin/24/2025     9:31 AM   Functional & Cognitive Status   Do you have difficulty preparing food and eating? No   Do you have " difficulty bathing yourself, getting dressed or grooming yourself? No   Do you have difficulty using the toilet? No   Do you have difficulty moving around from place to place? No   Do you have trouble with steps or getting out of a bed or a chair? No   Current Diet Unhealthy Diet   Dental Exam Up to date   Eye Exam Up to date   Exercise (times per week) 4 times per week   Current Exercises Include Walking   Do you need help using the phone?  No   Are you deaf or do you have serious difficulty hearing?  No   Do you need help to go to places out of walking distance? No   Do you need help shopping? No   Do you need help preparing meals?  No   Do you need help with housework?  No   Do you need help with laundry? No   Do you need help taking your medications? No   Do you need help managing money? No   Do you ever drive or ride in a car without wearing a seat belt? No   Have you felt unusual stress, anger or loneliness in the last month? Yes   Who do you live with? Spouse   If you need help, do you have trouble finding someone available to you? No   Have you been bothered in the last four weeks by sexual problems? No   Do you have difficulty concentrating, remembering or making decisions? No           Age-appropriate Screening Schedule:  Refer to the list below for future screening recommendations based on patient's age, sex and/or medical conditions. Orders for these recommended tests are listed in the plan section. The patient has been provided with a written plan.    Health Maintenance List  Health Maintenance   Topic Date Due    LIPID PANEL  01/02/2025    HEMOGLOBIN A1C  01/15/2025    COVID-19 Vaccine (1 - 2024-25 season) 02/26/2025 (Originally 9/1/2024)    INFLUENZA VACCINE  03/31/2025 (Originally 7/1/2024)    Pneumococcal Vaccine 50+ (1 of 2 - PCV) 02/24/2026 (Originally 4/12/1975)    AAA SCREEN ONCE  02/24/2026 (Originally 4/12/2021)    ZOSTER VACCINE (1 of 2) 02/24/2026 (Originally 4/12/2006)    BMI FOLLOWUP   07/15/2025    ANNUAL WELLNESS VISIT  02/24/2026    COLORECTAL CANCER SCREENING  05/03/2026    TDAP/TD VACCINES (2 - Td or Tdap) 06/04/2034    HEPATITIS C SCREENING  Completed    DIABETIC EYE EXAM  Discontinued    URINE MICROALBUMIN-CREATININE RATIO (uACR)  Discontinued    LUNG CANCER SCREENING  Discontinued                                                                                                                                                CMS Preventative Services Quick Reference  Risk Factors Identified During Encounter  Immunizations Discussed/Encouraged: Influenza, Pneumococcal 23, Prevnar 20 (Pneumococcal 20-valent conjugate), Shingrix, and COVID19  Polypharmacy: Medication List reviewed and Medications are appropriate for patient  Tobacco Use/Dependance Risk (use dotphrase .tobaccocessation for documentation)    The above risks/problems have been discussed with the patient.  Pertinent information has been shared with the patient in the After Visit Summary.  An After Visit Summary and PPPS were made available to the patient.    Follow Up:   Next Medicare Wellness visit to be scheduled in 1 year.     Patient has been erroneously marked as diabetic. Based on the available clinical information, he does not have diabetes and should therefore be excluded from diabetic health maintenance and quality measures for the remainder of the reporting period.     Additional E&M Note during same encounter follows:  Patient has additional, significant, and separately identifiable condition(s)/problem(s) that require work above and beyond the Medicare Wellness Visit     Chief Complaint  Medicare Wellness-subsequent    Subjective   HPI  Beary is also being seen today for additional medical problem/s.  Patient has complaints of RLQ pain. He states this has been present to some degree since his thoracotomy. He does admit to drinking 6 beers a day. He denies n/v/d/c. Nothing makes the pain worse or better.       "          Objective   Vital Signs:  /84 (BP Location: Left arm, Patient Position: Sitting, Cuff Size: Adult)   Pulse 74   Temp 97.7 °F (36.5 °C) (Infrared)   Ht 190.5 cm (75\")   Wt 104 kg (229 lb 3.2 oz)   SpO2 99%   BMI 28.65 kg/m²   Physical Exam  Vitals and nursing note reviewed.   Constitutional:       General: He is not in acute distress.     Appearance: Normal appearance. He is not ill-appearing.   HENT:      Head: Normocephalic and atraumatic.   Neck:      Vascular: No carotid bruit.   Cardiovascular:      Rate and Rhythm: Normal rate and regular rhythm.      Pulses: Normal pulses.      Heart sounds: Normal heart sounds. No murmur heard.  Pulmonary:      Effort: Pulmonary effort is normal.      Breath sounds: Normal breath sounds.      Comments: Lung sounds diminished throughout lung fields.  Abdominal:      General: Bowel sounds are normal. There is distension.      Palpations: Abdomen is soft. There is no mass.      Tenderness: There is no abdominal tenderness. There is no guarding.      Comments: Soft abdominal distention. Unable to palpate hepatic border.   Musculoskeletal:         General: Normal range of motion.      Cervical back: Neck supple.      Right lower leg: No edema.      Left lower leg: No edema.   Skin:     General: Skin is warm and dry.   Neurological:      Mental Status: He is alert and oriented to person, place, and time.                       Assessment and Plan     Rupa Verma  reports that he has been smoking cigarettes. He started smoking about 55 years ago. He has a 55.1 pack-year smoking history. He has never been exposed to tobacco smoke. He has never used smokeless tobacco. I have educated him on the risk of diseases from using tobacco products such as cancer, COPD, heart disease, and cataracts.     I advised him to quit and he is not willing to quit.    I spent 3  minutes counseling the patient.           Medicare annual wellness visit, subsequent    Orders:    " Comprehensive Metabolic Panel    Lipid Panel With LDL / HDL Ratio    T4    TSH    PSA Screen    CBC & Differential    Hemoglobin A1c    Essential hypertension      Orders:    Comprehensive Metabolic Panel    Lipid Panel With LDL / HDL Ratio    CBC & Differential    Prediabetes    Orders:    Comprehensive Metabolic Panel    Hemoglobin A1c    Hyperlipidemia LDL goal <100       Orders:    Comprehensive Metabolic Panel    Lipid Panel With LDL / HDL Ratio    Fatigue, unspecified type    Orders:    T4    TSH    CBC & Differential    Anemia due to vitamin B12 deficiency, unspecified B12 deficiency type         Screening for prostate cancer    Orders:    PSA Screen    Daily consumption of alcohol    Orders:    Comprehensive Metabolic Panel    RLQ abdominal pain    Orders:    Comprehensive Metabolic Panel    Personal history of nicotine dependence         Overweight with body mass index (BMI) of 28 to 28.9 in adult  Patient's (Body mass index is 28.65 kg/m².) indicates that they are overweight with health conditions that include hypertension, impaired fasting glucose, and dyslipidemias . Weight is unchanged. BMI is above average; BMI management plan is completed. We discussed portion control and increasing exercise.     Patient encouraged to partake of healthy diet rich in fresh fruits and vegetables as well as lean proteins.  Patient encouraged to participate in daily exercise with goal of 30 min sustained activity.  Further recommendations will be based upon lab results.               Follow Up   Return in about 6 months (around 8/24/2025) for Next scheduled follow up.  Patient was given instructions and counseling regarding his condition or for health maintenance advice. Please see specific information pulled into the AVS if appropriate.

## 2025-02-24 NOTE — ASSESSMENT & PLAN NOTE
Orders:    Comprehensive Metabolic Panel    Lipid Panel With LDL / HDL Ratio    CBC & Differential

## 2025-02-25 LAB
ALBUMIN SERPL-MCNC: 4.3 G/DL (ref 3.9–4.9)
ALP SERPL-CCNC: 95 IU/L (ref 44–121)
ALT SERPL-CCNC: 16 IU/L (ref 0–44)
AST SERPL-CCNC: 22 IU/L (ref 0–40)
BASOPHILS # BLD AUTO: 0.1 X10E3/UL (ref 0–0.2)
BASOPHILS NFR BLD AUTO: 1 %
BILIRUB SERPL-MCNC: 0.4 MG/DL (ref 0–1.2)
BUN SERPL-MCNC: 9 MG/DL (ref 8–27)
BUN/CREAT SERPL: 10 (ref 10–24)
CALCIUM SERPL-MCNC: 9.4 MG/DL (ref 8.6–10.2)
CHLORIDE SERPL-SCNC: 98 MMOL/L (ref 96–106)
CHOLEST SERPL-MCNC: 166 MG/DL (ref 100–199)
CO2 SERPL-SCNC: 20 MMOL/L (ref 20–29)
CREAT SERPL-MCNC: 0.87 MG/DL (ref 0.76–1.27)
EGFRCR SERPLBLD CKD-EPI 2021: 94 ML/MIN/1.73
EOSINOPHIL # BLD AUTO: 0.1 X10E3/UL (ref 0–0.4)
EOSINOPHIL NFR BLD AUTO: 1 %
ERYTHROCYTE [DISTWIDTH] IN BLOOD BY AUTOMATED COUNT: 13 % (ref 11.6–15.4)
GLOBULIN SER CALC-MCNC: 2.6 G/DL (ref 1.5–4.5)
GLUCOSE SERPL-MCNC: 176 MG/DL (ref 70–99)
HBA1C MFR BLD: 6.1 % (ref 4.8–5.6)
HCT VFR BLD AUTO: 47.9 % (ref 37.5–51)
HDLC SERPL-MCNC: 46 MG/DL
HGB BLD-MCNC: 15.5 G/DL (ref 13–17.7)
IMM GRANULOCYTES # BLD AUTO: 0.1 X10E3/UL (ref 0–0.1)
IMM GRANULOCYTES NFR BLD AUTO: 1 %
LDLC SERPL CALC-MCNC: 86 MG/DL (ref 0–99)
LDLC/HDLC SERPL: 1.9 RATIO (ref 0–3.6)
LYMPHOCYTES # BLD AUTO: 1.1 X10E3/UL (ref 0.7–3.1)
LYMPHOCYTES NFR BLD AUTO: 11 %
MCH RBC QN AUTO: 32.5 PG (ref 26.6–33)
MCHC RBC AUTO-ENTMCNC: 32.4 G/DL (ref 31.5–35.7)
MCV RBC AUTO: 100 FL (ref 79–97)
MONOCYTES # BLD AUTO: 0.6 X10E3/UL (ref 0.1–0.9)
MONOCYTES NFR BLD AUTO: 6 %
NEUTROPHILS # BLD AUTO: 8.3 X10E3/UL (ref 1.4–7)
NEUTROPHILS NFR BLD AUTO: 80 %
PLATELET # BLD AUTO: 272 X10E3/UL (ref 150–450)
POTASSIUM SERPL-SCNC: 4.3 MMOL/L (ref 3.5–5.2)
PROT SERPL-MCNC: 6.9 G/DL (ref 6–8.5)
PSA SERPL-MCNC: 0.2 NG/ML (ref 0–4)
RBC # BLD AUTO: 4.77 X10E6/UL (ref 4.14–5.8)
SODIUM SERPL-SCNC: 136 MMOL/L (ref 134–144)
T4 SERPL-MCNC: 6.4 UG/DL (ref 4.5–12)
TRIGL SERPL-MCNC: 200 MG/DL (ref 0–149)
TSH SERPL DL<=0.005 MIU/L-ACNC: 1.82 UIU/ML (ref 0.45–4.5)
VLDLC SERPL CALC-MCNC: 34 MG/DL (ref 5–40)
WBC # BLD AUTO: 10.4 X10E3/UL (ref 3.4–10.8)

## 2025-02-26 ENCOUNTER — TELEPHONE (OUTPATIENT)
Dept: FAMILY MEDICINE CLINIC | Facility: CLINIC | Age: 70
End: 2025-02-26
Payer: MEDICARE

## 2025-02-26 NOTE — TELEPHONE ENCOUNTER
Returned call to pt - called pt and left vm with results yesterday. Returned call and had to leave vm with results again today.

## 2025-02-27 ENCOUNTER — HOSPITAL ENCOUNTER (OUTPATIENT)
Dept: PAIN MANAGEMENT | Age: 70
Discharge: HOME | End: 2025-02-27
Payer: MEDICARE

## 2025-02-27 VITALS
TEMPERATURE: 96.9 F | OXYGEN SATURATION: 99 % | SYSTOLIC BLOOD PRESSURE: 134 MMHG | DIASTOLIC BLOOD PRESSURE: 64 MMHG | HEART RATE: 70 BPM | RESPIRATION RATE: 18 BRPM

## 2025-02-27 DIAGNOSIS — M79.18 MYALGIA, OTHER SITE: Primary | ICD-10-CM

## 2025-02-27 DIAGNOSIS — M70.61 TROCHANTERIC BURSITIS OF RIGHT HIP: ICD-10-CM

## 2025-02-27 DIAGNOSIS — M53.3 SI (SACROILIAC) JOINT DYSFUNCTION: ICD-10-CM

## 2025-02-27 PROCEDURE — 20611 DRAIN/INJ JOINT/BURSA W/US: CPT

## 2025-02-27 PROCEDURE — 6360000002 HC RX W HCPCS

## 2025-02-27 RX ORDER — LIDOCAINE HYDROCHLORIDE 10 MG/ML
1 INJECTION, SOLUTION EPIDURAL; INFILTRATION; INTRACAUDAL; PERINEURAL ONCE
Status: DISCONTINUED | OUTPATIENT
Start: 2025-02-27 | End: 2025-03-01 | Stop reason: HOSPADM

## 2025-02-27 RX ORDER — ETHYL CHLORIDE 100 %
AEROSOL, SPRAY (ML) TOPICAL PRN
Status: DISCONTINUED | OUTPATIENT
Start: 2025-02-27 | End: 2025-03-01 | Stop reason: HOSPADM

## 2025-02-27 RX ORDER — BUPIVACAINE HYDROCHLORIDE 5 MG/ML
1 INJECTION, SOLUTION EPIDURAL; INTRACAUDAL ONCE
Status: DISCONTINUED | OUTPATIENT
Start: 2025-02-27 | End: 2025-03-01 | Stop reason: HOSPADM

## 2025-02-27 RX ORDER — TRIAMCINOLONE ACETONIDE 40 MG/ML
80 INJECTION, SUSPENSION INTRA-ARTICULAR; INTRAMUSCULAR ONCE
Status: DISCONTINUED | OUTPATIENT
Start: 2025-02-27 | End: 2025-03-01 | Stop reason: HOSPADM

## 2025-02-27 NOTE — PROCEDURES
PROCEDURE NOTE  Date: 2025   Name: Eufemia Damian  YOB: 1955    Procedures    [facility]    Patient Name: Eufemia Damian    : 1955    Age: 69 y.o.    Sex: male    Date: 2025    Performing Procedure:  Tamara Perkins APRN - CNP    Patient Vitals for the past 24 hrs:   BP Temp Temp src Pulse Resp SpO2   25 1051 134/64 -- -- -- -- 99 %   25 1050 -- 96.9 °F (36.1 °C) Temporal 70 18 --       Pre-op Diagnosis: right  myalgia    Post-op Diagnosis: right  myalgia    Procedure: Ultrasound Guided Injection of  right Sacroiliac Joint(s)     right  Sacroiliac Joint(s)     Description of Procedure:    After voluntary, informed and signed consent obtained the patient was placed in a prone position. Appropriate time out was obtained per policy. The Sacroiliac Joint(s) was palpated for area of maximal tenderness. The area was prepped in an aseptic fashion with CHG swab. The ultrasound transducer was used to confirm the appropriate location. The skin was sprayed with Gebauer's Solution. Under aseptic technique and direct ultrasound visualization a 22 gauge 3 inch spinal needle was introduced into the Sacroiliac Joint(s). Patient was asked if any new pain was radiating down the ipsilateral lower extremity. If patient noted radiating pain the needle was readjusted until radiating pain was gone. After a negative aspiration, a solution of 1 ml of 0.5% Marcaine Plain and 1 ml of 1% Lidocaine Plain    with    [x] 1 ml of Kenalog (40mg/ml)    [] 0.5 ml of DepoMedrol (80mg/ml)    [] 1 ml of DepoMedrol (80mg/ml)     [] 1 ml of Toradol (30mg/ml)     was injected into the Sacroiliac Joint(s). The needle was withdrawn and a sterile dressing applied. If this was a bilateral procedure, the same steps were followed on the opposite side.     Preop Diagnosis: right Trochanteric Bursitis    Postop Diagnosis: right Trochanteric Bursitis    Procedure: Ultrasound

## 2025-02-27 NOTE — DISCHARGE INSTRUCTIONS
[facility] Physical [facility] Pain Medicine  Post Procedure Discharge Instructions        YOU HAVE HAD THE FOLLOWING PROCEDURE:                                  [] Occipital Nerve Blocks  [] CTS wrist injection(s)  [] Knee Injection(s)         [] Shoulder Injection(s)   [] Elbow Injection(s)     [] Botox Injection  [] Cervical Trigger Point Injections    [] Thoracic Trigger Point Injections    [] Lumbar Trigger Point Injections  [] Piriformis Trigger Point Injections  [x] SI Joint Injection(s)     [x] Trochanteric Bursa Injection(s)       [] Ankle Injection(s)   [] Plantar Fasciitis   []  ______________  Injection(s) [] Botox []  Migraines [] Spasticity    YOU HAVE RECEIVED THE FOLLOWING MEDICATIONS IN YOUR INJECTION(s)  [x] Lidocaine [x] Bupivacaine   [] DepoMedrol (steroid) [] Decadron (steroid)  [x]  Kenalog (steroid)   [] Toradol  [] Supartz [] Zilretta    [] Botox        PATIENT INFORMATION:   You may experience the following symptoms after your procedure. These symptoms are normal and should not cause concern:    You may have an increase in your pain. This may last 24 - 48 hours after your procedure.  You may have no change in the pain that you had prior to your injection(s).  You may have weakness or numbness in your affected extremity. If this occurs, this may last until numbing the medication wears off.     REPORT THE FOLLOWING SYMPTOMS TO YOUR DOCTOR:  Redness, swelling or drainage at the injection site(s)  Unusual pain that interferes with your normal activities of daily living.    OTHER INSTRUCTIONS:    [x] I will apply ice to the injection site(s) for at least 24 hours after the procedure. I will rotate the ice on for 20 minutes and off for 20 minutes for at least 24 hours.    [x] I will not apply heat for at least 48 hours and I will not take a hot bath or shower for at least 24 hours.     [x] I understand that if Lidocaine or Bupivacaine was used in my injection(s) that the injection site(s)

## 2025-03-06 ENCOUNTER — TELEPHONE (OUTPATIENT)
Dept: PAIN MANAGEMENT | Age: 70
End: 2025-03-06

## 2025-03-06 NOTE — TELEPHONE ENCOUNTER
Left message regarding post procedure follow up. Electronically signed by Todd Gimenez RN on 3/6/2025 at 2:25 PM

## 2025-04-08 DIAGNOSIS — Z02.89 PAIN MEDICATION AGREEMENT SIGNED: ICD-10-CM

## 2025-04-08 DIAGNOSIS — M54.50 CHRONIC MIDLINE LOW BACK PAIN WITHOUT SCIATICA: ICD-10-CM

## 2025-04-08 DIAGNOSIS — M53.3 SI (SACROILIAC) JOINT DYSFUNCTION: ICD-10-CM

## 2025-04-08 DIAGNOSIS — G89.4 CHRONIC PAIN SYNDROME: ICD-10-CM

## 2025-04-08 DIAGNOSIS — M70.61 TROCHANTERIC BURSITIS OF RIGHT HIP: ICD-10-CM

## 2025-04-08 DIAGNOSIS — G89.29 CHRONIC MIDLINE LOW BACK PAIN WITHOUT SCIATICA: ICD-10-CM

## 2025-04-08 DIAGNOSIS — G62.9 NEUROPATHY: ICD-10-CM

## 2025-04-08 RX ORDER — CELECOXIB 100 MG/1
100 CAPSULE ORAL 2 TIMES DAILY
Qty: 60 CAPSULE | Refills: 2 | Status: SHIPPED | OUTPATIENT
Start: 2025-04-08

## 2025-04-08 RX ORDER — HYDROCODONE BITARTRATE AND ACETAMINOPHEN 7.5; 325 MG/1; MG/1
1 TABLET ORAL 2 TIMES DAILY PRN
Qty: 60 TABLET | Refills: 0 | Status: SHIPPED | OUTPATIENT
Start: 2025-04-09 | End: 2025-05-09

## 2025-04-24 ENCOUNTER — PATIENT OUTREACH (OUTPATIENT)
Dept: CASE MANAGEMENT | Facility: OTHER | Age: 70
End: 2025-04-24
Payer: COMMERCIAL

## 2025-04-24 NOTE — OUTREACH NOTE
AMBULATORY CASE MANAGEMENT NOTE    Names and Relationships of Patient/Support Persons: Contact: Rupa Verma; Relationship: Self -     Patient Outreach    Proactive outreach with Kelli ANDERSON having COPD, direct admits, CHF, and DM. Brief call with patient. He reports he is doing well and denied needs. He saw PCP 2/2024 for Medicare Wellness.         Ivelisse POE  Ambulatory Case Management    4/24/2025, 14:52 CDT

## 2025-04-29 DIAGNOSIS — F32.89 OTHER DEPRESSION: ICD-10-CM

## 2025-04-29 DIAGNOSIS — I10 ESSENTIAL HYPERTENSION: ICD-10-CM

## 2025-04-29 DIAGNOSIS — K21.9 GASTROESOPHAGEAL REFLUX DISEASE, UNSPECIFIED WHETHER ESOPHAGITIS PRESENT: ICD-10-CM

## 2025-04-29 DIAGNOSIS — R73.03 PREDIABETES: ICD-10-CM

## 2025-04-29 DIAGNOSIS — E78.5 HYPERLIPIDEMIA LDL GOAL <100: ICD-10-CM

## 2025-04-29 RX ORDER — SIMVASTATIN 40 MG
40 TABLET ORAL DAILY
Qty: 90 TABLET | Refills: 1 | Status: SHIPPED | OUTPATIENT
Start: 2025-04-29

## 2025-04-29 RX ORDER — CITALOPRAM HYDROBROMIDE 20 MG/1
20 TABLET ORAL DAILY
Qty: 90 TABLET | Refills: 1 | Status: SHIPPED | OUTPATIENT
Start: 2025-04-29

## 2025-04-29 RX ORDER — AMLODIPINE BESYLATE 5 MG/1
5 TABLET ORAL DAILY
Qty: 90 TABLET | Refills: 1 | Status: SHIPPED | OUTPATIENT
Start: 2025-04-29

## 2025-04-29 RX ORDER — METOPROLOL TARTRATE 50 MG
50 TABLET ORAL 2 TIMES DAILY
Qty: 180 TABLET | Refills: 1 | Status: SHIPPED | OUTPATIENT
Start: 2025-04-29

## 2025-04-29 RX ORDER — PANTOPRAZOLE SODIUM 20 MG/1
20 TABLET, DELAYED RELEASE ORAL DAILY
Qty: 90 TABLET | Refills: 1 | Status: SHIPPED | OUTPATIENT
Start: 2025-04-29

## 2025-05-06 DIAGNOSIS — M54.50 CHRONIC MIDLINE LOW BACK PAIN WITHOUT SCIATICA: ICD-10-CM

## 2025-05-06 DIAGNOSIS — Z02.89 PAIN MEDICATION AGREEMENT SIGNED: ICD-10-CM

## 2025-05-06 DIAGNOSIS — G89.4 CHRONIC PAIN SYNDROME: ICD-10-CM

## 2025-05-06 DIAGNOSIS — G89.29 CHRONIC MIDLINE LOW BACK PAIN WITHOUT SCIATICA: ICD-10-CM

## 2025-05-06 DIAGNOSIS — G62.9 NEUROPATHY: ICD-10-CM

## 2025-05-06 RX ORDER — GABAPENTIN 400 MG/1
400 CAPSULE ORAL 3 TIMES DAILY
Qty: 90 CAPSULE | Refills: 0 | Status: SHIPPED | OUTPATIENT
Start: 2025-05-28 | End: 2025-06-27

## 2025-05-06 RX ORDER — HYDROCODONE BITARTRATE AND ACETAMINOPHEN 7.5; 325 MG/1; MG/1
1 TABLET ORAL 2 TIMES DAILY PRN
Qty: 60 TABLET | Refills: 0 | Status: SHIPPED | OUTPATIENT
Start: 2025-05-09 | End: 2025-06-08

## 2025-05-06 NOTE — TELEPHONE ENCOUNTER
Last seen in office visit: 2/12/25  Next scheduled office visit: 5/12/25 with Racquel  Last UDS results: compliant  Any discrepancies on [facility] (such as refills from another provider): none

## 2025-05-12 ENCOUNTER — OFFICE VISIT (OUTPATIENT)
Dept: PAIN MANAGEMENT | Age: 70
End: 2025-05-12
Payer: MEDICAID

## 2025-05-12 VITALS
HEIGHT: 74 IN | SYSTOLIC BLOOD PRESSURE: 159 MMHG | DIASTOLIC BLOOD PRESSURE: 79 MMHG | TEMPERATURE: 97.5 F | BODY MASS INDEX: 29 KG/M2 | RESPIRATION RATE: 16 BRPM | WEIGHT: 226 LBS | HEART RATE: 72 BPM | OXYGEN SATURATION: 97 %

## 2025-05-12 DIAGNOSIS — G89.4 CHRONIC PAIN SYNDROME: ICD-10-CM

## 2025-05-12 DIAGNOSIS — F11.90 CHRONIC, CONTINUOUS USE OF OPIOIDS: ICD-10-CM

## 2025-05-12 DIAGNOSIS — M54.50 CHRONIC MIDLINE LOW BACK PAIN WITHOUT SCIATICA: Primary | ICD-10-CM

## 2025-05-12 DIAGNOSIS — M53.3 SI (SACROILIAC) JOINT DYSFUNCTION: ICD-10-CM

## 2025-05-12 DIAGNOSIS — F17.210 NICOTINE DEPENDENCE, CIGARETTES, UNCOMPLICATED: ICD-10-CM

## 2025-05-12 DIAGNOSIS — M70.61 TROCHANTERIC BURSITIS OF RIGHT HIP: ICD-10-CM

## 2025-05-12 DIAGNOSIS — G62.9 NEUROPATHY: ICD-10-CM

## 2025-05-12 DIAGNOSIS — G89.29 CHRONIC MIDLINE LOW BACK PAIN WITHOUT SCIATICA: Primary | ICD-10-CM

## 2025-05-12 PROCEDURE — 3078F DIAST BP <80 MM HG: CPT

## 2025-05-12 PROCEDURE — G8427 DOCREV CUR MEDS BY ELIG CLIN: HCPCS

## 2025-05-12 PROCEDURE — G8419 CALC BMI OUT NRM PARAM NOF/U: HCPCS

## 2025-05-12 PROCEDURE — 4004F PT TOBACCO SCREEN RCVD TLK: CPT

## 2025-05-12 PROCEDURE — 3077F SYST BP >= 140 MM HG: CPT

## 2025-05-12 PROCEDURE — 99214 OFFICE O/P EST MOD 30 MIN: CPT

## 2025-05-12 PROCEDURE — 1123F ACP DISCUSS/DSCN MKR DOCD: CPT

## 2025-05-12 PROCEDURE — 3017F COLORECTAL CA SCREEN DOC REV: CPT

## 2025-05-12 RX ORDER — HYDROCODONE BITARTRATE AND ACETAMINOPHEN 7.5; 325 MG/1; MG/1
1 TABLET ORAL 2 TIMES DAILY PRN
Qty: 60 TABLET | Refills: 0 | Status: SHIPPED | OUTPATIENT
Start: 2025-06-07 | End: 2025-07-07

## 2025-05-12 ASSESSMENT — ENCOUNTER SYMPTOMS
EYES NEGATIVE: 1
BOWEL INCONTINENCE: 0
GASTROINTESTINAL NEGATIVE: 1
BACK PAIN: 1
RESPIRATORY NEGATIVE: 1

## 2025-05-12 NOTE — ASSESSMENT & PLAN NOTE
Schedule SI Joint Injection for SI joint dysfunction    [x] right with US with Tamara Perkins, NP due to 3 + provacative test and + Eusebio's finger test.

## 2025-05-12 NOTE — ASSESSMENT & PLAN NOTE
- Schedule patient for US guided bursa injection of right greater trochanteric bursa with kenalog with BRIAN Richardson

## 2025-05-12 NOTE — ASSESSMENT & PLAN NOTE
- continue norco  - take twice daily with food, take lowest effective dose, and take with food to avoid stomach upset  - increase water intake to prevent constipation  - report any side effects to the office  - continue celecoxib (Celebrex)  - take with food to avoid stomach upset  - report any abdominal pain, bloody stools or bloody vomit immediately  - increase water intake while taking  - continue gabapentin

## 2025-05-12 NOTE — ASSESSMENT & PLAN NOTE
- TERESA reviewed, drug screens reviewed, visits appropriate. Pain contract up to date.     Will continue to strive to explore alternatives to chronic opioid use in this patient with non-malignant pain. Counseling provided in regards to the risks of opioid medications including risk of tolerance, withdrawals and addiction. Furthermore, these medications are known to cause immunosuppression, respiratory depression and even early death.     The CDC guideline is aimed at improving opioid prescribing practices to ensure safe and effective chronic pain treatment while reducing the risk of opioid use disorder (OUD), overdose, and death. Although the CDC advises against high doses of opioid pain medication therapy, it may be medically risky to abruptly discontinue opioids and other controlled substances in a patient who is physically dependent without psychosocial management, focused care, gradual taper, and/or adjuvant therapies to treat withdrawal symptoms. Patients must also be willing to taper and pursue alternative medications and treatments.     This patient is high risk on opioid medications. Patient has been on for some time, does not wish to wean and is aware of the risks. Due to risk of suicidal ideation and deterioration of condition if forced to taper, will continue medication at current dose and continue to offer alternative methods of pain control.

## 2025-05-12 NOTE — PROGRESS NOTES
of combining Benzodiazapines and Opioids. Explained \"Black Box Warnings\" such as; possible suppressed breathing, hypoxia, anoxia, depressed cognition, heart arrhythmia, coma and possible death. Patient verbalized understanding concerning possible effects.    [x] Tobacco Cessation:  Patient educated on the harms of tobacco/nicotine use and was provided with resources on available programs to assist in smoking cessation. Patient does show understanding.  Patient does not have the desire to quit smoking in the near future.    Quit Now [place] is a FREE online service available to [place] residents 15 years of age and over. When you become a member,it offers a free telephone service, so you can speak to a  in person, if you would prefer. Call the QuitLine at 3-219-QUITNOW or 1-218.392.7831.  special tools, a support team of coaches, research-based information, and a community of others trying to become tobacco free. Our expert coaches can talk to you about overcoming common barriers, such as dealing with stress, fighting cravings, coping with irritability, and controlling weight gain.  Http://www.cdc.gov/tobacco/campaign/tips/index.htm  Https://www.quitnow[place].org/  CC:  Nneka Lu APRN - CNP Holly Kimberlin, APRN - CNP, 5/12/2025 at 4:12 PM    EMR dragon/transcription disclaimer: Much of this encounter note is electronic transcription/translation of spoken language to printed tach. Electronic translation of spoken language may be erroneous, or at times, nonsensical words or phrases may be inadvertently transcribed. Although, I have reviewed the note for such errors, some may still exist.

## 2025-05-19 ENCOUNTER — TELEPHONE (OUTPATIENT)
Dept: FAMILY MEDICINE CLINIC | Facility: CLINIC | Age: 70
End: 2025-05-19
Payer: COMMERCIAL

## 2025-05-19 NOTE — TELEPHONE ENCOUNTER
Left pt. Voicemail to return my call and let us know if he has had his yearly eye exam or not so we can get those records and update in his EMR.

## 2025-05-29 ENCOUNTER — HOSPITAL ENCOUNTER (OUTPATIENT)
Dept: PAIN MANAGEMENT | Age: 70
Discharge: HOME | End: 2025-05-29
Payer: MEDICAID

## 2025-05-29 VITALS
TEMPERATURE: 97.2 F | RESPIRATION RATE: 16 BRPM | HEART RATE: 72 BPM | SYSTOLIC BLOOD PRESSURE: 147 MMHG | OXYGEN SATURATION: 96 % | DIASTOLIC BLOOD PRESSURE: 61 MMHG

## 2025-05-29 DIAGNOSIS — M79.18 MYALGIA, OTHER SITE: ICD-10-CM

## 2025-05-29 DIAGNOSIS — M70.61 TROCHANTERIC BURSITIS OF RIGHT HIP: Primary | ICD-10-CM

## 2025-05-29 DIAGNOSIS — M53.3 SI (SACROILIAC) JOINT DYSFUNCTION: ICD-10-CM

## 2025-05-29 PROCEDURE — 20611 DRAIN/INJ JOINT/BURSA W/US: CPT

## 2025-05-29 PROCEDURE — 6360000002 HC RX W HCPCS

## 2025-05-29 RX ORDER — BUPIVACAINE HYDROCHLORIDE 5 MG/ML
1 INJECTION, SOLUTION EPIDURAL; INTRACAUDAL; PERINEURAL ONCE
Status: DISCONTINUED | OUTPATIENT
Start: 2025-05-29 | End: 2025-05-31 | Stop reason: HOSPADM

## 2025-05-29 RX ORDER — TRIAMCINOLONE ACETONIDE 40 MG/ML
80 INJECTION, SUSPENSION INTRA-ARTICULAR; INTRAMUSCULAR ONCE
Status: DISCONTINUED | OUTPATIENT
Start: 2025-05-29 | End: 2025-05-31 | Stop reason: HOSPADM

## 2025-05-29 RX ORDER — LIDOCAINE HYDROCHLORIDE 10 MG/ML
1 INJECTION, SOLUTION EPIDURAL; INFILTRATION; INTRACAUDAL; PERINEURAL ONCE
Status: DISCONTINUED | OUTPATIENT
Start: 2025-05-29 | End: 2025-05-31 | Stop reason: HOSPADM

## 2025-05-29 RX ORDER — ETHYL CHLORIDE 100 %
AEROSOL, SPRAY (ML) TOPICAL PRN
Status: DISCONTINUED | OUTPATIENT
Start: 2025-05-29 | End: 2025-05-31 | Stop reason: HOSPADM

## 2025-05-29 NOTE — DISCHARGE INSTRUCTIONS
[facility]  Post Procedure Discharge Instructions        YOU HAVE HAD THE FOLLOWING PROCEDURE:                                  [] Occipital Nerve Blocks  [] CTS wrist injection(s)  [] Knee Injection(s)         [] Shoulder Injection(s)   [] Elbow Injection(s)     [] Botox Injection  [] Cervical Trigger Point Injections    [] Thoracic Trigger Point Injections    [] Lumbar Trigger Point Injections  [] Piriformis Trigger Point Injections  [x] SI Joint Injection(s)     [x] Trochanteric Bursa Injection(s)       [] Ankle Injection(s)   [] Plantar Fasciitis   []  ______________  Injection(s) [] Botox []  Migraines [] Spasticity    YOU HAVE RECEIVED THE FOLLOWING MEDICATIONS IN YOUR INJECTION(s)  [x] Lidocaine [x] Bupivacaine   [] DepoMedrol (steroid) [] Decadron (steroid)  [x]  Kenalog (steroid)   [] Toradol  [] Supartz [] Zilretta    [] Botox        PATIENT INFORMATION:   You may experience the following symptoms after your procedure. These symptoms are normal and should not cause concern:    You may have an increase in your pain. This may last 24 - 48 hours after your procedure.  You may have no change in the pain that you had prior to your injection(s).  You may have weakness or numbness in your affected extremity. If this occurs, this may last until numbing the medication wears off.     REPORT THE FOLLOWING SYMPTOMS TO YOUR DOCTOR:  Redness, swelling or drainage at the injection site(s)  Unusual pain that interferes with your normal activities of daily living.    OTHER INSTRUCTIONS:    [x] I will apply ice to the injection site(s) for at least 24 hours after the procedure. I will rotate the ice on for 20 minutes and off for 20 minutes for at least 24 hours.    [x] I will not apply heat for at least 48 hours and I will not take a hot bath or shower for at least 24 hours.     [x] I understand that if Lidocaine or Bupivacaine was used in my injection(s) that the injection site(s)

## 2025-05-29 NOTE — PROCEDURES
PROCEDURE NOTE  Date: 2025   Name: Eufemia Damian  YOB: 1955    Procedures    [facility]    Patient Name: Eufemia Damian    : 1955    Age: 70 y.o.    Sex: male    Date: May 29, 2025    Performing Procedure:  Tamara Perkins APRN - CNP    Patient Vitals for the past 24 hrs:   BP Temp Temp src Pulse Resp SpO2   25 1346 (!) 147/61 97.2 °F (36.2 °C) Temporal 72 16 96 %       Pre-op Diagnosis: right  myalgia    Post-op Diagnosis: right  myalgia    Procedure: Ultrasound Guided Injection of  right Sacroiliac Joint(s)     right  Sacroiliac Joint(s)     Description of Procedure:    After voluntary, informed and signed consent obtained the patient was placed in a prone position. Appropriate time out was obtained per policy. The Sacroiliac Joint(s) was palpated for area of maximal tenderness. The area was prepped in an aseptic fashion with CHG swab. The ultrasound transducer was used to confirm the appropriate location. The skin was sprayed with Gebauer's Solution. Under aseptic technique and direct ultrasound visualization a 22 gauge 3 inch spinal needle was introduced into the Sacroiliac Joint(s). Patient was asked if any new pain was radiating down the ipsilateral lower extremity. If patient noted radiating pain the needle was readjusted until radiating pain was gone. After a negative aspiration, a solution of 1 ml of 0.5% Marcaine Plain and 1 ml of 1% Lidocaine Plain    with      [x] 1 ml of Kenalog (40mg/ml)    [] 0.5 ml of DepoMedrol (80mg/ml)    [] 1 ml of DepoMedrol (80mg/ml)     [] 1 ml of Toradol (30mg/ml)     was injected into the Sacroiliac Joint(s). The needle was withdrawn and a sterile dressing applied. If this was a bilateral procedure, the same steps were followed on the opposite side.     Preop Diagnosis: right Trochanteric Bursitis    Postop Diagnosis: right Trochanteric Bursitis    Procedure: Ultrasound Guided Injection of right

## 2025-06-30 DIAGNOSIS — G62.9 NEUROPATHY: ICD-10-CM

## 2025-06-30 DIAGNOSIS — G89.4 CHRONIC PAIN SYNDROME: ICD-10-CM

## 2025-06-30 DIAGNOSIS — G89.29 CHRONIC MIDLINE LOW BACK PAIN WITHOUT SCIATICA: ICD-10-CM

## 2025-06-30 DIAGNOSIS — M54.50 CHRONIC MIDLINE LOW BACK PAIN WITHOUT SCIATICA: ICD-10-CM

## 2025-06-30 NOTE — TELEPHONE ENCOUNTER
Last seen in office visit: 5/12/25  Next scheduled office visit: 9/15/25 with Racquel  Last UDS results: compliant  Any discrepancies on [facility] (such as refills from another provider): none    Racquel out of the office.

## 2025-07-01 RX ORDER — GABAPENTIN 400 MG/1
400 CAPSULE ORAL 3 TIMES DAILY
Qty: 90 CAPSULE | Refills: 0 | Status: SHIPPED | OUTPATIENT
Start: 2025-07-01 | End: 2025-07-31

## 2025-07-07 DIAGNOSIS — M70.61 TROCHANTERIC BURSITIS OF RIGHT HIP: ICD-10-CM

## 2025-07-07 DIAGNOSIS — M54.50 CHRONIC MIDLINE LOW BACK PAIN WITHOUT SCIATICA: ICD-10-CM

## 2025-07-07 DIAGNOSIS — G62.9 NEUROPATHY: ICD-10-CM

## 2025-07-07 DIAGNOSIS — M53.3 SI (SACROILIAC) JOINT DYSFUNCTION: ICD-10-CM

## 2025-07-07 DIAGNOSIS — G89.4 CHRONIC PAIN SYNDROME: ICD-10-CM

## 2025-07-07 DIAGNOSIS — G89.29 CHRONIC MIDLINE LOW BACK PAIN WITHOUT SCIATICA: ICD-10-CM

## 2025-07-08 RX ORDER — GABAPENTIN 400 MG/1
400 CAPSULE ORAL 3 TIMES DAILY
Qty: 90 CAPSULE | Refills: 0 | Status: SHIPPED | OUTPATIENT
Start: 2025-07-31 | End: 2025-08-30

## 2025-07-08 RX ORDER — HYDROCODONE BITARTRATE AND ACETAMINOPHEN 7.5; 325 MG/1; MG/1
1 TABLET ORAL 2 TIMES DAILY PRN
Qty: 60 TABLET | Refills: 0 | Status: SHIPPED | OUTPATIENT
Start: 2025-07-09 | End: 2025-08-08

## 2025-07-08 RX ORDER — CELECOXIB 100 MG/1
100 CAPSULE ORAL 2 TIMES DAILY
Qty: 60 CAPSULE | Refills: 2 | Status: SHIPPED | OUTPATIENT
Start: 2025-07-08

## 2025-07-31 DIAGNOSIS — K21.9 GASTROESOPHAGEAL REFLUX DISEASE, UNSPECIFIED WHETHER ESOPHAGITIS PRESENT: ICD-10-CM

## 2025-07-31 RX ORDER — PANTOPRAZOLE SODIUM 20 MG/1
20 TABLET, DELAYED RELEASE ORAL DAILY
Qty: 90 TABLET | Refills: 1 | Status: SHIPPED | OUTPATIENT
Start: 2025-07-31

## 2025-07-31 NOTE — TELEPHONE ENCOUNTER
Rx Refill Note  Requested Prescriptions     Pending Prescriptions Disp Refills    pantoprazole (PROTONIX) 20 MG EC tablet [Pharmacy Med Name: PANTOPRAZOLE SODIUM 20MG TBEC] 90 tablet 1     Sig: TAKE ONE TABLET BY MOUTH EVERY DAY      Last office visit with prescribing clinician: 2/24/2025   Next office visit with prescribing clinician: Visit date not found       Lacey Sneed MA  07/31/25, 10:15 CDT

## 2025-08-06 DIAGNOSIS — G62.9 NEUROPATHY: ICD-10-CM

## 2025-08-06 DIAGNOSIS — G89.4 CHRONIC PAIN SYNDROME: ICD-10-CM

## 2025-08-06 DIAGNOSIS — M54.50 CHRONIC MIDLINE LOW BACK PAIN WITHOUT SCIATICA: ICD-10-CM

## 2025-08-06 DIAGNOSIS — G89.29 CHRONIC MIDLINE LOW BACK PAIN WITHOUT SCIATICA: ICD-10-CM

## 2025-08-06 RX ORDER — GABAPENTIN 400 MG/1
400 CAPSULE ORAL 3 TIMES DAILY
Qty: 90 CAPSULE | Refills: 0 | Status: SHIPPED | OUTPATIENT
Start: 2025-08-30 | End: 2025-09-29

## 2025-08-06 RX ORDER — HYDROCODONE BITARTRATE AND ACETAMINOPHEN 7.5; 325 MG/1; MG/1
1 TABLET ORAL 2 TIMES DAILY PRN
Qty: 60 TABLET | Refills: 0 | Status: SHIPPED | OUTPATIENT
Start: 2025-08-08 | End: 2025-09-07

## 2025-09-04 DIAGNOSIS — G89.29 CHRONIC MIDLINE LOW BACK PAIN WITHOUT SCIATICA: ICD-10-CM

## 2025-09-04 DIAGNOSIS — G62.9 NEUROPATHY: ICD-10-CM

## 2025-09-04 DIAGNOSIS — M53.3 SI (SACROILIAC) JOINT DYSFUNCTION: ICD-10-CM

## 2025-09-04 DIAGNOSIS — M70.61 TROCHANTERIC BURSITIS OF RIGHT HIP: ICD-10-CM

## 2025-09-04 DIAGNOSIS — M54.50 CHRONIC MIDLINE LOW BACK PAIN WITHOUT SCIATICA: ICD-10-CM

## 2025-09-04 DIAGNOSIS — G89.4 CHRONIC PAIN SYNDROME: ICD-10-CM

## 2025-09-04 RX ORDER — CELECOXIB 100 MG/1
100 CAPSULE ORAL 2 TIMES DAILY
Qty: 60 CAPSULE | Refills: 2 | Status: SHIPPED | OUTPATIENT
Start: 2025-10-06 | End: 2026-01-04

## 2025-09-04 RX ORDER — HYDROCODONE BITARTRATE AND ACETAMINOPHEN 7.5; 325 MG/1; MG/1
1 TABLET ORAL 2 TIMES DAILY PRN
Qty: 60 TABLET | Refills: 0 | Status: SHIPPED | OUTPATIENT
Start: 2025-10-08 | End: 2025-11-07

## 2025-09-04 RX ORDER — HYDROCODONE BITARTRATE AND ACETAMINOPHEN 7.5; 325 MG/1; MG/1
1 TABLET ORAL 2 TIMES DAILY PRN
Qty: 60 TABLET | Refills: 0 | Status: SHIPPED | OUTPATIENT
Start: 2025-09-08 | End: 2025-10-08

## 2025-09-04 RX ORDER — GABAPENTIN 400 MG/1
400 CAPSULE ORAL 3 TIMES DAILY
Qty: 90 CAPSULE | Refills: 2 | Status: SHIPPED | OUTPATIENT
Start: 2025-09-29 | End: 2025-12-28

## (undated) DEVICE — GLV SURG DERMASSURE GRN LF PF 8.0

## (undated) DEVICE — CATH IV JELCO 18GA 10 1 3/4 IN

## (undated) DEVICE — REDUCER: Brand: ENDOWRIST

## (undated) DEVICE — ANTIBACTERIAL UNDYED BRAIDED (POLYGLACTIN 910), SYNTHETIC ABSORBABLE SUTURE: Brand: COATED VICRYL

## (undated) DEVICE — CANNULA SEAL

## (undated) DEVICE — SUT PROLN 5/0 RB1 D/A 36IN 8556H

## (undated) DEVICE — ARM DRAPE

## (undated) DEVICE — TROC BLADLES ANCHORPORT/OPTI LP 12X120MM 1P/U

## (undated) DEVICE — ADHS SKIN PREMIERPRO EXOFIN TOPICAL HI/VISC .5ML

## (undated) DEVICE — DRSNG TELFA PAD NONADH STR 1S 3X8IN

## (undated) DEVICE — NEEDLE, QUINCKE 22GX3.5": Brand: MEDLINE INDUSTRIES, INC.

## (undated) DEVICE — DRSNG SURESITE WNDW 2.38X2.75

## (undated) DEVICE — DRAPE,UTILITY,TAPE,15X26,STERILE: Brand: MEDLINE

## (undated) DEVICE — LP VESL MAXI 2.5X1MM RED 2PK

## (undated) DEVICE — 24 FR STRAIGHT – SOFT PVC CATHETER: Brand: PVC THORACIC CATHETERS

## (undated) DEVICE — 5-IN-1 BUBBLE CONNECTOR,VINYL: Brand: ARGYLE

## (undated) DEVICE — SPNG GZ 2S 2X2 8PLY STRL PK/2

## (undated) DEVICE — 32 FR RIGHT ANGLE – SOFT PVC CATHETER: Brand: PVC THORACIC CATHETERS

## (undated) DEVICE — BLADELESS OBTURATOR: Brand: WECK VISTA

## (undated) DEVICE — TOTAL TRAY, 16FR 10ML SIL FOLEY, URN: Brand: MEDLINE

## (undated) DEVICE — SPNG GZ STRL 2S 4X4 12PLY

## (undated) DEVICE — TP APPL SPRY EXT PROGEL 11IN

## (undated) DEVICE — OASIS DRAIN, SINGLE, INLINE & ATS COMPATIBLE: Brand: OASIS

## (undated) DEVICE — ENDOPOUCH RETRIEVER SPECIMEN RETRIEVAL BAGS: Brand: ENDOPOUCH RETRIEVER

## (undated) DEVICE — ST TBG AIRSEAL FLTR TRI LUM

## (undated) DEVICE — SUT MNCRYL 4/0 PS2 27IN UD MCP426H

## (undated) DEVICE — CONTAINER,SPECIMEN,OR STERILE,4OZ: Brand: MEDLINE

## (undated) DEVICE — TOWEL,OR,DSP,ST,BLUE,DLX,10/PK,8PK/CS: Brand: MEDLINE

## (undated) DEVICE — BAPTIST TURNOVER KIT: Brand: MEDLINE INDUSTRIES, INC.

## (undated) DEVICE — ELECTRD BLD EZ CLN MOD XLNG 2.75IN

## (undated) DEVICE — DAVINCI: Brand: MEDLINE INDUSTRIES, INC.

## (undated) DEVICE — KT CLN CLEANOR SCPE

## (undated) DEVICE — SEAL

## (undated) DEVICE — APPL CHLORAPREP HI/LITE 26ML ORNG

## (undated) DEVICE — GLV SURG BIOGEL M LTX PF 7 1/2

## (undated) DEVICE — ADHS LIQ MASTISOL 2/3ML

## (undated) DEVICE — CLTH CLENS READYCLEANSE PERI CARE PK/5

## (undated) DEVICE — SUT SILK 2/0 FS BLK 18IN 685G

## (undated) DEVICE — STRIP,CLOSURE,WOUND,MEDI-STRIP,1/2X4: Brand: MEDLINE

## (undated) DEVICE — SPNG LAP CIGARETTE KITTNER 5MM STRL PK/5